# Patient Record
Sex: FEMALE | Race: WHITE | NOT HISPANIC OR LATINO
[De-identification: names, ages, dates, MRNs, and addresses within clinical notes are randomized per-mention and may not be internally consistent; named-entity substitution may affect disease eponyms.]

---

## 2017-02-07 ENCOUNTER — APPOINTMENT (OUTPATIENT)
Dept: INTERNAL MEDICINE | Facility: CLINIC | Age: 62
End: 2017-02-07

## 2017-04-08 ENCOUNTER — RX RENEWAL (OUTPATIENT)
Age: 62
End: 2017-04-08

## 2017-05-03 ENCOUNTER — OUTPATIENT (OUTPATIENT)
Dept: OUTPATIENT SERVICES | Facility: HOSPITAL | Age: 62
LOS: 1 days | Discharge: HOME | End: 2017-05-03
Payer: COMMERCIAL

## 2017-05-03 ENCOUNTER — TRANSCRIPTION ENCOUNTER (OUTPATIENT)
Age: 62
End: 2017-05-03

## 2017-05-03 DIAGNOSIS — M46.1 SACROILIITIS, NOT ELSEWHERE CLASSIFIED: ICD-10-CM

## 2017-05-03 PROCEDURE — 76000 FLUOROSCOPY <1 HR PHYS/QHP: CPT

## 2017-05-03 PROCEDURE — G0260: CPT | Mod: LT

## 2017-05-15 ENCOUNTER — APPOINTMENT (OUTPATIENT)
Dept: RHEUMATOLOGY | Facility: CLINIC | Age: 62
End: 2017-05-15

## 2017-05-15 VITALS — HEART RATE: 76 BPM | SYSTOLIC BLOOD PRESSURE: 120 MMHG | RESPIRATION RATE: 16 BRPM | DIASTOLIC BLOOD PRESSURE: 70 MMHG

## 2017-05-16 ENCOUNTER — APPOINTMENT (OUTPATIENT)
Dept: RHEUMATOLOGY | Facility: CLINIC | Age: 62
End: 2017-05-16

## 2017-05-16 ENCOUNTER — LABORATORY RESULT (OUTPATIENT)
Age: 62
End: 2017-05-16

## 2017-05-16 ENCOUNTER — RESULT CHARGE (OUTPATIENT)
Age: 62
End: 2017-05-16

## 2017-05-17 LAB
BASOPHILS # BLD AUTO: 0.06 K/UL
BASOPHILS NFR BLD AUTO: 0.6 %
EOSINOPHIL # BLD AUTO: 0.48 K/UL
EOSINOPHIL NFR BLD AUTO: 4.8 %
HCT VFR BLD CALC: 43.1 %
HGB BLD-MCNC: 14 G/DL
IMM GRANULOCYTES NFR BLD AUTO: 0.5 %
LYMPHOCYTES # BLD AUTO: 2.46 K/UL
LYMPHOCYTES NFR BLD AUTO: 24.7 %
MAN DIFF?: NORMAL
MCHC RBC-ENTMCNC: 28.7 PG
MCHC RBC-ENTMCNC: 32.5 GM/DL
MCV RBC AUTO: 88.3 FL
MONOCYTES # BLD AUTO: 0.65 K/UL
MONOCYTES NFR BLD AUTO: 6.5 %
NEUTROPHILS # BLD AUTO: 6.25 K/UL
NEUTROPHILS NFR BLD AUTO: 62.9 %
PLATELET # BLD AUTO: 333 K/UL
RBC # BLD: 4.88 M/UL
RBC # FLD: 15.1 %
WBC # FLD AUTO: 9.95 K/UL

## 2017-05-21 LAB
ALBUMIN SERPL ELPH-MCNC: 4.1 G/DL
ALP BLD-CCNC: 87 U/L
ALT SERPL-CCNC: 27 U/L
ANION GAP SERPL CALC-SCNC: 17 MMOL/L
AST SERPL-CCNC: 22 U/L
BILIRUB SERPL-MCNC: 0.3 MG/DL
BUN SERPL-MCNC: 19 MG/DL
CALCIUM SERPL-MCNC: 9.7 MG/DL
CHLORIDE SERPL-SCNC: 101 MMOL/L
CK SERPL-CCNC: 71 U/L
CO2 SERPL-SCNC: 23 MMOL/L
CREAT SERPL-MCNC: 0.76 MG/DL
CRP SERPL-MCNC: 0.4 MG/DL
DEPRECATED KAPPA LC FREE/LAMBDA SER: 0.92 RATIO
ENA SS-A AB SER IA-ACNC: <0.2 AL
ENA SS-B AB SER IA-ACNC: <0.2 AL
GLUCOSE SERPL-MCNC: 106 MG/DL
IGA SER QL IEP: 172 MG/DL
IGG SER QL IEP: 1680 MG/DL
IGM SER QL IEP: 61 MG/DL
KAPPA LC CSF-MCNC: 2.13 MG/DL
KAPPA LC SERPL-MCNC: 1.95 MG/DL
M PROTEIN SPEC IFE-MCNC: NORMAL
PHOSPHATE SERPL-MCNC: 3.3 MG/DL
POTASSIUM SERPL-SCNC: 4 MMOL/L
PROT SERPL-MCNC: 7.7 G/DL
SODIUM SERPL-SCNC: 141 MMOL/L
T3 SERPL-MCNC: 119 NG/DL
T3RU NFR SERPL: 1.03 INDEX
T4 SERPL-MCNC: 6.8 UG/DL
TSH SERPL-ACNC: 0.72 UIU/ML

## 2017-05-25 ENCOUNTER — TRANSCRIPTION ENCOUNTER (OUTPATIENT)
Age: 62
End: 2017-05-25

## 2017-06-16 ENCOUNTER — OUTPATIENT (OUTPATIENT)
Dept: OUTPATIENT SERVICES | Facility: HOSPITAL | Age: 62
LOS: 1 days | End: 2017-06-16
Payer: COMMERCIAL

## 2017-06-16 ENCOUNTER — TRANSCRIPTION ENCOUNTER (OUTPATIENT)
Age: 62
End: 2017-06-16

## 2017-06-16 DIAGNOSIS — M54.16 RADICULOPATHY, LUMBAR REGION: ICD-10-CM

## 2017-06-16 PROCEDURE — G0260: CPT | Mod: LT

## 2017-06-16 PROCEDURE — 77002 NEEDLE LOCALIZATION BY XRAY: CPT

## 2017-09-05 ENCOUNTER — APPOINTMENT (OUTPATIENT)
Dept: RHEUMATOLOGY | Facility: CLINIC | Age: 62
End: 2017-09-05
Payer: COMMERCIAL

## 2017-09-05 VITALS
HEIGHT: 66 IN | RESPIRATION RATE: 18 BRPM | OXYGEN SATURATION: 97 % | BODY MASS INDEX: 28.93 KG/M2 | DIASTOLIC BLOOD PRESSURE: 80 MMHG | SYSTOLIC BLOOD PRESSURE: 130 MMHG | WEIGHT: 180 LBS | HEART RATE: 79 BPM | TEMPERATURE: 97.6 F

## 2017-09-05 DIAGNOSIS — M20.009 UNSPECIFIED DEFORMITY OF UNSPECIFIED FINGER(S): ICD-10-CM

## 2017-09-05 PROCEDURE — 36415 COLL VENOUS BLD VENIPUNCTURE: CPT

## 2017-09-05 PROCEDURE — 99215 OFFICE O/P EST HI 40 MIN: CPT | Mod: 25

## 2017-09-05 PROCEDURE — 81003 URINALYSIS AUTO W/O SCOPE: CPT | Mod: QW

## 2017-09-05 PROCEDURE — 85651 RBC SED RATE NONAUTOMATED: CPT

## 2017-09-06 LAB
BILIRUB UR QL STRIP: NORMAL
CLARITY UR: CLEAR
COLLECTION METHOD: NORMAL
GLUCOSE UR-MCNC: NORMAL
HCG UR QL: 0.2 EU/DL
HGB UR QL STRIP.AUTO: NORMAL
KETONES UR-MCNC: NORMAL
LEUKOCYTE ESTERASE UR QL STRIP: NORMAL
NITRITE UR QL STRIP: POSITIVE
PH UR STRIP: 6
PROT UR STRIP-MCNC: NORMAL
SP GR UR STRIP: 1.01
WESR: 21

## 2017-09-09 LAB
ALBUMIN SERPL ELPH-MCNC: 4.3 G/DL
ALP BLD-CCNC: 100 U/L
ALT SERPL-CCNC: 23 U/L
ANION GAP SERPL CALC-SCNC: 17 MMOL/L
AST SERPL-CCNC: 20 U/L
BASOPHILS # BLD AUTO: 0.04 K/UL
BASOPHILS NFR BLD AUTO: 0.3 %
BILIRUB SERPL-MCNC: 0.2 MG/DL
BUN SERPL-MCNC: 15 MG/DL
CALCIUM SERPL-MCNC: 9.4 MG/DL
CHLORIDE SERPL-SCNC: 101 MMOL/L
CK SERPL-CCNC: 83 U/L
CO2 SERPL-SCNC: 23 MMOL/L
CREAT SERPL-MCNC: 0.72 MG/DL
CRP SERPL-MCNC: 0.5 MG/DL
EOSINOPHIL # BLD AUTO: 0.4 K/UL
EOSINOPHIL NFR BLD AUTO: 3.3 %
GLUCOSE SERPL-MCNC: 96 MG/DL
HCT VFR BLD CALC: 40.9 %
HGB BLD-MCNC: 13.3 G/DL
IMM GRANULOCYTES NFR BLD AUTO: 0.5 %
LYMPHOCYTES # BLD AUTO: 2.95 K/UL
LYMPHOCYTES NFR BLD AUTO: 24.5 %
MAN DIFF?: NORMAL
MCHC RBC-ENTMCNC: 28.1 PG
MCHC RBC-ENTMCNC: 32.5 GM/DL
MCV RBC AUTO: 86.5 FL
MONOCYTES # BLD AUTO: 1.15 K/UL
MONOCYTES NFR BLD AUTO: 9.6 %
NEUTROPHILS # BLD AUTO: 7.43 K/UL
NEUTROPHILS NFR BLD AUTO: 61.8 %
PHOSPHATE SERPL-MCNC: 3.1 MG/DL
PLATELET # BLD AUTO: 371 K/UL
POTASSIUM SERPL-SCNC: 4 MMOL/L
PROT SERPL-MCNC: 7.4 G/DL
RBC # BLD: 4.73 M/UL
RBC # FLD: 14.3 %
SODIUM SERPL-SCNC: 141 MMOL/L
WBC # FLD AUTO: 12.03 K/UL

## 2017-11-06 ENCOUNTER — TRANSCRIPTION ENCOUNTER (OUTPATIENT)
Age: 62
End: 2017-11-06

## 2017-12-01 ENCOUNTER — RX RENEWAL (OUTPATIENT)
Age: 62
End: 2017-12-01

## 2017-12-01 ENCOUNTER — MEDICATION RENEWAL (OUTPATIENT)
Age: 62
End: 2017-12-01

## 2017-12-02 ENCOUNTER — RX RENEWAL (OUTPATIENT)
Age: 62
End: 2017-12-02

## 2017-12-12 ENCOUNTER — TRANSCRIPTION ENCOUNTER (OUTPATIENT)
Age: 62
End: 2017-12-12

## 2018-01-08 ENCOUNTER — APPOINTMENT (OUTPATIENT)
Dept: RHEUMATOLOGY | Facility: CLINIC | Age: 63
End: 2018-01-08
Payer: COMMERCIAL

## 2018-01-08 VITALS
HEART RATE: 85 BPM | RESPIRATION RATE: 18 BRPM | TEMPERATURE: 97.9 F | OXYGEN SATURATION: 97 % | SYSTOLIC BLOOD PRESSURE: 132 MMHG | BODY MASS INDEX: 29.41 KG/M2 | HEIGHT: 66 IN | WEIGHT: 183 LBS | DIASTOLIC BLOOD PRESSURE: 72 MMHG

## 2018-01-08 LAB
BILIRUB UR QL STRIP: NORMAL
CLARITY UR: CLEAR
COLLECTION METHOD: NORMAL
GLUCOSE UR-MCNC: NORMAL
HCG UR QL: 0.2 EU/DL
HGB UR QL STRIP.AUTO: NORMAL
KETONES UR-MCNC: NORMAL
LEUKOCYTE ESTERASE UR QL STRIP: NORMAL
NITRITE UR QL STRIP: POSITIVE
PH UR STRIP: 6
PROT UR STRIP-MCNC: NORMAL
SP GR UR STRIP: 1.02
WESR: 17

## 2018-01-08 PROCEDURE — 99214 OFFICE O/P EST MOD 30 MIN: CPT | Mod: 25

## 2018-01-08 PROCEDURE — 36415 COLL VENOUS BLD VENIPUNCTURE: CPT

## 2018-01-08 PROCEDURE — 85651 RBC SED RATE NONAUTOMATED: CPT

## 2018-01-10 LAB
25(OH)D3 SERPL-MCNC: 48.9 NG/ML
ALBUMIN SERPL ELPH-MCNC: 4.6 G/DL
ALP BLD-CCNC: 111 U/L
ALT SERPL-CCNC: 28 U/L
ANION GAP SERPL CALC-SCNC: 17 MMOL/L
APPEARANCE: CLEAR
AST SERPL-CCNC: 21 U/L
BACTERIA: ABNORMAL
BASOPHILS # BLD AUTO: 0.05 K/UL
BASOPHILS NFR BLD AUTO: 0.4 %
BILIRUB SERPL-MCNC: 0.2 MG/DL
BILIRUBIN URINE: NEGATIVE
BLOOD URINE: ABNORMAL
BUN SERPL-MCNC: 26 MG/DL
CALCIUM SERPL-MCNC: 10.1 MG/DL
CHLORIDE SERPL-SCNC: 101 MMOL/L
CO2 SERPL-SCNC: 23 MMOL/L
COLOR: YELLOW
CREAT SERPL-MCNC: 0.79 MG/DL
CRP SERPL-MCNC: 0.5 MG/DL
ENA SS-A AB SER IA-ACNC: <0.2 AL
ENA SS-B AB SER IA-ACNC: <0.2 AL
EOSINOPHIL # BLD AUTO: 0.41 K/UL
EOSINOPHIL NFR BLD AUTO: 3.6 %
GLUCOSE QUALITATIVE U: NEGATIVE MG/DL
GLUCOSE SERPL-MCNC: 125 MG/DL
HCT VFR BLD CALC: 41.1 %
HGB BLD-MCNC: 13.5 G/DL
HYALINE CASTS: 1 /LPF
IMM GRANULOCYTES NFR BLD AUTO: 0.4 %
KETONES URINE: NEGATIVE
LEUKOCYTE ESTERASE URINE: ABNORMAL
LYMPHOCYTES # BLD AUTO: 2.61 K/UL
LYMPHOCYTES NFR BLD AUTO: 22.7 %
MAN DIFF?: NORMAL
MCHC RBC-ENTMCNC: 28.2 PG
MCHC RBC-ENTMCNC: 32.8 GM/DL
MCV RBC AUTO: 85.8 FL
MICROSCOPIC-UA: NORMAL
MONOCYTES # BLD AUTO: 0.94 K/UL
MONOCYTES NFR BLD AUTO: 8.2 %
NEUTROPHILS # BLD AUTO: 7.45 K/UL
NEUTROPHILS NFR BLD AUTO: 64.7 %
NITRITE URINE: POSITIVE
PH URINE: 6
PHOSPHATE SERPL-MCNC: 3.7 MG/DL
PLATELET # BLD AUTO: 321 K/UL
POTASSIUM SERPL-SCNC: 4 MMOL/L
PROT SERPL-MCNC: 7.4 G/DL
PROTEIN URINE: NEGATIVE MG/DL
RBC # BLD: 4.79 M/UL
RBC # FLD: 14.4 %
RED BLOOD CELLS URINE: 2 /HPF
SODIUM SERPL-SCNC: 141 MMOL/L
SPECIFIC GRAVITY URINE: 1.02
SQUAMOUS EPITHELIAL CELLS: 4 /HPF
UROBILINOGEN URINE: NEGATIVE MG/DL
WBC # FLD AUTO: 11.51 K/UL
WHITE BLOOD CELLS URINE: 3 /HPF

## 2018-01-11 ENCOUNTER — APPOINTMENT (OUTPATIENT)
Dept: INTERNAL MEDICINE | Facility: CLINIC | Age: 63
End: 2018-01-11
Payer: COMMERCIAL

## 2018-01-11 PROCEDURE — 99213 OFFICE O/P EST LOW 20 MIN: CPT | Mod: 25

## 2018-01-11 PROCEDURE — 90686 IIV4 VACC NO PRSV 0.5 ML IM: CPT

## 2018-01-11 PROCEDURE — 36415 COLL VENOUS BLD VENIPUNCTURE: CPT

## 2018-01-11 PROCEDURE — G0008: CPT

## 2018-03-28 ENCOUNTER — TRANSCRIPTION ENCOUNTER (OUTPATIENT)
Age: 63
End: 2018-03-28

## 2018-04-06 ENCOUNTER — TRANSCRIPTION ENCOUNTER (OUTPATIENT)
Age: 63
End: 2018-04-06

## 2018-04-10 ENCOUNTER — MEDICATION RENEWAL (OUTPATIENT)
Age: 63
End: 2018-04-10

## 2018-07-10 ENCOUNTER — APPOINTMENT (OUTPATIENT)
Dept: RHEUMATOLOGY | Facility: CLINIC | Age: 63
End: 2018-07-10
Payer: COMMERCIAL

## 2018-07-10 ENCOUNTER — LABORATORY RESULT (OUTPATIENT)
Age: 63
End: 2018-07-10

## 2018-07-10 ENCOUNTER — APPOINTMENT (OUTPATIENT)
Dept: RHEUMATOLOGY | Facility: CLINIC | Age: 63
End: 2018-07-10

## 2018-07-10 VITALS
TEMPERATURE: 98.7 F | WEIGHT: 183 LBS | SYSTOLIC BLOOD PRESSURE: 130 MMHG | DIASTOLIC BLOOD PRESSURE: 82 MMHG | HEART RATE: 92 BPM | OXYGEN SATURATION: 98 % | BODY MASS INDEX: 29.54 KG/M2 | RESPIRATION RATE: 18 BRPM

## 2018-07-10 PROCEDURE — 81003 URINALYSIS AUTO W/O SCOPE: CPT | Mod: QW

## 2018-07-10 PROCEDURE — 99214 OFFICE O/P EST MOD 30 MIN: CPT | Mod: 25

## 2018-07-10 PROCEDURE — 36415 COLL VENOUS BLD VENIPUNCTURE: CPT

## 2018-07-10 PROCEDURE — 85651 RBC SED RATE NONAUTOMATED: CPT

## 2018-07-10 RX ORDER — ALBUTEROL SULFATE 90 UG/1
108 (90 BASE) AEROSOL, METERED RESPIRATORY (INHALATION)
Qty: 18 | Refills: 0 | Status: DISCONTINUED | COMMUNITY
Start: 2018-03-28

## 2018-07-10 RX ORDER — AZITHROMYCIN 250 MG/1
250 TABLET, FILM COATED ORAL
Qty: 6 | Refills: 0 | Status: DISCONTINUED | COMMUNITY
Start: 2018-03-28

## 2018-07-10 RX ORDER — LEVOFLOXACIN 750 MG/1
750 TABLET, FILM COATED ORAL
Qty: 5 | Refills: 0 | Status: DISCONTINUED | COMMUNITY
Start: 2018-04-09

## 2018-07-10 RX ORDER — PREDNISONE 50 MG/1
50 TABLET ORAL
Qty: 5 | Refills: 0 | Status: DISCONTINUED | COMMUNITY
Start: 2018-03-28

## 2018-07-10 RX ORDER — BENZONATATE 100 MG/1
100 CAPSULE ORAL
Qty: 21 | Refills: 0 | Status: DISCONTINUED | COMMUNITY
Start: 2018-03-28

## 2018-07-11 LAB
ALBUMIN SERPL ELPH-MCNC: 4.7 G/DL
ALP BLD-CCNC: 121 U/L
ALT SERPL-CCNC: 27 U/L
ANION GAP SERPL CALC-SCNC: 14 MMOL/L
AST SERPL-CCNC: 20 U/L
BASOPHILS # BLD AUTO: 0.07 K/UL
BASOPHILS NFR BLD AUTO: 0.6 %
BILIRUB SERPL-MCNC: 0.2 MG/DL
BILIRUB UR QL STRIP: NORMAL
BUN SERPL-MCNC: 13 MG/DL
CALCIUM SERPL-MCNC: 9.8 MG/DL
CHLORIDE SERPL-SCNC: 100 MMOL/L
CK SERPL-CCNC: 71 U/L
CLARITY UR: CLEAR
CO2 SERPL-SCNC: 26 MMOL/L
COLLECTION METHOD: NORMAL
CREAT SERPL-MCNC: 0.62 MG/DL
CRP SERPL-MCNC: 0.66 MG/DL
DEPRECATED KAPPA LC FREE/LAMBDA SER: 0.62 RATIO
ENA SS-A AB SER IA-ACNC: <0.2 AL
ENA SS-B AB SER IA-ACNC: <0.2 AL
EOSINOPHIL # BLD AUTO: 0.37 K/UL
EOSINOPHIL NFR BLD AUTO: 3.4 %
GLUCOSE SERPL-MCNC: 107 MG/DL
GLUCOSE UR-MCNC: NORMAL
HCG UR QL: 0.2 EU/DL
HCT VFR BLD CALC: 42.5 %
HGB BLD-MCNC: 14.2 G/DL
HGB UR QL STRIP.AUTO: NORMAL
IGA SER QL IEP: 166 MG/DL
IGG SER QL IEP: 1415 MG/DL
IGM SER QL IEP: 88 MG/DL
IMM GRANULOCYTES NFR BLD AUTO: 0.6 %
KAPPA LC CSF-MCNC: 1.75 MG/DL
KAPPA LC SERPL-MCNC: 1.08 MG/DL
KETONES UR-MCNC: NORMAL
LDH SERPL-CCNC: 189 U/L
LEUKOCYTE ESTERASE UR QL STRIP: NORMAL
LYMPHOCYTES # BLD AUTO: 2.78 K/UL
LYMPHOCYTES NFR BLD AUTO: 25.7 %
M PROTEIN SPEC IFE-MCNC: NORMAL
MAN DIFF?: NORMAL
MCHC RBC-ENTMCNC: 28.4 PG
MCHC RBC-ENTMCNC: 33.4 GM/DL
MCV RBC AUTO: 85 FL
MONOCYTES # BLD AUTO: 0.79 K/UL
MONOCYTES NFR BLD AUTO: 7.3 %
NEUTROPHILS # BLD AUTO: 6.72 K/UL
NEUTROPHILS NFR BLD AUTO: 62.4 %
NITRITE UR QL STRIP: NORMAL
PH UR STRIP: 6
PHOSPHATE SERPL-MCNC: 3.3 MG/DL
PLATELET # BLD AUTO: 342 K/UL
POTASSIUM SERPL-SCNC: 3.9 MMOL/L
PROT SERPL-MCNC: 7.6 G/DL
PROT UR STRIP-MCNC: NORMAL
RBC # BLD: 5 M/UL
RBC # FLD: 15.2 %
RHEUMATOID FACT SER QL: <10 IU/ML
SODIUM SERPL-SCNC: 140 MMOL/L
SP GR UR STRIP: 1.01
WBC # FLD AUTO: 10.8 K/UL
WESR: 21

## 2018-07-15 LAB
CCP AB SER IA-ACNC: <8 UNITS
RF+CCP IGG SER-IMP: NEGATIVE
T3 SERPL-MCNC: 146 NG/DL
T3RU NFR SERPL: 1.08 INDEX
T4 SERPL-MCNC: 7.7 UG/DL
TSH SERPL-ACNC: 1.14 UIU/ML

## 2018-07-28 LAB
CA VI IGA AB: 5.7 EU/ML
CA VI IGG AB: 38.3 EU/ML
CA VI IGM AB: 6.8 EU/ML
PSP IGA AB: 5.6 EU/ML
PSP IGG AB: 6.5 EU/ML
PSP IGM AB: 11.8 EU/ML
SEROLOGY COMMENTS: NORMAL
SP-1 IGA AB: 3.9 EU/ML
SP-1 IGG AB: 4.5 EU/ML
SP-1 IGM AB: 21.9 EU/ML

## 2018-10-23 ENCOUNTER — RX RENEWAL (OUTPATIENT)
Age: 63
End: 2018-10-23

## 2018-11-02 ENCOUNTER — APPOINTMENT (OUTPATIENT)
Dept: RHEUMATOLOGY | Facility: CLINIC | Age: 63
End: 2018-11-02
Payer: COMMERCIAL

## 2018-11-02 VITALS
HEIGHT: 66 IN | WEIGHT: 185 LBS | OXYGEN SATURATION: 97 % | DIASTOLIC BLOOD PRESSURE: 76 MMHG | HEART RATE: 89 BPM | TEMPERATURE: 98.6 F | SYSTOLIC BLOOD PRESSURE: 144 MMHG | RESPIRATION RATE: 19 BRPM | BODY MASS INDEX: 29.73 KG/M2

## 2018-11-02 DIAGNOSIS — R68.2 DRY MOUTH, UNSPECIFIED: ICD-10-CM

## 2018-11-02 PROCEDURE — 99215 OFFICE O/P EST HI 40 MIN: CPT | Mod: 25

## 2018-11-02 PROCEDURE — 81003 URINALYSIS AUTO W/O SCOPE: CPT | Mod: QW

## 2018-11-02 PROCEDURE — 36415 COLL VENOUS BLD VENIPUNCTURE: CPT

## 2018-11-02 RX ORDER — LEVOFLOXACIN 500 MG/1
500 TABLET, FILM COATED ORAL
Qty: 7 | Refills: 0 | Status: DISCONTINUED | COMMUNITY
Start: 2018-10-08

## 2018-11-02 RX ORDER — PREDNISONE 10 MG/1
10 TABLET ORAL
Qty: 15 | Refills: 0 | Status: DISCONTINUED | COMMUNITY
Start: 2018-10-22

## 2018-11-02 RX ORDER — PREDNISONE 20 MG/1
20 TABLET ORAL
Qty: 5 | Refills: 0 | Status: DISCONTINUED | COMMUNITY
Start: 2018-10-08

## 2018-11-02 RX ORDER — BROMPHENIRAMINE MALEATE, PSEUDOEPHEDRINE HYDROCHLORIDE, 2; 30; 10 MG/5ML; MG/5ML; MG/5ML
30-2-10 SYRUP ORAL
Qty: 180 | Refills: 0 | Status: DISCONTINUED | COMMUNITY
Start: 2018-10-08

## 2018-11-03 LAB
25(OH)D3 SERPL-MCNC: 55.2 NG/ML
ALBUMIN SERPL ELPH-MCNC: 4.2 G/DL
ALP BLD-CCNC: 111 U/L
ALT SERPL-CCNC: 27 U/L
ANION GAP SERPL CALC-SCNC: 14 MMOL/L
AST SERPL-CCNC: 20 U/L
BASOPHILS # BLD AUTO: 0.05 K/UL
BASOPHILS NFR BLD AUTO: 0.4 %
BILIRUB SERPL-MCNC: 0.2 MG/DL
BILIRUB UR QL STRIP: NORMAL
BUN SERPL-MCNC: 10 MG/DL
CALCIUM SERPL-MCNC: 9.5 MG/DL
CHLORIDE SERPL-SCNC: 102 MMOL/L
CLARITY UR: CLEAR
CO2 SERPL-SCNC: 23 MMOL/L
COLLECTION METHOD: NORMAL
CREAT SERPL-MCNC: 0.71 MG/DL
CRP SERPL-MCNC: 0.95 MG/DL
ENA SS-A AB SER IA-ACNC: <0.2 AL
ENA SS-B AB SER IA-ACNC: <0.2 AL
EOSINOPHIL # BLD AUTO: 0.39 K/UL
EOSINOPHIL NFR BLD AUTO: 3.2 %
ERYTHROCYTE [SEDIMENTATION RATE] IN BLOOD BY WESTERGREN METHOD: 24 MM/HR
GLUCOSE SERPL-MCNC: 113 MG/DL
GLUCOSE UR-MCNC: NORMAL
HCG UR QL: 0.2 EU/DL
HCT VFR BLD CALC: 42.4 %
HGB BLD-MCNC: 13.7 G/DL
HGB UR QL STRIP.AUTO: NORMAL
IMM GRANULOCYTES NFR BLD AUTO: 0.7 %
KETONES UR-MCNC: NORMAL
LEUKOCYTE ESTERASE UR QL STRIP: NORMAL
LYMPHOCYTES # BLD AUTO: 2.98 K/UL
LYMPHOCYTES NFR BLD AUTO: 24.7 %
MAN DIFF?: NORMAL
MCHC RBC-ENTMCNC: 28 PG
MCHC RBC-ENTMCNC: 32.3 GM/DL
MCV RBC AUTO: 86.7 FL
MONOCYTES # BLD AUTO: 1.02 K/UL
MONOCYTES NFR BLD AUTO: 8.5 %
NEUTROPHILS # BLD AUTO: 7.52 K/UL
NEUTROPHILS NFR BLD AUTO: 62.5 %
NITRITE UR QL STRIP: NORMAL
PH UR STRIP: 6.5
PHOSPHATE SERPL-MCNC: 2.9 MG/DL
PLATELET # BLD AUTO: 375 K/UL
POTASSIUM SERPL-SCNC: 4 MMOL/L
PROT SERPL-MCNC: 7 G/DL
PROT UR STRIP-MCNC: NORMAL
RBC # BLD: 4.89 M/UL
RBC # FLD: 14.9 %
SODIUM SERPL-SCNC: 139 MMOL/L
SP GR UR STRIP: 1.01
WBC # FLD AUTO: 12.05 K/UL

## 2018-11-15 ENCOUNTER — APPOINTMENT (OUTPATIENT)
Dept: DERMATOLOGY | Facility: CLINIC | Age: 63
End: 2018-11-15
Payer: COMMERCIAL

## 2018-11-15 PROCEDURE — 99203 OFFICE O/P NEW LOW 30 MIN: CPT

## 2018-11-28 ENCOUNTER — EMERGENCY (EMERGENCY)
Facility: HOSPITAL | Age: 63
LOS: 1 days | Discharge: HOME | End: 2018-11-28
Attending: EMERGENCY MEDICINE
Payer: COMMERCIAL

## 2018-11-28 VITALS — WEIGHT: 182.98 LBS | HEIGHT: 67 IN

## 2018-11-28 VITALS
TEMPERATURE: 98 F | HEART RATE: 110 BPM | OXYGEN SATURATION: 98 % | RESPIRATION RATE: 19 BRPM | DIASTOLIC BLOOD PRESSURE: 84 MMHG | SYSTOLIC BLOOD PRESSURE: 150 MMHG

## 2018-11-28 LAB
ALBUMIN SERPL ELPH-MCNC: 4 G/DL — SIGNIFICANT CHANGE UP (ref 3.3–5.2)
ALP SERPL-CCNC: 90 U/L — SIGNIFICANT CHANGE UP (ref 40–120)
ALT FLD-CCNC: 34 U/L — HIGH
ANION GAP SERPL CALC-SCNC: 12 MMOL/L — SIGNIFICANT CHANGE UP (ref 5–17)
AST SERPL-CCNC: 35 U/L — HIGH
BASOPHILS # BLD AUTO: 0 K/UL — SIGNIFICANT CHANGE UP (ref 0–0.2)
BASOPHILS NFR BLD AUTO: 0.5 % — SIGNIFICANT CHANGE UP (ref 0–2)
BILIRUB SERPL-MCNC: 0.4 MG/DL — SIGNIFICANT CHANGE UP (ref 0.4–2)
BUN SERPL-MCNC: 14 MG/DL — SIGNIFICANT CHANGE UP (ref 8–20)
CALCIUM SERPL-MCNC: 9.5 MG/DL — SIGNIFICANT CHANGE UP (ref 8.6–10.2)
CHLORIDE SERPL-SCNC: 100 MMOL/L — SIGNIFICANT CHANGE UP (ref 98–107)
CO2 SERPL-SCNC: 26 MMOL/L — SIGNIFICANT CHANGE UP (ref 22–29)
CREAT SERPL-MCNC: 0.65 MG/DL — SIGNIFICANT CHANGE UP (ref 0.5–1.3)
EOSINOPHIL # BLD AUTO: 0.6 K/UL — HIGH (ref 0–0.5)
EOSINOPHIL NFR BLD AUTO: 5.5 % — SIGNIFICANT CHANGE UP (ref 0–6)
GLUCOSE SERPL-MCNC: 136 MG/DL — HIGH (ref 70–115)
HCT VFR BLD CALC: 44.1 % — SIGNIFICANT CHANGE UP (ref 37–47)
HGB BLD-MCNC: 14.6 G/DL — SIGNIFICANT CHANGE UP (ref 12–16)
LYMPHOCYTES # BLD AUTO: 1 K/UL — SIGNIFICANT CHANGE UP (ref 1–4.8)
LYMPHOCYTES # BLD AUTO: 9.8 % — LOW (ref 20–55)
MCHC RBC-ENTMCNC: 28.2 PG — SIGNIFICANT CHANGE UP (ref 27–31)
MCHC RBC-ENTMCNC: 33.1 G/DL — SIGNIFICANT CHANGE UP (ref 32–36)
MCV RBC AUTO: 85.3 FL — SIGNIFICANT CHANGE UP (ref 81–99)
MONOCYTES # BLD AUTO: 0.9 K/UL — HIGH (ref 0–0.8)
MONOCYTES NFR BLD AUTO: 8.3 % — SIGNIFICANT CHANGE UP (ref 3–10)
NEUTROPHILS # BLD AUTO: 7.9 K/UL — SIGNIFICANT CHANGE UP (ref 1.8–8)
NEUTROPHILS NFR BLD AUTO: 74.9 % — HIGH (ref 37–73)
PLATELET # BLD AUTO: 284 K/UL — SIGNIFICANT CHANGE UP (ref 150–400)
POTASSIUM SERPL-MCNC: 3.7 MMOL/L — SIGNIFICANT CHANGE UP (ref 3.5–5.3)
POTASSIUM SERPL-SCNC: 3.7 MMOL/L — SIGNIFICANT CHANGE UP (ref 3.5–5.3)
PROT SERPL-MCNC: 7.4 G/DL — SIGNIFICANT CHANGE UP (ref 6.6–8.7)
RBC # BLD: 5.17 M/UL — SIGNIFICANT CHANGE UP (ref 4.4–5.2)
RBC # FLD: 14.8 % — SIGNIFICANT CHANGE UP (ref 11–15.6)
SODIUM SERPL-SCNC: 138 MMOL/L — SIGNIFICANT CHANGE UP (ref 135–145)
WBC # BLD: 10.5 K/UL — SIGNIFICANT CHANGE UP (ref 4.8–10.8)
WBC # FLD AUTO: 10.5 K/UL — SIGNIFICANT CHANGE UP (ref 4.8–10.8)

## 2018-11-28 PROCEDURE — 71046 X-RAY EXAM CHEST 2 VIEWS: CPT | Mod: 26

## 2018-11-28 PROCEDURE — 36415 COLL VENOUS BLD VENIPUNCTURE: CPT

## 2018-11-28 PROCEDURE — 80053 COMPREHEN METABOLIC PANEL: CPT

## 2018-11-28 PROCEDURE — 85027 COMPLETE CBC AUTOMATED: CPT

## 2018-11-28 PROCEDURE — 99283 EMERGENCY DEPT VISIT LOW MDM: CPT | Mod: 25

## 2018-11-28 PROCEDURE — 71046 X-RAY EXAM CHEST 2 VIEWS: CPT

## 2018-11-28 PROCEDURE — 94640 AIRWAY INHALATION TREATMENT: CPT

## 2018-11-28 PROCEDURE — 99284 EMERGENCY DEPT VISIT MOD MDM: CPT

## 2018-11-28 RX ORDER — IPRATROPIUM/ALBUTEROL SULFATE 18-103MCG
3 AEROSOL WITH ADAPTER (GRAM) INHALATION ONCE
Qty: 0 | Refills: 0 | Status: COMPLETED | OUTPATIENT
Start: 2018-11-28 | End: 2018-11-28

## 2018-11-28 RX ORDER — SODIUM CHLORIDE 9 MG/ML
2000 INJECTION INTRAMUSCULAR; INTRAVENOUS; SUBCUTANEOUS ONCE
Qty: 0 | Refills: 0 | Status: COMPLETED | OUTPATIENT
Start: 2018-11-28 | End: 2018-11-28

## 2018-11-28 RX ORDER — IBUPROFEN 200 MG
400 TABLET ORAL ONCE
Qty: 0 | Refills: 0 | Status: COMPLETED | OUTPATIENT
Start: 2018-11-28 | End: 2018-11-28

## 2018-11-28 RX ADMIN — Medication 400 MILLIGRAM(S): at 13:01

## 2018-11-28 RX ADMIN — SODIUM CHLORIDE 1000 MILLILITER(S): 9 INJECTION INTRAMUSCULAR; INTRAVENOUS; SUBCUTANEOUS at 13:02

## 2018-11-28 RX ADMIN — Medication 3 MILLILITER(S): at 13:01

## 2018-11-28 NOTE — ED STATDOCS - OBJECTIVE STATEMENT
Telemedicine assessment was conducted (using real time 2 way audio-video technology) by Dr. Maritza Snowden located at [address] [place] [address]  ++++++++++++++++++++++++  Pertinent patient history and initial plan:   64 y/o F pt with hx of HTN, GERD, reactive airway presents to ED c/o body ache, cough and HA x3 days. Pt was given an inhaler for dx of reactive airway secondary to persistent cough/ bronchitis, her cough resolved 2 weeks ago but came back about 2-3 days ago. She states she felt warm but denies measuring her temp at home. Pt took cough medication over last few days with mild improvement. Denies N/V/D, SOB, CP. Telemedicine assessment was conducted (using real time 2 way audio-video technology) by Dr. Maritza Snowden located at [address] [place] [id number]  ++++++++++++++++++++++++  Pertinent patient history and initial plan:   62 y/o F pt with hx of HTN, GERD, reactive airway presents to ED c/o body aches, dry cough and HA x3 days. Pt was given an inhaler for dx of reactive airway secondary to persistent cough/ bronchitis, her cough resolved 2 weeks ago but came back about 2-3 days ago. She states she felt warm but denies measuring her temp at home. Pt took cough medication over last few days with mild improvement (bromipheniramine). Denies N/V/D, SOB, CP.    On virtual exam pt is well appearing, nad    Plan - likely viral illness, tachycardia noted, will get cxr, give ivf, duonebs, reassess

## 2018-11-28 NOTE — ED ADULT NURSE NOTE - OBJECTIVE STATEMENT
pt stated she has 8/10 neck and upper back pain, body aches and coughing possibly from her bronchitis that she had over the summer

## 2018-11-28 NOTE — ED PROVIDER NOTE - OBJECTIVE STATEMENT
62 y/o F c/o body aches and cough x 3 days.  Patient states that she had subjective fever on the first day that she experienced symptoms but that the fever has now resolved.  Denies vomiting, diarrhea, dysuria or any other complaints.  Patient states that she is on her last day of macrobid prescribed by her PMD for a UTI.  Patient denies dysuria, urinary frequency, urgency, hematuria, flank pain or any other complaints.  Patient states that her symptoms have been gradually improving.

## 2018-11-28 NOTE — ED PROVIDER NOTE - ATTENDING CONTRIBUTION TO CARE
I personally saw the patient with the PA, and completed the key components of the history and physical exam. I then discussed the management plan with the PA.    64 y/o F  w. HTN , GERD, RAD, c/o body aches and cough x 3 days associated with mild headache.  PT reports no fever x 2 days; pt cough started 2 weeks ago, improved and then got worse x 2-3 days;  Pt also  on her last day of macrobid prescribed by her PMD for a UTI and notes resolution of urinary symptoms.  Patient states that her symptoms have been gradually improving. Pt has been able to eat/drink, denies significant SOB.  Pt notes that she has been taking a medication containing bromipheniramine and PSEUDOEPHEDRINE for the last few days, which she did not like much.  Pt received duoneb prior to my evaluation. She denies palpitations, chest pain, and attributes her tachycardia to the OTC medication she has been taking.       GEN - NAD; well appearing; A+O x3   HEAD - NC/AT     ENT - PEERL, EOMI, mucous membranes  moist , no discharge      NECK: Neck supple, non-tender without lymphadenopathy, no masses, no JVD  PULM - CTA b/l,  symmetric breath sounds  COR -  normal heart sounds/ no M/R/G.   ABD - , ND, NT, soft, no guarding, no rebound, no masses    BACK - no CVA tenderness, nontender spine     EXTREMS - no edema, no deformity, warm and well perfused    SKIN - no rash or bruising      NEUROLOGIC - alert, no gross deficits appreciated.    IMP: well appearing female c/o viral-like symptoms;  recurrent cough; lungs CTA on exam and pt in no acute distress. Discussed tachycardia w/ pt which she feels is 2/2 nebs and pseudoephedrine; advised to continue good hydration, use albuterol PRN and return for any worsening symptoms.

## 2018-11-28 NOTE — ED ADULT NURSE NOTE - NSIMPLEMENTINTERV_GEN_ALL_ED
Implemented All Universal Safety Interventions:  [place] to call system. Call bell, personal items and telephone within reach. Instruct patient to call for assistance. Room bathroom lighting operational. Non-slip footwear when patient is off stretcher. Physically safe environment: no spills, clutter or unnecessary equipment. Stretcher in lowest position, wheels locked, appropriate side rails in place.

## 2018-11-28 NOTE — ED ADULT TRIAGE NOTE - CHIEF COMPLAINT QUOTE
Patient A/OX3, recently being treated for bladder infection-on Macrobid, states she feels like I have the Flu-body aches, headache, and cough.

## 2018-12-13 PROBLEM — K21.9 GASTRO-ESOPHAGEAL REFLUX DISEASE WITHOUT ESOPHAGITIS: Chronic | Status: ACTIVE | Noted: 2018-11-28

## 2018-12-13 PROBLEM — I10 ESSENTIAL (PRIMARY) HYPERTENSION: Chronic | Status: ACTIVE | Noted: 2018-11-28

## 2019-02-18 ENCOUNTER — MOBILE ON CALL (OUTPATIENT)
Age: 64
End: 2019-02-18

## 2019-03-04 ENCOUNTER — APPOINTMENT (OUTPATIENT)
Dept: RHEUMATOLOGY | Facility: CLINIC | Age: 64
End: 2019-03-04
Payer: COMMERCIAL

## 2019-03-04 ENCOUNTER — TRANSCRIPTION ENCOUNTER (OUTPATIENT)
Age: 64
End: 2019-03-04

## 2019-03-04 VITALS
OXYGEN SATURATION: 97 % | WEIGHT: 180 LBS | HEART RATE: 80 BPM | SYSTOLIC BLOOD PRESSURE: 122 MMHG | DIASTOLIC BLOOD PRESSURE: 70 MMHG | BODY MASS INDEX: 29.05 KG/M2 | RESPIRATION RATE: 18 BRPM | TEMPERATURE: 98.1 F

## 2019-03-04 PROCEDURE — 99215 OFFICE O/P EST HI 40 MIN: CPT

## 2019-03-04 RX ORDER — NEBIVOLOL HYDROCHLORIDE 5 MG/1
5 TABLET ORAL
Refills: 0 | Status: ACTIVE | COMMUNITY

## 2019-03-04 RX ORDER — POTASSIUM CHLORIDE 750 MG/1
10 TABLET, FILM COATED, EXTENDED RELEASE ORAL
Qty: 30 | Refills: 0 | Status: DISCONTINUED | COMMUNITY
Start: 2019-03-01

## 2019-03-04 RX ORDER — CHLORHEXIDINE GLUCONATE 4 %
250 LIQUID (ML) TOPICAL
Qty: 90 | Refills: 0 | Status: DISCONTINUED | COMMUNITY
Start: 2019-03-01

## 2019-03-21 ENCOUNTER — CLINICAL ADVICE (OUTPATIENT)
Age: 64
End: 2019-03-21

## 2019-04-22 ENCOUNTER — RX RENEWAL (OUTPATIENT)
Age: 64
End: 2019-04-22

## 2019-04-26 ENCOUNTER — APPOINTMENT (OUTPATIENT)
Dept: INTERNAL MEDICINE | Facility: CLINIC | Age: 64
End: 2019-04-26
Payer: COMMERCIAL

## 2019-04-26 ENCOUNTER — NON-APPOINTMENT (OUTPATIENT)
Age: 64
End: 2019-04-26

## 2019-04-26 ENCOUNTER — LABORATORY RESULT (OUTPATIENT)
Age: 64
End: 2019-04-26

## 2019-04-26 VITALS
TEMPERATURE: 98.3 F | WEIGHT: 173 LBS | HEIGHT: 66 IN | BODY MASS INDEX: 27.8 KG/M2 | OXYGEN SATURATION: 98 % | SYSTOLIC BLOOD PRESSURE: 120 MMHG | HEART RATE: 69 BPM | DIASTOLIC BLOOD PRESSURE: 70 MMHG

## 2019-04-26 PROCEDURE — 94010 BREATHING CAPACITY TEST: CPT

## 2019-04-26 PROCEDURE — 99204 OFFICE O/P NEW MOD 45 MIN: CPT | Mod: 25

## 2019-04-26 PROCEDURE — 92552 PURE TONE AUDIOMETRY AIR: CPT

## 2019-04-26 PROCEDURE — 82043 UR ALBUMIN QUANTITATIVE: CPT | Mod: QW

## 2019-04-26 PROCEDURE — 36415 COLL VENOUS BLD VENIPUNCTURE: CPT

## 2019-04-26 PROCEDURE — 93000 ELECTROCARDIOGRAM COMPLETE: CPT

## 2019-04-26 RX ORDER — BUDESONIDE AND FORMOTEROL FUMARATE DIHYDRATE 80; 4.5 UG/1; UG/1
80-4.5 AEROSOL RESPIRATORY (INHALATION)
Qty: 10 | Refills: 0 | Status: DISCONTINUED | COMMUNITY
Start: 2018-10-31 | End: 2019-04-26

## 2019-04-26 RX ORDER — METOPROLOL SUCCINATE 50 MG/1
50 TABLET, EXTENDED RELEASE ORAL
Qty: 90 | Refills: 1 | Status: DISCONTINUED | COMMUNITY
Start: 2018-10-23 | End: 2019-04-26

## 2019-04-27 LAB
25(OH)D3 SERPL-MCNC: 70.6 NG/ML
ALBUMIN SERPL ELPH-MCNC: 4.5 G/DL
ALBUMIN: 80
ALP BLD-CCNC: 92 U/L
ALT SERPL-CCNC: 23 U/L
ANION GAP SERPL CALC-SCNC: 13 MMOL/L
APPEARANCE: ABNORMAL
AST SERPL-CCNC: 22 U/L
BASOPHILS # BLD AUTO: 0.08 K/UL
BASOPHILS NFR BLD AUTO: 0.9 %
BILIRUB SERPL-MCNC: 0.5 MG/DL
BILIRUBIN URINE: ABNORMAL
BLOOD URINE: NEGATIVE
BUN SERPL-MCNC: 17 MG/DL
CALCIUM SERPL-MCNC: 9.8 MG/DL
CHLORIDE SERPL-SCNC: 104 MMOL/L
CHOLEST SERPL-MCNC: 198 MG/DL
CHOLEST/HDLC SERPL: 3.9 RATIO
CK SERPL-CCNC: 69 U/L
CO2 SERPL-SCNC: 25 MMOL/L
COLOR: YELLOW
CREAT SERPL-MCNC: 0.73 MG/DL
CREATININE: 300
EOSINOPHIL # BLD AUTO: 0.29 K/UL
EOSINOPHIL NFR BLD AUTO: 3.2 %
ESTIMATED AVERAGE GLUCOSE: 140 MG/DL
GLUCOSE QUALITATIVE U: NEGATIVE
GLUCOSE SERPL-MCNC: 116 MG/DL
HBA1C MFR BLD HPLC: 6.5 %
HCT VFR BLD CALC: 45.7 %
HDLC SERPL-MCNC: 51 MG/DL
HGB BLD-MCNC: 14.2 G/DL
IMM GRANULOCYTES NFR BLD AUTO: 0.6 %
KETONES URINE: NEGATIVE
LDLC SERPL CALC-MCNC: 133 MG/DL
LEUKOCYTE ESTERASE URINE: ABNORMAL
LYMPHOCYTES # BLD AUTO: 2.07 K/UL
LYMPHOCYTES NFR BLD AUTO: 22.8 %
MAN DIFF?: NORMAL
MCHC RBC-ENTMCNC: 27.3 PG
MCHC RBC-ENTMCNC: 31.1 GM/DL
MCV RBC AUTO: 87.9 FL
MICROALBUMIN/CREAT UR TEST STR-RTO: NORMAL
MONOCYTES # BLD AUTO: 0.7 K/UL
MONOCYTES NFR BLD AUTO: 7.7 %
NEUTROPHILS # BLD AUTO: 5.88 K/UL
NEUTROPHILS NFR BLD AUTO: 64.8 %
NITRITE URINE: NEGATIVE
PH URINE: 6
PLATELET # BLD AUTO: 377 K/UL
POTASSIUM SERPL-SCNC: 4.5 MMOL/L
PROT SERPL-MCNC: 7.6 G/DL
PROTEIN URINE: ABNORMAL
RBC # BLD: 5.2 M/UL
RBC # FLD: 14.6 %
SODIUM SERPL-SCNC: 142 MMOL/L
SPECIFIC GRAVITY URINE: 1.03
TRIGL SERPL-MCNC: 72 MG/DL
TSH SERPL-ACNC: 1.24 UIU/ML
URATE SERPL-MCNC: 6.4 MG/DL
UROBILINOGEN URINE: ABNORMAL
WBC # FLD AUTO: 9.07 K/UL

## 2019-05-04 LAB — HEMOCCULT STL QL IA: NEGATIVE

## 2019-06-03 ENCOUNTER — APPOINTMENT (OUTPATIENT)
Dept: RHEUMATOLOGY | Facility: CLINIC | Age: 64
End: 2019-06-03
Payer: COMMERCIAL

## 2019-06-03 VITALS
HEART RATE: 75 BPM | TEMPERATURE: 98.1 F | RESPIRATION RATE: 17 BRPM | SYSTOLIC BLOOD PRESSURE: 134 MMHG | DIASTOLIC BLOOD PRESSURE: 70 MMHG | OXYGEN SATURATION: 97 % | BODY MASS INDEX: 27.32 KG/M2 | WEIGHT: 170 LBS | HEIGHT: 66 IN

## 2019-06-03 PROCEDURE — 99214 OFFICE O/P EST MOD 30 MIN: CPT

## 2019-06-03 RX ORDER — PNV NO.95/FERROUS FUM/FOLIC AC 28MG-0.8MG
TABLET ORAL
Refills: 0 | Status: ACTIVE | COMMUNITY

## 2019-06-03 RX ORDER — BIOTIN 10 MG
TABLET ORAL
Refills: 0 | Status: ACTIVE | COMMUNITY

## 2019-06-28 ENCOUNTER — APPOINTMENT (OUTPATIENT)
Dept: INTERNAL MEDICINE | Facility: CLINIC | Age: 64
End: 2019-06-28

## 2019-07-22 ENCOUNTER — MEDICATION RENEWAL (OUTPATIENT)
Age: 64
End: 2019-07-22

## 2019-08-07 RX ORDER — ESOMEPRAZOLE MAGNESIUM 40 MG/1
40 CAPSULE, DELAYED RELEASE ORAL DAILY
Qty: 30 | Refills: 0 | Status: ACTIVE | COMMUNITY
Start: 2017-02-15 | End: 1900-01-01

## 2019-09-07 ENCOUNTER — MEDICATION RENEWAL (OUTPATIENT)
Age: 64
End: 2019-09-07

## 2019-09-12 ENCOUNTER — APPOINTMENT (OUTPATIENT)
Dept: RHEUMATOLOGY | Facility: CLINIC | Age: 64
End: 2019-09-12

## 2019-09-26 ENCOUNTER — APPOINTMENT (OUTPATIENT)
Dept: RHEUMATOLOGY | Facility: CLINIC | Age: 64
End: 2019-09-26
Payer: COMMERCIAL

## 2019-09-26 VITALS
OXYGEN SATURATION: 96 % | DIASTOLIC BLOOD PRESSURE: 74 MMHG | TEMPERATURE: 97.5 F | SYSTOLIC BLOOD PRESSURE: 122 MMHG | RESPIRATION RATE: 18 BRPM | HEART RATE: 54 BPM

## 2019-09-26 PROCEDURE — 99215 OFFICE O/P EST HI 40 MIN: CPT

## 2019-09-26 RX ORDER — TRIAMTERENE AND HYDROCHLOROTHIAZIDE 37.5; 25 MG/1; MG/1
37.5-25 CAPSULE ORAL DAILY
Qty: 30 | Refills: 0 | Status: DISCONTINUED | COMMUNITY
Start: 2018-10-23 | End: 2019-09-26

## 2019-12-30 ENCOUNTER — RX RENEWAL (OUTPATIENT)
Age: 64
End: 2019-12-30

## 2020-01-21 ENCOUNTER — APPOINTMENT (OUTPATIENT)
Dept: RHEUMATOLOGY | Facility: CLINIC | Age: 65
End: 2020-01-21
Payer: COMMERCIAL

## 2020-01-21 VITALS
RESPIRATION RATE: 18 BRPM | BODY MASS INDEX: 25.55 KG/M2 | SYSTOLIC BLOOD PRESSURE: 130 MMHG | OXYGEN SATURATION: 98 % | HEART RATE: 69 BPM | HEIGHT: 66 IN | DIASTOLIC BLOOD PRESSURE: 68 MMHG | TEMPERATURE: 97.2 F | WEIGHT: 159 LBS

## 2020-01-21 PROCEDURE — 81003 URINALYSIS AUTO W/O SCOPE: CPT | Mod: QW

## 2020-01-21 PROCEDURE — 99214 OFFICE O/P EST MOD 30 MIN: CPT | Mod: 25

## 2020-01-21 PROCEDURE — 36415 COLL VENOUS BLD VENIPUNCTURE: CPT

## 2020-01-26 LAB
25(OH)D3 SERPL-MCNC: 53.9 NG/ML
ALBUMIN SERPL ELPH-MCNC: 4.9 G/DL
ALP BLD-CCNC: 94 U/L
ALT SERPL-CCNC: 17 U/L
ANION GAP SERPL CALC-SCNC: 16 MMOL/L
AST SERPL-CCNC: 19 U/L
BASOPHILS # BLD AUTO: 0.08 K/UL
BASOPHILS NFR BLD AUTO: 0.9 %
BILIRUB SERPL-MCNC: 0.2 MG/DL
BILIRUB UR QL STRIP: NORMAL
BUN SERPL-MCNC: 13 MG/DL
CALCIUM SERPL-MCNC: 10.4 MG/DL
CHLORIDE SERPL-SCNC: 104 MMOL/L
CLARITY UR: CLEAR
CO2 SERPL-SCNC: 21 MMOL/L
COLLECTION METHOD: NORMAL
CREAT SERPL-MCNC: 0.66 MG/DL
CRP SERPL-MCNC: 0.37 MG/DL
DEPRECATED KAPPA LC FREE/LAMBDA SER: 1.08 RATIO
ENA SS-A AB SER IA-ACNC: 0.4 AL
ENA SS-B AB SER IA-ACNC: <0.2 AL
EOSINOPHIL # BLD AUTO: 0.3 K/UL
EOSINOPHIL NFR BLD AUTO: 3.4 %
ERYTHROCYTE [SEDIMENTATION RATE] IN BLOOD BY WESTERGREN METHOD: 42 MM/HR
GLUCOSE SERPL-MCNC: 101 MG/DL
GLUCOSE UR-MCNC: NORMAL
HCG UR QL: 0.2 EU/DL
HCT VFR BLD CALC: 43.6 %
HGB BLD-MCNC: 13.8 G/DL
HGB UR QL STRIP.AUTO: NORMAL
IGA SER QL IEP: 164 MG/DL
IGG SER QL IEP: 1362 MG/DL
IGM SER QL IEP: 76 MG/DL
IMM GRANULOCYTES NFR BLD AUTO: 0.3 %
KAPPA LC CSF-MCNC: 1.49 MG/DL
KAPPA LC SERPL-MCNC: 1.61 MG/DL
KETONES UR-MCNC: NORMAL
LDH SERPL-CCNC: 177 U/L
LEUKOCYTE ESTERASE UR QL STRIP: NORMAL
LYMPHOCYTES # BLD AUTO: 2.49 K/UL
LYMPHOCYTES NFR BLD AUTO: 28 %
M PROTEIN SPEC IFE-MCNC: NORMAL
MAN DIFF?: NORMAL
MCHC RBC-ENTMCNC: 28.2 PG
MCHC RBC-ENTMCNC: 31.7 GM/DL
MCV RBC AUTO: 89.2 FL
MONOCYTES # BLD AUTO: 0.7 K/UL
MONOCYTES NFR BLD AUTO: 7.9 %
NEUTROPHILS # BLD AUTO: 5.3 K/UL
NEUTROPHILS NFR BLD AUTO: 59.5 %
NITRITE UR QL STRIP: NORMAL
PH UR STRIP: 6.5
PHOSPHATE SERPL-MCNC: 3.2 MG/DL
PLATELET # BLD AUTO: 346 K/UL
POTASSIUM SERPL-SCNC: 4.3 MMOL/L
PROT SERPL-MCNC: 7.7 G/DL
PROT UR STRIP-MCNC: NORMAL
RBC # BLD: 4.89 M/UL
RBC # FLD: 14.5 %
SODIUM SERPL-SCNC: 142 MMOL/L
SP GR UR STRIP: 1.01
WBC # FLD AUTO: 8.9 K/UL

## 2020-07-16 ENCOUNTER — APPOINTMENT (OUTPATIENT)
Dept: RHEUMATOLOGY | Facility: CLINIC | Age: 65
End: 2020-07-16
Payer: COMMERCIAL

## 2020-07-16 DIAGNOSIS — M79.645 PAIN IN LEFT FINGER(S): ICD-10-CM

## 2020-07-16 PROCEDURE — 99215 OFFICE O/P EST HI 40 MIN: CPT | Mod: 95

## 2020-07-20 PROBLEM — M79.645 THUMB PAIN, LEFT: Status: ACTIVE | Noted: 2020-07-20

## 2020-11-02 ENCOUNTER — APPOINTMENT (OUTPATIENT)
Dept: RHEUMATOLOGY | Facility: CLINIC | Age: 65
End: 2020-11-02
Payer: COMMERCIAL

## 2020-11-02 DIAGNOSIS — M19.90 UNSPECIFIED OSTEOARTHRITIS, UNSPECIFIED SITE: ICD-10-CM

## 2020-11-02 DIAGNOSIS — M85.80 OTHER SPECIFIED DISORDERS OF BONE DENSITY AND STRUCTURE, UNSPECIFIED SITE: ICD-10-CM

## 2020-11-02 PROCEDURE — 99214 OFFICE O/P EST MOD 30 MIN: CPT | Mod: 95

## 2021-01-28 ENCOUNTER — APPOINTMENT (OUTPATIENT)
Dept: RHEUMATOLOGY | Facility: CLINIC | Age: 66
End: 2021-01-28
Payer: COMMERCIAL

## 2021-01-28 PROCEDURE — 99215 OFFICE O/P EST HI 40 MIN: CPT | Mod: 95

## 2021-02-01 RX ORDER — BENZONATATE 200 MG/1
200 CAPSULE ORAL
Qty: 20 | Refills: 0 | Status: DISCONTINUED | COMMUNITY
Start: 2020-10-01

## 2021-02-01 RX ORDER — AMOXICILLIN 875 MG/1
875 TABLET, FILM COATED ORAL
Qty: 20 | Refills: 0 | Status: DISCONTINUED | COMMUNITY
Start: 2020-10-05

## 2021-02-22 ENCOUNTER — LABORATORY RESULT (OUTPATIENT)
Age: 66
End: 2021-02-22

## 2021-02-22 ENCOUNTER — APPOINTMENT (OUTPATIENT)
Dept: ENDOCRINOLOGY | Facility: CLINIC | Age: 66
End: 2021-02-22
Payer: COMMERCIAL

## 2021-02-22 VITALS
WEIGHT: 161 LBS | HEIGHT: 66 IN | SYSTOLIC BLOOD PRESSURE: 110 MMHG | BODY MASS INDEX: 25.88 KG/M2 | TEMPERATURE: 95.3 F | DIASTOLIC BLOOD PRESSURE: 70 MMHG

## 2021-02-22 DIAGNOSIS — Z87.39 PERSONAL HISTORY OF OTHER DISEASES OF THE MUSCULOSKELETAL SYSTEM AND CONNECTIVE TISSUE: ICD-10-CM

## 2021-02-22 DIAGNOSIS — Z80.9 FAMILY HISTORY OF MALIGNANT NEOPLASM, UNSPECIFIED: ICD-10-CM

## 2021-02-22 DIAGNOSIS — Z83.49 FAMILY HISTORY OF OTHER ENDOCRINE, NUTRITIONAL AND METABOLIC DISEASES: ICD-10-CM

## 2021-02-22 DIAGNOSIS — Z87.19 PERSONAL HISTORY OF OTHER DISEASES OF THE DIGESTIVE SYSTEM: ICD-10-CM

## 2021-02-22 DIAGNOSIS — Z83.3 FAMILY HISTORY OF DIABETES MELLITUS: ICD-10-CM

## 2021-02-22 DIAGNOSIS — Z86.79 PERSONAL HISTORY OF OTHER DISEASES OF THE CIRCULATORY SYSTEM: ICD-10-CM

## 2021-02-22 DIAGNOSIS — Z82.49 FAMILY HISTORY OF ISCHEMIC HEART DISEASE AND OTHER DISEASES OF THE CIRCULATORY SYSTEM: ICD-10-CM

## 2021-02-22 PROCEDURE — 99072 ADDL SUPL MATRL&STAF TM PHE: CPT

## 2021-02-22 PROCEDURE — 99214 OFFICE O/P EST MOD 30 MIN: CPT

## 2021-02-22 NOTE — PHYSICAL EXAM
[Alert] : alert [Well Nourished] : well nourished [No Acute Distress] : no acute distress [Well Developed] : well developed [Normal Sclera/Conjunctiva] : normal sclera/conjunctiva [EOMI] : extra ocular movement intact [No Proptosis] : no proptosis [Normal Oropharynx] : the oropharynx was normal [Thyroid Not Enlarged] : the thyroid was not enlarged [No Respiratory Distress] : no respiratory distress [No Accessory Muscle Use] : no accessory muscle use [Clear to Auscultation] : lungs were clear to auscultation bilaterally [Normal S1, S2] : normal S1 and S2 [Normal Rate] : heart rate was normal [Regular Rhythm] : with a regular rhythm [No Edema] : no peripheral edema [Pedal Pulses Normal] : the pedal pulses are present [Normal Bowel Sounds] : normal bowel sounds [Not Tender] : non-tender [Not Distended] : not distended [Soft] : abdomen soft [Normal Anterior Cervical Nodes] : no anterior cervical lymphadenopathy [Normal Posterior Cervical Nodes] : no posterior cervical lymphadenopathy [No Stigmata of Cushings Syndrome] : no stigmata of Cushings Syndrome [Normal Gait] : normal gait [No Rash] : no rash [No Tremors] : no tremors [Oriented x3] : oriented to person, place, and time [Acanthosis Nigricans] : no acanthosis nigricans [de-identified] : R thyroid enlarged

## 2021-02-22 NOTE — REASON FOR VISIT
[Initial Evaluation] : an initial evaluation [Thyroid nodule/ MNG] : thyroid nodule/ MNG [FreeTextEntry2] : Shiv ETIENNE

## 2021-02-22 NOTE — HISTORY OF PRESENT ILLNESS
[FreeTextEntry1] : quality: NTMNG  \par onset 2016\par severity: moderate \par modifying factors: none  \par associated factors: HTN \par

## 2021-02-22 NOTE — ASSESSMENT
[FreeTextEntry1] : NTMNG \par check TFts \par extensive h/o thyroid disease \par no dysphagia, no dysphonia, no neck pain, no voice change\par no family h/o thyroid cancer, no neck XRT\par check thyroid US \par she has FNA done repeatedly last one 2017\par reviewed old  records with previous US \par will call pt with results. \par \par Overweight:\par discussed diet and exercise\par encouraged more exercise walking 30 min 3 x week\par Needs to try to have more protein for meals\par \par HTN ;  bp at target on meds. Continue current management.\par I advised low fat/low cholesterol diet, low salt diet, and weight loss\par \par osteopenia : \par continue calcium \par continue vitamin d \par weight bearing exercises advsied \par declines treatment anyway \par \par \par \par \par

## 2021-02-24 ENCOUNTER — APPOINTMENT (OUTPATIENT)
Dept: RADIOLOGY | Facility: CLINIC | Age: 66
End: 2021-02-24
Payer: COMMERCIAL

## 2021-02-24 ENCOUNTER — OUTPATIENT (OUTPATIENT)
Dept: OUTPATIENT SERVICES | Facility: HOSPITAL | Age: 66
LOS: 1 days | End: 2021-02-24
Payer: MEDICARE

## 2021-02-24 ENCOUNTER — RESULT REVIEW (OUTPATIENT)
Age: 66
End: 2021-02-24

## 2021-02-24 ENCOUNTER — APPOINTMENT (OUTPATIENT)
Dept: ULTRASOUND IMAGING | Facility: CLINIC | Age: 66
End: 2021-02-24
Payer: COMMERCIAL

## 2021-02-24 DIAGNOSIS — Z00.8 ENCOUNTER FOR OTHER GENERAL EXAMINATION: ICD-10-CM

## 2021-02-24 PROCEDURE — 71046 X-RAY EXAM CHEST 2 VIEWS: CPT | Mod: 26

## 2021-02-24 PROCEDURE — 73562 X-RAY EXAM OF KNEE 3: CPT | Mod: 26,LT

## 2021-02-24 PROCEDURE — 72070 X-RAY EXAM THORAC SPINE 2VWS: CPT | Mod: 26

## 2021-02-24 PROCEDURE — 73562 X-RAY EXAM OF KNEE 3: CPT

## 2021-02-24 PROCEDURE — 76536 US EXAM OF HEAD AND NECK: CPT | Mod: 26

## 2021-02-24 PROCEDURE — 76536 US EXAM OF HEAD AND NECK: CPT

## 2021-02-24 PROCEDURE — 71046 X-RAY EXAM CHEST 2 VIEWS: CPT

## 2021-02-24 PROCEDURE — 72070 X-RAY EXAM THORAC SPINE 2VWS: CPT

## 2021-03-02 LAB
ALBUMIN SERPL ELPH-MCNC: 4.5 G/DL
ALP BLD-CCNC: 91 U/L
ALT SERPL-CCNC: 21 U/L
ANION GAP SERPL CALC-SCNC: 11 MMOL/L
APPEARANCE: CLEAR
AST SERPL-CCNC: 21 U/L
BACTERIA: NEGATIVE
BASOPHILS # BLD AUTO: 0.07 K/UL
BASOPHILS NFR BLD AUTO: 0.7 %
BILIRUB SERPL-MCNC: 0.5 MG/DL
BILIRUBIN URINE: NEGATIVE
BLOOD URINE: NEGATIVE
BUN SERPL-MCNC: 17 MG/DL
CALCIUM SERPL-MCNC: 9.8 MG/DL
CHLORIDE SERPL-SCNC: 101 MMOL/L
CK SERPL-CCNC: 73 U/L
CO2 SERPL-SCNC: 26 MMOL/L
COLOR: NORMAL
CREAT SERPL-MCNC: 0.65 MG/DL
CRP SERPL-MCNC: 0.21 MG/DL
DEPRECATED KAPPA LC FREE/LAMBDA SER: 1.06 RATIO
ENA SS-A AB SER IA-ACNC: 0.2 AL
ENA SS-B AB SER IA-ACNC: <0.2 AL
EOSINOPHIL # BLD AUTO: 0.34 K/UL
EOSINOPHIL NFR BLD AUTO: 3.5 %
ERYTHROCYTE [SEDIMENTATION RATE] IN BLOOD BY WESTERGREN METHOD: 15 MM/HR
GLUCOSE QUALITATIVE U: NEGATIVE
GLUCOSE SERPL-MCNC: 108 MG/DL
HCT VFR BLD CALC: 44.4 %
HGB BLD-MCNC: 14.3 G/DL
HYALINE CASTS: 3 /LPF
IGA SER QL IEP: 162 MG/DL
IGG SER QL IEP: 1253 MG/DL
IGM SER QL IEP: 76 MG/DL
IMM GRANULOCYTES NFR BLD AUTO: 0.5 %
KAPPA LC CSF-MCNC: 1.44 MG/DL
KAPPA LC SERPL-MCNC: 1.52 MG/DL
KETONES URINE: NEGATIVE
LDH SERPL-CCNC: 167 U/L
LEUKOCYTE ESTERASE URINE: NEGATIVE
LYMPHOCYTES # BLD AUTO: 2.26 K/UL
LYMPHOCYTES NFR BLD AUTO: 23.6 %
M PROTEIN SPEC IFE-MCNC: NORMAL
MAN DIFF?: NORMAL
MCHC RBC-ENTMCNC: 29.5 PG
MCHC RBC-ENTMCNC: 32.2 GM/DL
MCV RBC AUTO: 91.7 FL
MICROSCOPIC-UA: NORMAL
MONOCYTES # BLD AUTO: 0.8 K/UL
MONOCYTES NFR BLD AUTO: 8.4 %
NEUTROPHILS # BLD AUTO: 6.06 K/UL
NEUTROPHILS NFR BLD AUTO: 63.3 %
NITRITE URINE: NEGATIVE
PH URINE: 6.5
PHOSPHATE SERPL-MCNC: 3.4 MG/DL
PLATELET # BLD AUTO: 350 K/UL
POTASSIUM SERPL-SCNC: 4.2 MMOL/L
PROT SERPL-MCNC: 7.2 G/DL
PROTEIN URINE: NEGATIVE
RBC # BLD: 4.84 M/UL
RBC # FLD: 13.5 %
RED BLOOD CELLS URINE: 1 /HPF
SODIUM SERPL-SCNC: 138 MMOL/L
SPECIFIC GRAVITY URINE: 1.01
SQUAMOUS EPITHELIAL CELLS: 0 /HPF
TSH SERPL-ACNC: 0.92 UIU/ML
UROBILINOGEN URINE: NORMAL
WBC # FLD AUTO: 9.58 K/UL
WHITE BLOOD CELLS URINE: 0 /HPF

## 2021-03-03 ENCOUNTER — NON-APPOINTMENT (OUTPATIENT)
Age: 66
End: 2021-03-03

## 2021-03-04 ENCOUNTER — NON-APPOINTMENT (OUTPATIENT)
Age: 66
End: 2021-03-04

## 2021-03-05 ENCOUNTER — LABORATORY RESULT (OUTPATIENT)
Age: 66
End: 2021-03-05

## 2021-03-05 ENCOUNTER — APPOINTMENT (OUTPATIENT)
Dept: ENDOCRINOLOGY | Facility: CLINIC | Age: 66
End: 2021-03-05
Payer: COMMERCIAL

## 2021-03-05 PROCEDURE — 10005 FNA BX W/US GDN 1ST LES: CPT

## 2021-03-05 PROCEDURE — 99072 ADDL SUPL MATRL&STAF TM PHE: CPT

## 2021-03-05 PROCEDURE — 10006 FNA BX W/US GDN EA ADDL: CPT | Mod: 59

## 2021-03-05 NOTE — PROCEDURE
[Fine Needle Aspiration] : Fine needle aspiration ~T ~C was performed. [Area of Mass: ______] : mass identified in the [unfilled] [Risks] : risks [Benefits] : benefits [Alternatives] : alternatives [Consent Obtained] : Written consent was obtained prior to the procedure and is detailed in the patient's record [Patient] : the patient [Ethyl Chloride] : ethyl chloride [Supine] : The patient was placed in the supine position with the neck extended as tolerated. [Alcohol] : with alcohol [27 gauge 1.5 inch] : A 27 gauge 1.5 inch needle was used [2 Passes] : 2 passes were made through the mass [Ultrasonic Guidance] : ultrasound guidance was employed [Sent to Histology] : The specimens were prepared in the usual manner and sent to histology. [Thyroseq] : Thyroseq [Tolerated Well] : the patient tolerated the procedure well [Vital Signs Stable] : the vital signs were stable [Hemostasis] : hemostasis was assured and the patient was discharged in satisfactory condition [No Complications] : There were no complications

## 2021-05-13 ENCOUNTER — APPOINTMENT (OUTPATIENT)
Dept: RHEUMATOLOGY | Facility: CLINIC | Age: 66
End: 2021-05-13
Payer: COMMERCIAL

## 2021-05-13 VITALS
OXYGEN SATURATION: 98 % | RESPIRATION RATE: 17 BRPM | SYSTOLIC BLOOD PRESSURE: 116 MMHG | HEIGHT: 66 IN | TEMPERATURE: 98.3 F | HEART RATE: 80 BPM | DIASTOLIC BLOOD PRESSURE: 70 MMHG

## 2021-05-13 PROCEDURE — 81003 URINALYSIS AUTO W/O SCOPE: CPT | Mod: QW

## 2021-05-13 PROCEDURE — 36415 COLL VENOUS BLD VENIPUNCTURE: CPT

## 2021-05-13 PROCEDURE — 99215 OFFICE O/P EST HI 40 MIN: CPT | Mod: 25

## 2021-05-13 PROCEDURE — G2212 PROLONG OUTPT/OFFICE VIS: CPT

## 2021-05-13 PROCEDURE — 99072 ADDL SUPL MATRL&STAF TM PHE: CPT

## 2021-05-16 LAB
ALBUMIN SERPL ELPH-MCNC: 4.6 G/DL
ALP BLD-CCNC: 86 U/L
ALT SERPL-CCNC: 17 U/L
ANION GAP SERPL CALC-SCNC: 11 MMOL/L
AST SERPL-CCNC: 20 U/L
BASOPHILS # BLD AUTO: 0.07 K/UL
BASOPHILS NFR BLD AUTO: 0.8 %
BILIRUB SERPL-MCNC: 0.2 MG/DL
BILIRUB UR QL STRIP: NEGATIVE
BUN SERPL-MCNC: 16 MG/DL
CALCIUM SERPL-MCNC: 9.9 MG/DL
CALCIUM SERPL-MCNC: 9.9 MG/DL
CHLORIDE SERPL-SCNC: 105 MMOL/L
CK SERPL-CCNC: 71 U/L
CO2 SERPL-SCNC: 24 MMOL/L
CREAT SERPL-MCNC: 0.67 MG/DL
CRP SERPL-MCNC: <3 MG/L
ENA SS-A AB SER IA-ACNC: <0.2 AL
ENA SS-B AB SER IA-ACNC: <0.2 AL
EOSINOPHIL # BLD AUTO: 0.35 K/UL
EOSINOPHIL NFR BLD AUTO: 3.9 %
ERYTHROCYTE [SEDIMENTATION RATE] IN BLOOD BY WESTERGREN METHOD: 33 MM/HR
GLIADIN IGA SER QL: 5.3 UNITS
GLIADIN IGG SER QL: <5 UNITS
GLIADIN PEPTIDE IGA SER-ACNC: NEGATIVE
GLIADIN PEPTIDE IGG SER-ACNC: NEGATIVE
GLUCOSE SERPL-MCNC: 101 MG/DL
GLUCOSE UR-MCNC: NEGATIVE
HCG UR QL: 0.2 EU/DL
HCT VFR BLD CALC: 42.6 %
HGB BLD-MCNC: 13.8 G/DL
HGB UR QL STRIP.AUTO: NEGATIVE
IMM GRANULOCYTES NFR BLD AUTO: 0.6 %
KETONES UR-MCNC: NEGATIVE
LDH SERPL-CCNC: 179 U/L
LEUKOCYTE ESTERASE UR QL STRIP: NORMAL
LYMPHOCYTES # BLD AUTO: 2.1 K/UL
LYMPHOCYTES NFR BLD AUTO: 23.3 %
MAN DIFF?: NORMAL
MCHC RBC-ENTMCNC: 29.2 PG
MCHC RBC-ENTMCNC: 32.4 GM/DL
MCV RBC AUTO: 90.3 FL
MONOCYTES # BLD AUTO: 0.65 K/UL
MONOCYTES NFR BLD AUTO: 7.2 %
NEUTROPHILS # BLD AUTO: 5.8 K/UL
NEUTROPHILS NFR BLD AUTO: 64.2 %
NITRITE UR QL STRIP: NEGATIVE
PARATHYROID HORMONE INTACT: 31 PG/ML
PH UR STRIP: 6.5
PHOSPHATE SERPL-MCNC: 3.6 MG/DL
PLATELET # BLD AUTO: 347 K/UL
POTASSIUM SERPL-SCNC: 4.3 MMOL/L
PROT SERPL-MCNC: 7.5 G/DL
PROT UR STRIP-MCNC: NEGATIVE
RBC # BLD: 4.72 M/UL
RBC # FLD: 13.4 %
SODIUM SERPL-SCNC: 140 MMOL/L
SP GR UR STRIP: 1.01
TTG IGA SER IA-ACNC: <1.2 U/ML
TTG IGA SER-ACNC: NEGATIVE
TTG IGG SER IA-ACNC: 5 U/ML
TTG IGG SER IA-ACNC: NEGATIVE
WBC # FLD AUTO: 9.02 K/UL

## 2021-05-24 LAB
ENDOMYSIUM IGA SER QL: NEGATIVE
ENDOMYSIUM IGA TITR SER: NORMAL

## 2021-06-18 ENCOUNTER — APPOINTMENT (OUTPATIENT)
Dept: DERMATOLOGY | Facility: CLINIC | Age: 66
End: 2021-06-18
Payer: COMMERCIAL

## 2021-06-18 PROCEDURE — 99214 OFFICE O/P EST MOD 30 MIN: CPT

## 2021-06-18 PROCEDURE — 99072 ADDL SUPL MATRL&STAF TM PHE: CPT

## 2021-08-02 ENCOUNTER — RESULT REVIEW (OUTPATIENT)
Age: 66
End: 2021-08-02

## 2021-08-02 ENCOUNTER — APPOINTMENT (OUTPATIENT)
Dept: DERMATOLOGY | Facility: CLINIC | Age: 66
End: 2021-08-02
Payer: COMMERCIAL

## 2021-08-02 DIAGNOSIS — D17.1 BENIGN LIPOMATOUS NEOPLASM OF SKIN AND SUBCUTANEOUS TISSUE OF TRUNK: ICD-10-CM

## 2021-08-02 PROCEDURE — 13101 CMPLX RPR TRUNK 2.6-7.5 CM: CPT

## 2021-08-02 PROCEDURE — 11406 EXC TR-EXT B9+MARG >4.0 CM: CPT

## 2021-08-03 PROBLEM — D17.1 LIPOMA OF BACK: Status: ACTIVE | Noted: 2021-08-02

## 2021-08-06 ENCOUNTER — NON-APPOINTMENT (OUTPATIENT)
Age: 66
End: 2021-08-06

## 2021-08-20 ENCOUNTER — LABORATORY RESULT (OUTPATIENT)
Age: 66
End: 2021-08-20

## 2021-08-20 ENCOUNTER — APPOINTMENT (OUTPATIENT)
Dept: ENDOCRINOLOGY | Facility: CLINIC | Age: 66
End: 2021-08-20
Payer: COMMERCIAL

## 2021-08-20 VITALS
DIASTOLIC BLOOD PRESSURE: 72 MMHG | WEIGHT: 159 LBS | SYSTOLIC BLOOD PRESSURE: 128 MMHG | HEIGHT: 66 IN | HEART RATE: 71 BPM | OXYGEN SATURATION: 98 % | BODY MASS INDEX: 25.55 KG/M2

## 2021-08-20 PROCEDURE — 99214 OFFICE O/P EST MOD 30 MIN: CPT

## 2021-08-20 NOTE — PHYSICAL EXAM
[Alert] : alert [Well Nourished] : well nourished [No Acute Distress] : no acute distress [Well Developed] : well developed [Normal Sclera/Conjunctiva] : normal sclera/conjunctiva [EOMI] : extra ocular movement intact [No Proptosis] : no proptosis [Normal Oropharynx] : the oropharynx was normal [Thyroid Not Enlarged] : the thyroid was not enlarged [No Respiratory Distress] : no respiratory distress [No Accessory Muscle Use] : no accessory muscle use [Clear to Auscultation] : lungs were clear to auscultation bilaterally [Normal S1, S2] : normal S1 and S2 [Normal Rate] : heart rate was normal [Regular Rhythm] : with a regular rhythm [No Edema] : no peripheral edema [Pedal Pulses Normal] : the pedal pulses are present [Normal Bowel Sounds] : normal bowel sounds [Not Tender] : non-tender [Not Distended] : not distended [Soft] : abdomen soft [Normal Anterior Cervical Nodes] : no anterior cervical lymphadenopathy [Normal Gait] : normal gait [No Rash] : no rash [No Tremors] : no tremors [Oriented x3] : oriented to person, place, and time [Acanthosis Nigricans] : no acanthosis nigricans [de-identified] : R thyroid enlarged

## 2021-08-20 NOTE — ASSESSMENT
[FreeTextEntry1] : NTMNG \par check TFts \par no dysphagia, no dysphonia, no neck pain, no voice change\par check thyroid US \par she has FNA done repeatedly last one 2017\par  \par Overweight:\par discussed diet and exercise\par encouraged more exercise walking 30 min 3 x week\par \par HTN ;  bp at target on meds. Continue current management.\par I advised low fat/low cholesterol diet, low salt diet, and weight loss\par \par osteopenia : DXA JAn 2021 FRAX score 18.7 / 1.3 %. \par continue calcium and vitamin d \par weight bearing exercises advsied to improve  BMD \par declines treatment anyway \par \par \par \par \par

## 2021-09-07 ENCOUNTER — APPOINTMENT (OUTPATIENT)
Dept: ULTRASOUND IMAGING | Facility: CLINIC | Age: 66
End: 2021-09-07
Payer: COMMERCIAL

## 2021-09-07 ENCOUNTER — OUTPATIENT (OUTPATIENT)
Dept: OUTPATIENT SERVICES | Facility: HOSPITAL | Age: 66
LOS: 1 days | End: 2021-09-07

## 2021-09-07 DIAGNOSIS — E04.2 NONTOXIC MULTINODULAR GOITER: ICD-10-CM

## 2021-09-07 PROCEDURE — 76536 US EXAM OF HEAD AND NECK: CPT | Mod: 26

## 2021-09-20 ENCOUNTER — NON-APPOINTMENT (OUTPATIENT)
Age: 66
End: 2021-09-20

## 2021-09-21 ENCOUNTER — RESULT REVIEW (OUTPATIENT)
Age: 66
End: 2021-09-21

## 2021-09-21 DIAGNOSIS — R59.0 LOCALIZED ENLARGED LYMPH NODES: ICD-10-CM

## 2021-09-22 ENCOUNTER — APPOINTMENT (OUTPATIENT)
Dept: RHEUMATOLOGY | Facility: CLINIC | Age: 66
End: 2021-09-22
Payer: COMMERCIAL

## 2021-09-22 VITALS
WEIGHT: 160 LBS | HEART RATE: 64 BPM | HEIGHT: 66 IN | OXYGEN SATURATION: 97 % | TEMPERATURE: 98.3 F | BODY MASS INDEX: 25.71 KG/M2 | RESPIRATION RATE: 17 BRPM | DIASTOLIC BLOOD PRESSURE: 72 MMHG | SYSTOLIC BLOOD PRESSURE: 156 MMHG

## 2021-09-22 PROCEDURE — 99214 OFFICE O/P EST MOD 30 MIN: CPT | Mod: 25

## 2021-09-22 PROCEDURE — 36415 COLL VENOUS BLD VENIPUNCTURE: CPT

## 2021-09-22 PROCEDURE — 81003 URINALYSIS AUTO W/O SCOPE: CPT | Mod: QW

## 2021-09-22 RX ORDER — CEPHALEXIN 500 MG/1
500 CAPSULE ORAL 3 TIMES DAILY
Qty: 21 | Refills: 0 | Status: DISCONTINUED | COMMUNITY
Start: 2021-08-02 | End: 2021-09-22

## 2021-09-26 LAB
ALBUMIN SERPL ELPH-MCNC: 4.7 G/DL
ALP BLD-CCNC: 89 U/L
ALT SERPL-CCNC: 22 U/L
ANION GAP SERPL CALC-SCNC: 13 MMOL/L
AST SERPL-CCNC: 22 U/L
BASOPHILS # BLD AUTO: 0.08 K/UL
BASOPHILS NFR BLD AUTO: 0.8 %
BILIRUB SERPL-MCNC: 0.2 MG/DL
BILIRUB UR QL STRIP: NEGATIVE
BUN SERPL-MCNC: 19 MG/DL
CALCIUM SERPL-MCNC: 10.2 MG/DL
CHLORIDE SERPL-SCNC: 103 MMOL/L
CK SERPL-CCNC: 82 U/L
CLARITY UR: CLEAR
CO2 SERPL-SCNC: 24 MMOL/L
COLLECTION METHOD: NORMAL
CREAT SERPL-MCNC: 0.62 MG/DL
CRP SERPL-MCNC: 3 MG/L
ENA SS-A AB SER IA-ACNC: <0.2 AL
ENA SS-B AB SER IA-ACNC: <0.2 AL
EOSINOPHIL # BLD AUTO: 0.38 K/UL
EOSINOPHIL NFR BLD AUTO: 4 %
ERYTHROCYTE [SEDIMENTATION RATE] IN BLOOD BY WESTERGREN METHOD: 18 MM/HR
GLUCOSE SERPL-MCNC: 94 MG/DL
GLUCOSE UR-MCNC: NEGATIVE
HCG UR QL: 0.2 EU/DL
HCT VFR BLD CALC: 42.3 %
HGB BLD-MCNC: 13.6 G/DL
HGB UR QL STRIP.AUTO: NEGATIVE
IMM GRANULOCYTES NFR BLD AUTO: 0.5 %
KETONES UR-MCNC: NEGATIVE
LEUKOCYTE ESTERASE UR QL STRIP: NORMAL
LYMPHOCYTES # BLD AUTO: 2.33 K/UL
LYMPHOCYTES NFR BLD AUTO: 24.7 %
MAN DIFF?: NORMAL
MCHC RBC-ENTMCNC: 29.6 PG
MCHC RBC-ENTMCNC: 32.2 GM/DL
MCV RBC AUTO: 92.2 FL
MONOCYTES # BLD AUTO: 0.74 K/UL
MONOCYTES NFR BLD AUTO: 7.8 %
NEUTROPHILS # BLD AUTO: 5.87 K/UL
NEUTROPHILS NFR BLD AUTO: 62.2 %
NITRITE UR QL STRIP: NEGATIVE
PH UR STRIP: 7
PHOSPHATE SERPL-MCNC: 4.3 MG/DL
PLATELET # BLD AUTO: 309 K/UL
POTASSIUM SERPL-SCNC: 4.2 MMOL/L
PROT SERPL-MCNC: 7.2 G/DL
PROT UR STRIP-MCNC: NEGATIVE
RBC # BLD: 4.59 M/UL
RBC # FLD: 13.9 %
SODIUM SERPL-SCNC: 141 MMOL/L
SP GR UR STRIP: 1.02
WBC # FLD AUTO: 9.45 K/UL

## 2021-10-05 ENCOUNTER — LABORATORY RESULT (OUTPATIENT)
Age: 66
End: 2021-10-05

## 2021-10-05 ENCOUNTER — OUTPATIENT (OUTPATIENT)
Dept: OUTPATIENT SERVICES | Facility: HOSPITAL | Age: 66
LOS: 1 days | End: 2021-10-05

## 2021-10-05 ENCOUNTER — APPOINTMENT (OUTPATIENT)
Dept: INTERVENTIONAL RADIOLOGY/VASCULAR | Facility: CLINIC | Age: 66
End: 2021-10-05
Payer: COMMERCIAL

## 2021-10-05 VITALS
DIASTOLIC BLOOD PRESSURE: 52 MMHG | HEART RATE: 78 BPM | TEMPERATURE: 98 F | RESPIRATION RATE: 16 BRPM | OXYGEN SATURATION: 99 % | SYSTOLIC BLOOD PRESSURE: 152 MMHG

## 2021-10-05 DIAGNOSIS — R59.0 LOCALIZED ENLARGED LYMPH NODES: ICD-10-CM

## 2021-10-05 LAB
BASOPHILS # BLD AUTO: 0.1 K/UL
BASOPHILS NFR BLD AUTO: 0.9 %
EOSINOPHIL # BLD AUTO: 0.5 K/UL
EOSINOPHIL NFR BLD AUTO: 4.5 %
HCT VFR BLD CALC: 45.5 %
HGB BLD-MCNC: 14.7 G/DL
IMM GRANULOCYTES NFR BLD AUTO: 0.8 %
INR PPP: 0.97 RATIO
LYMPHOCYTES # BLD AUTO: 2.41 K/UL
LYMPHOCYTES NFR BLD AUTO: 21.9 %
MAN DIFF?: NORMAL
MCHC RBC-ENTMCNC: 29.1 PG
MCHC RBC-ENTMCNC: 32.3 GM/DL
MCV RBC AUTO: 89.9 FL
MONOCYTES # BLD AUTO: 0.92 K/UL
MONOCYTES NFR BLD AUTO: 8.3 %
NEUTROPHILS # BLD AUTO: 7 K/UL
NEUTROPHILS NFR BLD AUTO: 63.6 %
PLATELET # BLD AUTO: 388 K/UL
PT BLD: 11.6 SEC
RBC # BLD: 5.06 M/UL
RBC # FLD: 13.5 %
SARS-COV-2 N GENE NPH QL NAA+PROBE: NOT DETECTED
WBC # FLD AUTO: 11.02 K/UL

## 2021-10-05 PROCEDURE — 10005 FNA BX W/US GDN 1ST LES: CPT

## 2021-10-05 NOTE — HISTORY OF PRESENT ILLNESS
[FreeTextEntry1] : PRE CALL PRIOR TO APPOINTMENT: \par \par Phone Number: [phone]\par \par COVID-19 SWAB: ADVISED TO GO \par \par RX on file: YES\par \par Referring MD: FAISAL  \par \par Clotting or Bleeding disorders:  DENIES\par \par PPM / Defibrillator: DENIES \par \par NPO status advised: N/A \par \par History of fall: DENIES\par \par Assistant device for walking: DENIES\par \par  home: NO \par \par Blood Thinners: DENIES                           \par \par Prescribing MD agree to have blood thinner medication held: N/A \par \par Capacity to make decisions: YES \par \par HCP: YES\par \par DNR: DENIES\par \par Person contact for Pre-Call: SELF \par \par --------------------------------------------------------------------------------------------------------\par \par \par \par Huma- Operative Assessment: (Day of Procedure)\par \par NPO status: n/a\par \par Falls risk: no\par \par Labs: IN CHART REVIEWED      \par \par IVL: n/a\par \par IR MD: Dr. Eliu Chandler \par \par Urine Pregnancy:n/a\par \par PRE-OP instructions:yes\par \par POST-OP teaching initiated: yes\par \par Allergy bracelet on:yes\par \par Antibiotic given: n/a\par \par

## 2021-10-05 NOTE — ASSESSMENT
[FreeTextEntry1] : US GUIDED FNA LEFT LEVEL 3 LN X 4 \par \par CYTOLOGY AND RPMI SENT\par \par FORMAL REPORT TO FOLLOW \par \par

## 2021-10-05 NOTE — REASON FOR VISIT
[Procedure: _________] : a [unfilled] procedure visit [FreeTextEntry1] : DIAGNOSIS: CERVICAL LYMPHADENOPATHY -

## 2022-02-02 ENCOUNTER — RESULT REVIEW (OUTPATIENT)
Age: 67
End: 2022-02-02

## 2022-02-02 ENCOUNTER — APPOINTMENT (OUTPATIENT)
Dept: RHEUMATOLOGY | Facility: CLINIC | Age: 67
End: 2022-02-02
Payer: COMMERCIAL

## 2022-02-02 VITALS
HEART RATE: 80 BPM | SYSTOLIC BLOOD PRESSURE: 144 MMHG | RESPIRATION RATE: 17 BRPM | DIASTOLIC BLOOD PRESSURE: 76 MMHG | TEMPERATURE: 98.2 F | OXYGEN SATURATION: 98 % | HEIGHT: 66 IN

## 2022-02-02 DIAGNOSIS — M79.644 PAIN IN RIGHT FINGER(S): ICD-10-CM

## 2022-02-02 DIAGNOSIS — Z23 ENCOUNTER FOR IMMUNIZATION: ICD-10-CM

## 2022-02-02 DIAGNOSIS — M79.645 PAIN IN RIGHT FINGER(S): ICD-10-CM

## 2022-02-02 PROCEDURE — 36415 COLL VENOUS BLD VENIPUNCTURE: CPT

## 2022-02-02 PROCEDURE — G2212 PROLONG OUTPT/OFFICE VIS: CPT

## 2022-02-02 PROCEDURE — 99215 OFFICE O/P EST HI 40 MIN: CPT | Mod: 25

## 2022-02-05 PROBLEM — Z23 COVID-19 VACCINE ADMINISTERED: Status: RESOLVED | Noted: 2022-02-05 | Resolved: 2022-02-05

## 2022-02-05 LAB
ALBUMIN SERPL ELPH-MCNC: 4.6 G/DL
ALP BLD-CCNC: 87 U/L
ALT SERPL-CCNC: 19 U/L
ANION GAP SERPL CALC-SCNC: 12 MMOL/L
AST SERPL-CCNC: 20 U/L
BASOPHILS # BLD AUTO: 0.1 K/UL
BASOPHILS NFR BLD AUTO: 0.9 %
BILIRUB SERPL-MCNC: 0.2 MG/DL
BUN SERPL-MCNC: 19 MG/DL
CALCIUM SERPL-MCNC: 10.1 MG/DL
CHLORIDE SERPL-SCNC: 102 MMOL/L
CK SERPL-CCNC: 71 U/L
CO2 SERPL-SCNC: 25 MMOL/L
CREAT SERPL-MCNC: 0.59 MG/DL
CRP SERPL-MCNC: <3 MG/L
ENA SS-A AB SER IA-ACNC: <0.2 AL
ENA SS-B AB SER IA-ACNC: <0.2 AL
EOSINOPHIL # BLD AUTO: 0.55 K/UL
EOSINOPHIL NFR BLD AUTO: 4.8 %
ERYTHROCYTE [SEDIMENTATION RATE] IN BLOOD BY WESTERGREN METHOD: 31 MM/HR
GLUCOSE SERPL-MCNC: 94 MG/DL
HCT VFR BLD CALC: 41.2 %
HGB BLD-MCNC: 13.1 G/DL
IMM GRANULOCYTES NFR BLD AUTO: 0.6 %
LDH SERPL-CCNC: 180 U/L
LYMPHOCYTES # BLD AUTO: 2.54 K/UL
LYMPHOCYTES NFR BLD AUTO: 22.2 %
MAN DIFF?: NORMAL
MCHC RBC-ENTMCNC: 28.4 PG
MCHC RBC-ENTMCNC: 31.8 GM/DL
MCV RBC AUTO: 89.4 FL
MONOCYTES # BLD AUTO: 0.76 K/UL
MONOCYTES NFR BLD AUTO: 6.6 %
NEUTROPHILS # BLD AUTO: 7.41 K/UL
NEUTROPHILS NFR BLD AUTO: 64.9 %
PHOSPHATE SERPL-MCNC: 3 MG/DL
PLATELET # BLD AUTO: 339 K/UL
POTASSIUM SERPL-SCNC: 4.1 MMOL/L
PROT SERPL-MCNC: 7.2 G/DL
RBC # BLD: 4.61 M/UL
RBC # FLD: 13.5 %
SODIUM SERPL-SCNC: 139 MMOL/L
WBC # FLD AUTO: 11.43 K/UL

## 2022-02-05 RX ORDER — ACETAMINOPHEN 500 MG/1
500 TABLET, COATED ORAL
Qty: 100 | Refills: 0 | Status: ACTIVE | COMMUNITY
Start: 2022-02-05

## 2022-02-05 NOTE — ADDENDUM
[FreeTextEntry1] : I, Rebecca Romero, acted solely as a scribe for Dr. Myron I. Kleiner, MD. on 02/02/2022 .

## 2022-02-05 NOTE — PHYSICAL EXAM
[General Appearance - Alert] : alert [General Appearance - In No Acute Distress] : in no acute distress [General Appearance - Well Nourished] : well nourished [General Appearance - Well Developed] : well developed [General Appearance - Well-Appearing] : healthy appearing [Sclera] : the sclera and conjunctiva were normal [PERRL With Normal Accommodation] : pupils were equal in size, round, and reactive to light [Extraocular Movements] : extraocular movements were intact [Outer Ear] : the ears and nose were normal in appearance [Neck Appearance] : the appearance of the neck was normal [Neck Cervical Mass (___cm)] : no neck mass was observed [Jugular Venous Distention Increased] : there was no jugular-venous distention [Thyroid Diffuse Enlargement] : the thyroid was not enlarged [Thyroid Nodule] : there were no palpable thyroid nodules [Lungs Percussion] : the lungs were normal to percussion [Heart Rate And Rhythm] : heart rate was normal and rhythm regular [Edema] : there was no peripheral edema [Abdomen Soft] : soft [Abdomen Tenderness] : non-tender [Abdomen Mass (___ Cm)] : no abdominal mass palpated [Cervical Lymph Nodes Enlarged Posterior Bilaterally] : posterior cervical [Cervical Lymph Nodes Enlarged Anterior Bilaterally] : anterior cervical [Supraclavicular Lymph Nodes Enlarged Bilaterally] : supraclavicular [Axillary Lymph Nodes Enlarged Bilaterally] : axillary [No CVA Tenderness] : no ~M costovertebral angle tenderness [No Spinal Tenderness] : no spinal tenderness [Skin Color & Pigmentation] : normal skin color and pigmentation [Skin Turgor] : normal skin turgor [] : no rash [Deep Tendon Reflexes (DTR)] : deep tendon reflexes were 2+ and symmetric [Sensation] : the sensory exam was normal to light touch and pinprick [Motor Exam] : the motor exam was normal [No Focal Deficits] : no focal deficits [Oriented To Time, Place, And Person] : oriented to person, place, and time [Impaired Insight] : insight and judgment were intact [Affect] : the affect was normal [Cranial Nerves] : cranial nerves 2-12 were intact [FreeTextEntry1] : Strength-5/5  [Mood] : the mood was normal

## 2022-02-05 NOTE — HISTORY OF PRESENT ILLNESS
[FreeTextEntry1] : ДМИТРИЙ EISENBERG is a 66 year old woman who presents for follow up office visit for further evaluation of joint symptoms and rheumatic diseases including osteoarthritis, fibromyalgia, Sjogren's syndrome, osteoporosis.\par \par Note: Patient last seen September 2021\par \par Patient feels fairly well. She has some pain right wrist and base of right thumb. Denies recent sleep disturbance and fatigue. Denies recent dry eyes and dry mouth, using artificial tears and biotene mouthwash with relief. Denies back pain. Intermittent left knee pain, she continues knee exercises with 8 repetitions morning and evening. Not exercising regularly. The patient continues calcium and vitamin D supplements.  She had run out of the vitamin D prescription once a week so she has been taking OTC vitamin D3 once daily (?  Dose).  Patient denies rash or side effects with current medications. Patient is content with current medication regimen. \par \par PMH:\par January 3, 2022 - coronavirus infection with cough, myalgias without hospitalization\par S/P coronavirus vaccine x1 (J&J) without complication\par S/P flu vaccine without complication \par \par SH:\par  - coronavirus infection with hospitalization x8 days 
details…

## 2022-02-05 NOTE — REVIEW OF SYSTEMS
[As Noted in HPI] : as noted in HPI [Joint Pain] : joint pain [Negative] : Heme/Lymph [Feeling Tired] : not feeling tired [Dry Eyes] : no dryness of the eyes

## 2022-02-05 NOTE — ASSESSMENT
[FreeTextEntry1] : Impression: ДМИТРИЙ EISENBERG is a 66 year old woman who presents for follow up office visit for further evaluation of joint symptoms and rheumatic diseases including osteoarthritis, fibromyalgia, Sjogren's syndrome, osteopenia.\par \par Note: Patient last seen September 2021\par \par Patient feels fairly well. She has some pain right wrist, base of right thumb and intermittent left knee pain secondary to osteoarthritis. She continues knee exercises. Her fibromyalgia is under good control with no recent sleep disturbance and fatigue. Denies recent dry eyes and dry mouth with her Sjogren's Syndrome also under good control, using artificial tears and biotene mouthwash with relief. Recent lab tests revealed no significant results or changes. The patient continues calcium and vitamin D supplements for her osteopenia. Patient denies rash or side effects with current medications. Patient is content with current medication regimen. \par \par Plan: I reviewed recent lab results with patient with extensive discussion\par Laboratory tests ordered today-see list below\par X-rays ordered - see list below\par Diagnosis and prognosis discussed\par Continue other current medications (other than those changed below)\par Tylenol 1000 mg t.i.d. p.r.n. joint pain or Tylenol 1300 mg b.i.d. p.r.n. joint pain (possible side effects explained)\par Discontinue OTC vitamin D\par Restart vitamin D 50,000 units once a week (Possible side effects explained)\par Artificial tears one drop each eye q.i.d. and p.r.n.(Possible side effects explained)\par Biotene mouthwash/spray q.i.d. and p.r.n.(Possible side effects explained) \par Oral Hydration\par Gradually increasing the exercises by 2 repetitions each day going up to at least 30 per session each day--emphasized\par Daily exercise starting at 10 minutes per day, gradually increasing to at least 30 minutes per day--emphasized \par I urged her to obtain the booster dose for the Covid vaccine--extensive and lengthy discussion\par Return visit 3 months\par Total time for this office visit, including face-to-face time and nonface-to-face time, 85 minutes--- including review the chart and previous records, review of her recent lab results with extensive discussion with the patient, review of previous imaging reports, ordering of new x-rays with extensive discussion with the patient, extensive discussion regarding her knee exercises.  Further instructions given, further discussion regarding her daily cardio exercise, discussion regarding the need for her to get the booster vaccine for the Covid, reviewed the impact of her rheumatic disease on her other medical problems, reviewed the impact of her other medical problems on her rheumatic disease\par

## 2022-02-09 ENCOUNTER — OUTPATIENT (OUTPATIENT)
Dept: OUTPATIENT SERVICES | Facility: HOSPITAL | Age: 67
LOS: 1 days | End: 2022-02-09
Payer: COMMERCIAL

## 2022-02-09 ENCOUNTER — APPOINTMENT (OUTPATIENT)
Dept: RADIOLOGY | Facility: CLINIC | Age: 67
End: 2022-02-09
Payer: COMMERCIAL

## 2022-02-09 DIAGNOSIS — M15.9 POLYOSTEOARTHRITIS, UNSPECIFIED: ICD-10-CM

## 2022-02-09 DIAGNOSIS — M35.00 SJOGREN SYNDROME, UNSPECIFIED: ICD-10-CM

## 2022-02-09 DIAGNOSIS — M79.7 FIBROMYALGIA: ICD-10-CM

## 2022-02-09 DIAGNOSIS — M54.42 LUMBAGO WITH SCIATICA, LEFT SIDE: ICD-10-CM

## 2022-02-09 PROCEDURE — 73110 X-RAY EXAM OF WRIST: CPT | Mod: 26,50

## 2022-02-09 PROCEDURE — 72050 X-RAY EXAM NECK SPINE 4/5VWS: CPT | Mod: 26

## 2022-02-09 PROCEDURE — 72050 X-RAY EXAM NECK SPINE 4/5VWS: CPT

## 2022-02-09 PROCEDURE — 73110 X-RAY EXAM OF WRIST: CPT

## 2022-02-09 PROCEDURE — 73562 X-RAY EXAM OF KNEE 3: CPT | Mod: 26,50

## 2022-02-09 PROCEDURE — 71046 X-RAY EXAM CHEST 2 VIEWS: CPT

## 2022-02-09 PROCEDURE — 73130 X-RAY EXAM OF HAND: CPT | Mod: 26,50

## 2022-02-09 PROCEDURE — 71046 X-RAY EXAM CHEST 2 VIEWS: CPT | Mod: 26

## 2022-02-09 PROCEDURE — 73130 X-RAY EXAM OF HAND: CPT

## 2022-02-09 PROCEDURE — 73562 X-RAY EXAM OF KNEE 3: CPT

## 2022-06-01 ENCOUNTER — APPOINTMENT (OUTPATIENT)
Dept: RHEUMATOLOGY | Facility: CLINIC | Age: 67
End: 2022-06-01

## 2022-08-17 ENCOUNTER — APPOINTMENT (OUTPATIENT)
Dept: ULTRASOUND IMAGING | Facility: CLINIC | Age: 67
End: 2022-08-17

## 2022-08-22 ENCOUNTER — APPOINTMENT (OUTPATIENT)
Dept: ENDOCRINOLOGY | Facility: CLINIC | Age: 67
End: 2022-08-22

## 2022-08-22 PROCEDURE — 99214 OFFICE O/P EST MOD 30 MIN: CPT | Mod: 95

## 2022-08-22 NOTE — ASSESSMENT
[FreeTextEntry1] : NTMNG \par declines compressive sx \par FNA MArch 2021 bening . Repeat US now to evaluate nodules for growth\par check tfts \par  \par Overweight: gained 5lbs. she will restart WW \par discussed diet and exercise\par encouraged more exercise walking 30 min 3 x week\par \par HTN : Continue current management.\par I advised low fat/low cholesterol diet, low salt diet, and weight loss\par \par osteopenia : DXA Jan 2021 FRAX score 18.7 / 1.3 %. \par continue calcium and vitamin d \par declines treatment\par \par rtc in 1 yr \par Verbal consent obtained from patient for telemedicine .\par Discussed with patient given unable to provide physical exam, evaluation done on symptoms only. \par All lab results reviewed and discussed with patient. All questions answered\par This was a telehealth service rendered via interactive audio and video telecommunication\par  system.\par \par \par \par \par \par

## 2022-08-22 NOTE — HISTORY OF PRESENT ILLNESS
[Home] : at home, [unfilled] , at the time of the visit. [Medical Office: ([facility])___] : at the medical office located in  [Verbal consent obtained from patient] : the patient, [unfilled] [FreeTextEntry1] : NTMNG  \par onset 2016\par

## 2022-09-02 ENCOUNTER — LABORATORY RESULT (OUTPATIENT)
Age: 67
End: 2022-09-02

## 2022-09-02 ENCOUNTER — APPOINTMENT (OUTPATIENT)
Dept: RHEUMATOLOGY | Facility: CLINIC | Age: 67
End: 2022-09-02

## 2022-09-02 VITALS
SYSTOLIC BLOOD PRESSURE: 126 MMHG | TEMPERATURE: 98.6 F | OXYGEN SATURATION: 98 % | DIASTOLIC BLOOD PRESSURE: 83 MMHG | HEART RATE: 83 BPM

## 2022-09-02 DIAGNOSIS — Z87.39 PERSONAL HISTORY OF OTHER DISEASES OF THE MUSCULOSKELETAL SYSTEM AND CONNECTIVE TISSUE: ICD-10-CM

## 2022-09-02 PROCEDURE — 99215 OFFICE O/P EST HI 40 MIN: CPT

## 2022-09-02 RX ORDER — MULTIVIT-MIN/FOLIC/VIT K/LYCOP 400-300MCG
1000 TABLET ORAL
Refills: 0 | Status: DISCONTINUED | COMMUNITY
End: 2022-09-02

## 2022-09-02 NOTE — REVIEW OF SYSTEMS
[Feeling Tired] : feeling tired [Dry Eyes] : dryness of the eyes [As Noted in HPI] : as noted in HPI [Joint Pain] : joint pain [Negative] : Heme/Lymph

## 2022-09-05 LAB
ALBUMIN SERPL ELPH-MCNC: 4.4 G/DL
ALP BLD-CCNC: 87 U/L
ALT SERPL-CCNC: 24 U/L
ANION GAP SERPL CALC-SCNC: 12 MMOL/L
AST SERPL-CCNC: 22 U/L
BASOPHILS # BLD AUTO: 0.09 K/UL
BASOPHILS NFR BLD AUTO: 0.9 %
BILIRUB SERPL-MCNC: 0.2 MG/DL
BUN SERPL-MCNC: 14 MG/DL
CALCIUM SERPL-MCNC: 9.6 MG/DL
CHLORIDE SERPL-SCNC: 106 MMOL/L
CO2 SERPL-SCNC: 24 MMOL/L
CREAT SERPL-MCNC: 0.58 MG/DL
CRP SERPL-MCNC: 3 MG/L
EGFR: 100 ML/MIN/1.73M2
ENA SS-A AB SER IA-ACNC: <0.2 AL
ENA SS-B AB SER IA-ACNC: <0.2 AL
EOSINOPHIL # BLD AUTO: 0.49 K/UL
EOSINOPHIL NFR BLD AUTO: 5 %
ERYTHROCYTE [SEDIMENTATION RATE] IN BLOOD BY WESTERGREN METHOD: 17 MM/HR
GLUCOSE SERPL-MCNC: 136 MG/DL
HCT VFR BLD CALC: 39.6 %
HGB BLD-MCNC: 12.7 G/DL
IGG SUBSET TOTAL IGG: 1098 MG/DL
IGG1 SER-MCNC: 698 MG/DL
IGG2 SER-MCNC: 284 MG/DL
IGG3 SER-MCNC: 26 MG/DL
IGG4 SER-MCNC: 22 MG/DL
IMM GRANULOCYTES NFR BLD AUTO: 0.5 %
LDH SERPL-CCNC: 155 U/L
LYMPHOCYTES # BLD AUTO: 2.29 K/UL
LYMPHOCYTES NFR BLD AUTO: 23.6 %
MAN DIFF?: NORMAL
MCHC RBC-ENTMCNC: 28.6 PG
MCHC RBC-ENTMCNC: 32.1 GM/DL
MCV RBC AUTO: 89.2 FL
MONOCYTES # BLD AUTO: 0.63 K/UL
MONOCYTES NFR BLD AUTO: 6.5 %
NEUTROPHILS # BLD AUTO: 6.17 K/UL
NEUTROPHILS NFR BLD AUTO: 63.5 %
PHOSPHATE SERPL-MCNC: 2.4 MG/DL
PLATELET # BLD AUTO: 322 K/UL
POTASSIUM SERPL-SCNC: 4.1 MMOL/L
PROT SERPL-MCNC: 6.8 G/DL
RBC # BLD: 4.44 M/UL
RBC # FLD: 14.1 %
SODIUM SERPL-SCNC: 142 MMOL/L
T3 SERPL-MCNC: 124 NG/DL
T3RU NFR SERPL: 1.1 TBI
T4 SERPL-MCNC: 7.8 UG/DL
TSH SERPL-ACNC: 0.43 UIU/ML
WBC # FLD AUTO: 9.72 K/UL

## 2022-09-15 LAB
ALBUMIN MFR SERPL ELPH: 59.2 %
ALBUMIN SERPL-MCNC: 4 G/DL
ALBUMIN/GLOB SERPL: 1.4 RATIO
ALPHA1 GLOB MFR SERPL ELPH: 4.5 %
ALPHA1 GLOB SERPL ELPH-MCNC: 0.3 G/DL
ALPHA2 GLOB MFR SERPL ELPH: 10 %
ALPHA2 GLOB SERPL ELPH-MCNC: 0.7 G/DL
B-GLOBULIN MFR SERPL ELPH: 10.7 %
B-GLOBULIN SERPL ELPH-MCNC: 0.7 G/DL
DEPRECATED KAPPA LC FREE/LAMBDA SER: 0.79 RATIO
GAMMA GLOB FLD ELPH-MCNC: 1.1 G/DL
GAMMA GLOB MFR SERPL ELPH: 15.6 %
IGA SER QL IEP: 136 MG/DL
IGG SER QL IEP: 1134 MG/DL
IGM SER QL IEP: 56 MG/DL
INTERPRETATION SERPL IEP-IMP: NORMAL
KAPPA LC CSF-MCNC: 1.79 MG/DL
KAPPA LC SERPL-MCNC: 1.41 MG/DL
M PROTEIN SPEC IFE-MCNC: NORMAL
PROT SERPL-MCNC: 6.8 G/DL
PROT SERPL-MCNC: 6.8 G/DL

## 2022-09-18 NOTE — HISTORY OF PRESENT ILLNESS
[FreeTextEntry1] : ДМИТРИЙ EISENBERG is a 66 year old woman who presents for follow up office visit for further evaluation of joint symptoms and rheumatic diseases including osteoarthritis, fibromyalgia, Sjogren's syndrome and osteopenia.\par \par Note: Patient last seen February 2022\par \par Patient feels fairly well. Occasional sleep disturbance and fatigue. Some dry eyes and dry mouth, using artificial tears and biotene mouthwash with relief. Occasional low back pain with radiation to bilateral lower extremities to the toes (left greater than right). Tylenol with variable relief. The patient continues calcium and vitamin D supplements. Patient denies rash or side effects with current medications. Patient is content with current medication regimen. \par \par PMH:\par Endocrinology follow up regarding Thyroid nodules - stable\par \par SH: Travel to [place] 3 months ago

## 2022-09-18 NOTE — ADDENDUM
[FreeTextEntry1] : I, Rebecca Romero, acted solely as a scribe for Dr. Myron I. Kleiner, MD. on 09/02/2022.

## 2022-09-18 NOTE — ASSESSMENT
[FreeTextEntry1] : Impression:  ДМИТРИЙ EISEBNERG is a 66 year old woman who presents for follow up office visit for further evaluation of joint symptoms and rheumatic diseases including osteoarthritis, fibromyalgia, Sjogren's syndrome and osteopenia.\par \par Note: Patient last seen February 2022\par \par Patient feels fairly well. Occasional sleep disturbance and fatigue secondary to her fibromyalgia. Some dry eyes and dry mouth consistent with Sjogren's Syndrome, using artificial tears and biotene mouthwash with relief. Occasional low back pain with radiation to bilateral lower extremities to the toes (left greater than right) secondary to a combination of her osteoarthritis, fibromyalgia and LS radiculopathy. Recent x-rays revealed osteoarthritis in various joints, otherwise no significant results or changes. Recent lab tests revealed no significant results or changes. Tylenol with variable relief. The patient continues calcium and vitamin D supplements for her osteopenia and previous history of osteoporosis. Patient denies rash or side effects with current medications. Patient is content with current medication regimen. \par \par Plan: I reviewed recent lab results with patient with extensive discussion\par I reviewed recent X-ray results with patient with extensive discussion\par Laboratory tests ordered - see list below - with coordination of care \par Bone Densitometry Ordered - with coordination of care - to be performed after January 22, 2023\par Diagnosis and prognosis discussed\par Continue current medications (other than those changed below)\par Ibuprofen 600 mg t.i.d. p.r.n. end of meals (Possible side effects explained including cardiovascular risk/MI/CVA) - patient declines taking it steadily \par Artificial tears one drop each eye q.i.d. and p.r.n.(Possible side effects explained)\par Biotene mouthwash/spray q.i.d. and p.r.n.(Possible side effects explained) \par Oral Hydration\par Patient declines oral medication for dryness \par Daily exercise starting at 10 minutes per day, gradually increasing to at least 30 minutes per day - emphasized \par Return visit 3 months

## 2022-09-20 ENCOUNTER — OUTPATIENT (OUTPATIENT)
Dept: OUTPATIENT SERVICES | Facility: HOSPITAL | Age: 67
LOS: 1 days | End: 2022-09-20

## 2022-09-20 ENCOUNTER — APPOINTMENT (OUTPATIENT)
Dept: ULTRASOUND IMAGING | Facility: CLINIC | Age: 67
End: 2022-09-20

## 2022-09-20 DIAGNOSIS — Z00.8 ENCOUNTER FOR OTHER GENERAL EXAMINATION: ICD-10-CM

## 2022-09-20 PROCEDURE — 76536 US EXAM OF HEAD AND NECK: CPT | Mod: 26

## 2022-09-21 LAB
ALBUMIN SERPL ELPH-MCNC: 4.5 G/DL
ALP BLD-CCNC: 98 U/L
ALT SERPL-CCNC: 18 U/L
ANION GAP SERPL CALC-SCNC: 12 MMOL/L
AST SERPL-CCNC: 19 U/L
BASOPHILS # BLD AUTO: 0.08 K/UL
BASOPHILS NFR BLD AUTO: 0.8 %
BILIRUB SERPL-MCNC: 0.2 MG/DL
BUN SERPL-MCNC: 14 MG/DL
CALCIUM SERPL-MCNC: 9.6 MG/DL
CHLORIDE SERPL-SCNC: 107 MMOL/L
CO2 SERPL-SCNC: 24 MMOL/L
CREAT SERPL-MCNC: 0.62 MG/DL
EGFR: 98 ML/MIN/1.73M2
EOSINOPHIL # BLD AUTO: 0.43 K/UL
EOSINOPHIL NFR BLD AUTO: 4.3 %
GLUCOSE SERPL-MCNC: 120 MG/DL
HCT VFR BLD CALC: 42.3 %
HGB BLD-MCNC: 13.7 G/DL
IMM GRANULOCYTES NFR BLD AUTO: 0.4 %
LYMPHOCYTES # BLD AUTO: 2.43 K/UL
LYMPHOCYTES NFR BLD AUTO: 24.1 %
MAN DIFF?: NORMAL
MCHC RBC-ENTMCNC: 29.4 PG
MCHC RBC-ENTMCNC: 32.4 GM/DL
MCV RBC AUTO: 90.8 FL
MONOCYTES # BLD AUTO: 0.72 K/UL
MONOCYTES NFR BLD AUTO: 7.1 %
NEUTROPHILS # BLD AUTO: 6.4 K/UL
NEUTROPHILS NFR BLD AUTO: 63.3 %
PLATELET # BLD AUTO: 357 K/UL
POTASSIUM SERPL-SCNC: 4.1 MMOL/L
PROT SERPL-MCNC: 7 G/DL
RBC # BLD: 4.66 M/UL
RBC # FLD: 14 %
SODIUM SERPL-SCNC: 143 MMOL/L
T3 SERPL-MCNC: 128 NG/DL
T4 FREE SERPL-MCNC: 1.3 NG/DL
TSH SERPL-ACNC: 0.87 UIU/ML
WBC # FLD AUTO: 10.1 K/UL

## 2023-01-26 ENCOUNTER — OUTPATIENT (OUTPATIENT)
Dept: OUTPATIENT SERVICES | Facility: HOSPITAL | Age: 68
LOS: 1 days | End: 2023-01-26
Payer: COMMERCIAL

## 2023-01-26 ENCOUNTER — APPOINTMENT (OUTPATIENT)
Dept: RADIOLOGY | Facility: CLINIC | Age: 68
End: 2023-01-26
Payer: MEDICARE

## 2023-01-26 DIAGNOSIS — Z87.39 PERSONAL HISTORY OF OTHER DISEASES OF THE MUSCULOSKELETAL SYSTEM AND CONNECTIVE TISSUE: ICD-10-CM

## 2023-01-26 DIAGNOSIS — M85.80 OTHER SPECIFIED DISORDERS OF BONE DENSITY AND STRUCTURE, UNSPECIFIED SITE: ICD-10-CM

## 2023-01-26 PROCEDURE — 77085 DXA BONE DENSITY AXL VRT FX: CPT

## 2023-01-26 PROCEDURE — 77085 DXA BONE DENSITY AXL VRT FX: CPT | Mod: 26

## 2023-01-30 ENCOUNTER — APPOINTMENT (OUTPATIENT)
Dept: RHEUMATOLOGY | Facility: CLINIC | Age: 68
End: 2023-01-30
Payer: MEDICARE

## 2023-01-30 ENCOUNTER — NON-APPOINTMENT (OUTPATIENT)
Age: 68
End: 2023-01-30

## 2023-01-30 VITALS
BODY MASS INDEX: 26.68 KG/M2 | OXYGEN SATURATION: 97 % | WEIGHT: 166 LBS | TEMPERATURE: 97.8 F | RESPIRATION RATE: 17 BRPM | DIASTOLIC BLOOD PRESSURE: 74 MMHG | HEIGHT: 66 IN | SYSTOLIC BLOOD PRESSURE: 150 MMHG | HEART RATE: 68 BPM

## 2023-01-30 DIAGNOSIS — M25.562 PAIN IN LEFT KNEE: ICD-10-CM

## 2023-01-30 DIAGNOSIS — G89.29 PAIN IN LEFT KNEE: ICD-10-CM

## 2023-01-30 DIAGNOSIS — M54.17 RADICULOPATHY, LUMBOSACRAL REGION: ICD-10-CM

## 2023-01-30 DIAGNOSIS — M21.952 UNSPECIFIED ACQUIRED DEFORMITY OF RIGHT THIGH: ICD-10-CM

## 2023-01-30 DIAGNOSIS — M21.951 UNSPECIFIED ACQUIRED DEFORMITY OF RIGHT THIGH: ICD-10-CM

## 2023-01-30 PROCEDURE — 99214 OFFICE O/P EST MOD 30 MIN: CPT

## 2023-01-31 NOTE — HISTORY OF PRESENT ILLNESS
[FreeTextEntry1] : ДМИТРИЙ EISENBERG is a 67 year old woman who presents for follow up office visit for further evaluation of joint symptoms and rheumatic diseases including osteoarthritis, fibromyalgia, Sjogren's syndrome and osteopenia.\par \par Patient feels fairly well.  Past 2 months with some increased left knee pain and upper back pain especially with prolonged standing and walking - New PCP ordered x-rays (completed January 27, 2023 via [employer]).  PCP prescribed meloxicam 15 mg daily as needed.  She also takes ibuprofen 400 mg daily as needed with good relief.  Denies recent dry eyes and dry mouth. Some sleep disturbance and fatigue. The patient continues calcium and vitamin D supplements. Patient denies rash or side effects with current medications. Patient is content with current medication regimen.

## 2023-01-31 NOTE — PHYSICAL EXAM
[General Appearance - Alert] : alert [General Appearance - In No Acute Distress] : in no acute distress [General Appearance - Well Nourished] : well nourished [General Appearance - Well Developed] : well developed [General Appearance - Well-Appearing] : healthy appearing [Sclera] : the sclera and conjunctiva were normal [PERRL With Normal Accommodation] : pupils were equal in size, round, and reactive to light [Extraocular Movements] : extraocular movements were intact [Outer Ear] : the ears and nose were normal in appearance [Neck Appearance] : the appearance of the neck was normal [Neck Cervical Mass (___cm)] : no neck mass was observed [Jugular Venous Distention Increased] : there was no jugular-venous distention [Thyroid Diffuse Enlargement] : the thyroid was not enlarged [Thyroid Nodule] : there were no palpable thyroid nodules [Heart Rate And Rhythm] : heart rate was normal and rhythm regular [Lungs Percussion] : the lungs were normal to percussion [Edema] : there was no peripheral edema [Abdomen Soft] : soft [Abdomen Tenderness] : non-tender [Abdomen Mass (___ Cm)] : no abdominal mass palpated [Cervical Lymph Nodes Enlarged Posterior Bilaterally] : posterior cervical [Cervical Lymph Nodes Enlarged Anterior Bilaterally] : anterior cervical [Supraclavicular Lymph Nodes Enlarged Bilaterally] : supraclavicular [Axillary Lymph Nodes Enlarged Bilaterally] : axillary [No CVA Tenderness] : no ~M costovertebral angle tenderness [No Spinal Tenderness] : no spinal tenderness [Skin Turgor] : normal skin turgor [Skin Color & Pigmentation] : normal skin color and pigmentation [] : no rash [Cranial Nerves] : cranial nerves 2-12 were intact [Deep Tendon Reflexes (DTR)] : deep tendon reflexes were 2+ and symmetric [Sensation] : the sensory exam was normal to light touch and pinprick [Motor Exam] : the motor exam was normal [No Focal Deficits] : no focal deficits [Oriented To Time, Place, And Person] : oriented to person, place, and time [Impaired Insight] : insight and judgment were intact [Affect] : the affect was normal [Mood] : the mood was normal [FreeTextEntry1] : Shoulders/Elbows- normal\par Wrists- crepitus bilateral 1st CMC joints\par Hands- Heberden's nodes\par Hips- bilateral decreased external rotation, right decreased internal rotation \par Knees- bilateral crepitus\par Ankles/Feet- normal; Strength-5/5

## 2023-01-31 NOTE — CONSULT LETTER
[Dear  ___] : Dear  [unfilled], [Consult Letter:] : I had the pleasure of evaluating your patient, [unfilled]. [Please see my note below.] : Please see my note below. [Consult Closing:] : Thank you very much for allowing me to participate in the care of this patient.  If you have any questions, please do not hesitate to contact me. [Sincerely,] : Sincerely, [FreeTextEntry3] : Zack\par Myron I. Kleiner, M.D., FACR\par Chief, Division of Rheumatology\par Department of Medicine\par [facility]

## 2023-01-31 NOTE — ASSESSMENT
[FreeTextEntry1] : Impression: ДМИТРИЙ EISENBERG is a 67 year old woman who presents for follow up office visit for further evaluation of joint symptoms and rheumatic diseases including osteoarthritis, fibromyalgia, Sjogren's syndrome and osteoporosis with wedge compression T12.\par \par Patient feels fairly well, although she has 2-month history of some increased left knee pain especially with prolonged standing and walking secondary to her osteoarthritis - New PCP ordered x-rays (completed January 27, 2023 via [facility]). She also has some upper back pain also secondary to her osteoarthritis and fibromyalgia with some sleep disturbance and fatigue. Denies recent dry eyes and dry mouth, secondary to her Sjogren's syndrome.. The patient continues calcium and vitamin D supplements for her osteopenia. Recent bone densitometry revealed osteopenia with wedge compression at T12 which places it in the realm of osteoporosis, requiring treatment.. Patient denies rash or side effects with current medications. Patient is content with current medication regimen. \par \par Plan: I reviewed recent lab results with patient with extensive discussion\par I reviewed recent Bone densitometry results with patient including analysis of raw data with extensive discussion \par Laboratory tests ordered - see list below - with coordination of care \par Diagnosis and prognosis discussed\par Continue current medications (other than those changed below)\par Discontinue Meloxicam \par Continue increase ibuprofen 600 mg t.i.d. end of meals (Possible side effects explained including cardiovascular risk/MI/CVA) \par Actonel 150 mg q.1 month on empty stomach with large glass tap water 30 minutes a.c. breakfast and do not lie down for 2 hours (possible side effects explained including AVN of the jaw) \par If Actonel is not covered by health insurance, I will prescribe Fosamax 70 mg p.o. once a week on empty stomach with large glass of tap water 30 minutes a.c. breakfast--do not lie down for 2 hours (possible side effects explained including AVN of jaw) \par Artificial tears one drop each eye q.i.d. and p.r.n.(Possible side effects explained)\par Biotene mouthwash/spray q.i.d. and p.r.n.(Possible side effects explained) \par Oral Hydration\par Patient declines oral medication for dryness \par Daily exercise starting at 10 minutes per day, gradually increasing to at least 30 minutes per day - emphasized \par Return visit 3 months

## 2023-02-05 LAB
25(OH)D3 SERPL-MCNC: 65.7 NG/ML
ALBUMIN SERPL ELPH-MCNC: 4.5 G/DL
ALP BLD-CCNC: 92 U/L
ALT SERPL-CCNC: 27 U/L
ANION GAP SERPL CALC-SCNC: 14 MMOL/L
APPEARANCE: CLEAR
AST SERPL-CCNC: 25 U/L
BACTERIA: NEGATIVE
BASOPHILS # BLD AUTO: 0.1 K/UL
BASOPHILS NFR BLD AUTO: 1 %
BILIRUB SERPL-MCNC: 0.4 MG/DL
BILIRUBIN URINE: NEGATIVE
BLOOD URINE: NEGATIVE
BUN SERPL-MCNC: 16 MG/DL
CALCIUM SERPL-MCNC: 9.6 MG/DL
CALCIUM SERPL-MCNC: 9.6 MG/DL
CCP AB SER IA-ACNC: 12 UNITS
CHLORIDE SERPL-SCNC: 101 MMOL/L
CK SERPL-CCNC: 55 U/L
CO2 SERPL-SCNC: 24 MMOL/L
COLOR: YELLOW
CREAT SERPL-MCNC: 0.65 MG/DL
CRP SERPL-MCNC: <3 MG/L
EGFR: 97 ML/MIN/1.73M2
EOSINOPHIL # BLD AUTO: 0.28 K/UL
EOSINOPHIL NFR BLD AUTO: 2.8 %
ERYTHROCYTE [SEDIMENTATION RATE] IN BLOOD BY WESTERGREN METHOD: 27 MM/HR
GLIADIN IGA SER QL: <5 UNITS
GLIADIN IGG SER QL: <5 UNITS
GLIADIN PEPTIDE IGA SER-ACNC: NEGATIVE
GLIADIN PEPTIDE IGG SER-ACNC: NEGATIVE
GLUCOSE QUALITATIVE U: NEGATIVE
GLUCOSE SERPL-MCNC: 100 MG/DL
HCT VFR BLD CALC: 43.2 %
HGB BLD-MCNC: 13.5 G/DL
HYALINE CASTS: 4 /LPF
IMM GRANULOCYTES NFR BLD AUTO: 0.7 %
KETONES URINE: NEGATIVE
LDH SERPL-CCNC: 159 U/L
LEUKOCYTE ESTERASE URINE: NEGATIVE
LYMPHOCYTES # BLD AUTO: 2.33 K/UL
LYMPHOCYTES NFR BLD AUTO: 23.4 %
MAGNESIUM SERPL-MCNC: 2.1 MG/DL
MAN DIFF?: NORMAL
MCHC RBC-ENTMCNC: 28.3 PG
MCHC RBC-ENTMCNC: 31.3 GM/DL
MCV RBC AUTO: 90.6 FL
MICROSCOPIC-UA: NORMAL
MONOCYTES # BLD AUTO: 0.83 K/UL
MONOCYTES NFR BLD AUTO: 8.3 %
NEUTROPHILS # BLD AUTO: 6.36 K/UL
NEUTROPHILS NFR BLD AUTO: 63.8 %
NITRITE URINE: NEGATIVE
PARATHYROID HORMONE INTACT: 27 PG/ML
PH URINE: 6.5
PHOSPHATE SERPL-MCNC: 4.4 MG/DL
PLATELET # BLD AUTO: 335 K/UL
POTASSIUM SERPL-SCNC: 4.2 MMOL/L
PROT SERPL-MCNC: 7.4 G/DL
PROTEIN URINE: NORMAL
RBC # BLD: 4.77 M/UL
RBC # FLD: 14.2 %
RED BLOOD CELLS URINE: 4 /HPF
RF+CCP IGG SER-IMP: NEGATIVE
RHEUMATOID FACT SER QL: <10 IU/ML
SODIUM SERPL-SCNC: 138 MMOL/L
SPECIFIC GRAVITY URINE: 1.03
SQUAMOUS EPITHELIAL CELLS: 8 /HPF
TTG IGA SER IA-ACNC: <1.2 U/ML
TTG IGA SER-ACNC: NEGATIVE
UROBILINOGEN URINE: NORMAL
WBC # FLD AUTO: 9.97 K/UL
WHITE BLOOD CELLS URINE: 7 /HPF

## 2023-02-07 LAB
ENA SS-A AB SER IA-ACNC: <0.2 AL
ENA SS-B AB SER IA-ACNC: <0.2 AL

## 2023-02-27 ENCOUNTER — APPOINTMENT (OUTPATIENT)
Dept: ORTHOPEDIC SURGERY | Facility: CLINIC | Age: 68
End: 2023-02-27

## 2023-03-01 LAB
ENDOMYSIUM IGA SER QL: NEGATIVE
ENDOMYSIUM IGA TITR SER: NORMAL
TTG IGG SER IA-ACNC: 3.4 U/ML
TTG IGG SER IA-ACNC: NEGATIVE

## 2023-04-25 ENCOUNTER — APPOINTMENT (OUTPATIENT)
Dept: RHEUMATOLOGY | Facility: CLINIC | Age: 68
End: 2023-04-25
Payer: MEDICARE

## 2023-04-25 VITALS
HEART RATE: 66 BPM | SYSTOLIC BLOOD PRESSURE: 128 MMHG | WEIGHT: 170 LBS | TEMPERATURE: 98 F | RESPIRATION RATE: 17 BRPM | HEIGHT: 66 IN | OXYGEN SATURATION: 97 % | BODY MASS INDEX: 27.32 KG/M2 | DIASTOLIC BLOOD PRESSURE: 76 MMHG

## 2023-04-25 DIAGNOSIS — M85.80 OTHER SPECIFIED DISORDERS OF BONE DENSITY AND STRUCTURE, UNSPECIFIED SITE: ICD-10-CM

## 2023-04-25 DIAGNOSIS — M25.562 PAIN IN RIGHT KNEE: ICD-10-CM

## 2023-04-25 DIAGNOSIS — M79.645 PAIN IN RIGHT FINGER(S): ICD-10-CM

## 2023-04-25 DIAGNOSIS — M25.561 PAIN IN RIGHT KNEE: ICD-10-CM

## 2023-04-25 DIAGNOSIS — G89.29 PAIN IN RIGHT KNEE: ICD-10-CM

## 2023-04-25 DIAGNOSIS — M79.644 PAIN IN RIGHT FINGER(S): ICD-10-CM

## 2023-04-25 DIAGNOSIS — G89.29 PAIN IN RIGHT FINGER(S): ICD-10-CM

## 2023-04-25 DIAGNOSIS — U07.1 COVID-19: ICD-10-CM

## 2023-04-25 PROCEDURE — 99215 OFFICE O/P EST HI 40 MIN: CPT | Mod: 25

## 2023-04-25 PROCEDURE — 81003 URINALYSIS AUTO W/O SCOPE: CPT | Mod: QW

## 2023-04-25 PROCEDURE — 36415 COLL VENOUS BLD VENIPUNCTURE: CPT

## 2023-04-25 PROCEDURE — G2212 PROLONG OUTPT/OFFICE VIS: CPT

## 2023-04-25 RX ORDER — IBUPROFEN 200 MG/1
200 TABLET ORAL
Qty: 270 | Refills: 0 | Status: DISCONTINUED | COMMUNITY
End: 2023-04-25

## 2023-04-25 RX ORDER — RISEDRONATE SODIUM 150 MG/1
150 TABLET, FILM COATED ORAL
Qty: 3 | Refills: 0 | Status: DISCONTINUED | COMMUNITY
Start: 2023-01-31 | End: 2023-04-25

## 2023-05-22 PROBLEM — U07.1 COVID-19 VIRUS INFECTION: Status: RESOLVED | Noted: 2022-02-05 | Resolved: 2023-05-22

## 2023-05-22 LAB
ALBUMIN MFR SERPL ELPH: 57.3 %
ALBUMIN SERPL ELPH-MCNC: 4.6 G/DL
ALBUMIN SERPL-MCNC: 4.2 G/DL
ALBUMIN/GLOB SERPL: 1.4 RATIO
ALP BLD-CCNC: 96 U/L
ALPHA1 GLOB MFR SERPL ELPH: 5 %
ALPHA1 GLOB SERPL ELPH-MCNC: 0.4 G/DL
ALPHA2 GLOB MFR SERPL ELPH: 10.8 %
ALPHA2 GLOB SERPL ELPH-MCNC: 0.8 G/DL
ALT SERPL-CCNC: 17 U/L
ANION GAP SERPL CALC-SCNC: 13 MMOL/L
AST SERPL-CCNC: 17 U/L
B-GLOBULIN MFR SERPL ELPH: 10.9 %
B-GLOBULIN SERPL ELPH-MCNC: 0.8 G/DL
BASOPHILS # BLD AUTO: 0.1 K/UL
BASOPHILS NFR BLD AUTO: 0.8 %
BILIRUB SERPL-MCNC: 0.3 MG/DL
BILIRUB UR QL STRIP: NORMAL
BUN SERPL-MCNC: 18 MG/DL
CALCIUM SERPL-MCNC: 10 MG/DL
CHLORIDE SERPL-SCNC: 105 MMOL/L
CK SERPL-CCNC: 58 U/L
CLARITY UR: CLEAR
CO2 SERPL-SCNC: 22 MMOL/L
COLLECTION METHOD: NORMAL
CREAT SERPL-MCNC: 0.67 MG/DL
CRP SERPL-MCNC: 6 MG/L
DEPRECATED KAPPA LC FREE/LAMBDA SER: 1.08 RATIO
EGFR: 96 ML/MIN/1.73M2
ENA SS-A AB SER IA-ACNC: <0.2 AL
ENA SS-B AB SER IA-ACNC: <0.2 AL
EOSINOPHIL # BLD AUTO: 0.37 K/UL
EOSINOPHIL NFR BLD AUTO: 3 %
ERYTHROCYTE [SEDIMENTATION RATE] IN BLOOD BY WESTERGREN METHOD: 41 MM/HR
GAMMA GLOB FLD ELPH-MCNC: 1.2 G/DL
GAMMA GLOB MFR SERPL ELPH: 16 %
GLUCOSE SERPL-MCNC: 111 MG/DL
GLUCOSE UR-MCNC: NORMAL
HCG UR QL: 1 EU/DL
HCT VFR BLD CALC: 43.6 %
HGB BLD-MCNC: 14.1 G/DL
HGB UR QL STRIP.AUTO: NORMAL
IGA SER QL IEP: 162 MG/DL
IGG SER QL IEP: 1228 MG/DL
IGM SER QL IEP: 70 MG/DL
IMM GRANULOCYTES NFR BLD AUTO: 0.7 %
INTERPRETATION SERPL IEP-IMP: NORMAL
KAPPA LC CSF-MCNC: 1.71 MG/DL
KAPPA LC SERPL-MCNC: 1.84 MG/DL
KETONES UR-MCNC: NORMAL
LDH SERPL-CCNC: 157 U/L
LEUKOCYTE ESTERASE UR QL STRIP: NORMAL
LYMPHOCYTES # BLD AUTO: 2.16 K/UL
LYMPHOCYTES NFR BLD AUTO: 17.5 %
M PROTEIN SPEC IFE-MCNC: NORMAL
MAN DIFF?: NORMAL
MCHC RBC-ENTMCNC: 29.4 PG
MCHC RBC-ENTMCNC: 32.3 GM/DL
MCV RBC AUTO: 91 FL
MONOCYTES # BLD AUTO: 0.81 K/UL
MONOCYTES NFR BLD AUTO: 6.6 %
NEUTROPHILS # BLD AUTO: 8.78 K/UL
NEUTROPHILS NFR BLD AUTO: 71.4 %
NITRITE UR QL STRIP: NORMAL
PH UR STRIP: 5.5
PHOSPHATE SERPL-MCNC: 4.4 MG/DL
PLATELET # BLD AUTO: 400 K/UL
POTASSIUM SERPL-SCNC: 4.5 MMOL/L
PROT SERPL-MCNC: 7.3 G/DL
PROT UR STRIP-MCNC: NORMAL
RBC # BLD: 4.79 M/UL
RBC # FLD: 14.2 %
SODIUM SERPL-SCNC: 139 MMOL/L
SP GR UR STRIP: 1.02
WBC # FLD AUTO: 12.31 K/UL

## 2023-05-22 NOTE — ADDENDUM
[FreeTextEntry1] : I, Barron Sharma, acted solely as a scribe for Dr. Myron I. Kleiner, MD. on 04/25/2023.

## 2023-05-22 NOTE — PHYSICAL EXAM
[General Appearance - In No Acute Distress] : in no acute distress [General Appearance - Alert] : alert [General Appearance - Well Nourished] : well nourished [General Appearance - Well Developed] : well developed [General Appearance - Well-Appearing] : healthy appearing [Sclera] : the sclera and conjunctiva were normal [PERRL With Normal Accommodation] : pupils were equal in size, round, and reactive to light [Extraocular Movements] : extraocular movements were intact [Outer Ear] : the ears and nose were normal in appearance [Neck Appearance] : the appearance of the neck was normal [Neck Cervical Mass (___cm)] : no neck mass was observed [Jugular Venous Distention Increased] : there was no jugular-venous distention [Thyroid Diffuse Enlargement] : the thyroid was not enlarged [Thyroid Nodule] : there were no palpable thyroid nodules [Cervical Lymph Nodes Enlarged Posterior Bilaterally] : posterior cervical [Cervical Lymph Nodes Enlarged Anterior Bilaterally] : anterior cervical [Supraclavicular Lymph Nodes Enlarged Bilaterally] : supraclavicular [Axillary Lymph Nodes Enlarged Bilaterally] : axillary [No CVA Tenderness] : no ~M costovertebral angle tenderness [No Spinal Tenderness] : no spinal tenderness [Lungs Percussion] : the lungs were normal to percussion [Heart Rate And Rhythm] : heart rate was normal and rhythm regular [Edema] : there was no peripheral edema [Abdomen Soft] : soft [Abdomen Tenderness] : non-tender [Abdomen Mass (___ Cm)] : no abdominal mass palpated [Skin Color & Pigmentation] : normal skin color and pigmentation [Skin Turgor] : normal skin turgor [] : no rash [Cranial Nerves] : cranial nerves 2-12 were intact [Deep Tendon Reflexes (DTR)] : deep tendon reflexes were 2+ and symmetric [Sensation] : the sensory exam was normal to light touch and pinprick [No Focal Deficits] : no focal deficits [Motor Exam] : the motor exam was normal [Oriented To Time, Place, And Person] : oriented to person, place, and time [Impaired Insight] : insight and judgment were intact [Affect] : the affect was normal [Mood] : the mood was normal [FreeTextEntry1] : Strength-5/5

## 2023-05-22 NOTE — HISTORY OF PRESENT ILLNESS
[FreeTextEntry1] : ДМИТРИЙ EISENBERG is a 68 year old woman who presents for follow up office visit for further evaluation of joint symptoms and rheumatic diseases including osteoarthritis, fibromyalgia, Sjogren's syndrome and osteopenia.\par \par Patient feels fairly well. Intermittent pain base of both thumbs, both knees, and upper back. March 2023, PCP injected gel shots x3 left knee with no relief. She is considering seeing Orthopedics, Dr. Peterson. No recent low back pain. Some sleep disturbance and fatigue without snoring. No sleep study. No recent dry eyes and dry mouth. Tylenol p.r.n. Not taking ibuprofen. The patient continues calcium and vitamin D supplements. She did not start Risedronate secondary to fear of side effects. Patient denies rash or side effects with current medications. Patient is content with current medication regimen.\par \par PMH: COVID positive March 1, 2023-- not hospitalized\par \par SH: \par Terminated from job of 22 years\par June 2023, planned travel to [facility] to visit daughter in

## 2023-05-22 NOTE — ASSESSMENT
[FreeTextEntry1] : Impression: ДМИТРИЙ EISENBERG is a 68 year old woman who presents for follow up office visit for further evaluation of joint symptoms and rheumatic diseases including osteoarthritis, fibromyalgia, Sjogren's syndrome and osteoporosis with wedge compression T12.\par \par Patient feels fairly well. Intermittent pain base of both thumbs, both knees, and upper back secondary to her osteoarthritis. March 2023, PCP injected intra-articular viscosupplementation x3 left knee with no relief. She is considering seeing Orthopedics, Dr. Peterson. No recent low back pain. Some sleep disturbance and fatigue without snoring secondary to her fibromyalgia. No sleep study. Denies recent dry eyes and dry mouth, though on exam she has dry eyes - will continue to monitor for Sjogren's syndrome. Tylenol p.r.n. Not taking ibuprofen. Recent laboratory test results reveal no significant changes or results, with extensive discussion. The patient continues calcium and vitamin D supplements for her osteoporosis. She did not start Risedronate secondary to fear of side effects--she understands the need and the consequences.. Patient denies rash or side effects with current medications. Patient is content with current medication regimen.\par \par Plan: I reviewed the patient's chart and previous records \par I reviewed recent lab results with patient with extensive discussion\par I reviewed previous bone densitometry results including my analysis of the raw data with patient with extensive discussion \par Laboratory tests ordered - see list below - with coordination of care \par X-rays ordered – see list below – with coordination of care \par After x-rays, will consider MRI left knee\par Diagnosis and prognosis discussed\par Continue current medications (other than those changed below)\par Stay off ibuprofen\par Etodolac  mg b.i.d. end of breakfast and supper (Possible side effects explained including cardiovascular risk/MI/CVA)\par Prophylactic aspirin 81 mg q.d. at lunchtime (Possible side effects explained)\par Prolia 60 mg SQ q.6 months (Possible side effects explained including AVN of jaw)-- patient declined, she will think about it-- she understands the need and consequences\par Artificial tears one drop each eye q.i.d. and p.r.n.(Possible side effects explained)\par Biotene mouthwash/spray q.i.d. and p.r.n.(Possible side effects explained) \par Oral Hydration\par Patient declines oral medication for dryness \par Bilateral knee exercises--instruction sheet given and discussed--I demonstrated the exercise with extensive discussion\par Physical therapy with special attention both knees-- referral provided--with coordination of care\par Return visit 3 months\par Total time for this office visit, including face-to-face time and non-face-to-face time, 85 minutes--- including review of the chart and previous records, review of previous lab results with extensive discussion with the patient, ordering lab tests with coordination of care, review of recent imaging reports/x-ray results with extensive discussion with the patient, ordering of new x-rays with coordination of care, review of her bone densitometry results including my analysis of the raw data with extensive discussion, the exercises demonstrated for the patient with extensive discussion, detailed medication history, physical therapy referral with coordination of care, review of medications going forward with their possible side effects, reviewed the impact of the patient's rheumatic disease on their other medical problems, reviewed the impact of the patient's other medical problems on their rheumatic disease

## 2023-05-22 NOTE — REVIEW OF SYSTEMS
[Feeling Tired] : feeling tired [As Noted in HPI] : as noted in HPI [Joint Pain] : joint pain [Negative] : Heme/Lymph [Dry Eyes] : no dryness of the eyes

## 2023-05-22 NOTE — CONSULT LETTER
[Dear  ___] : Dear  [unfilled], [Consult Letter:] : I had the pleasure of evaluating your patient, [unfilled]. [Please see my note below.] : Please see my note below. [Consult Closing:] : Thank you very much for allowing me to participate in the care of this patient.  If you have any questions, please do not hesitate to contact me. [Sincerely,] : Sincerely, [FreeTextEntry3] : Zack\par Myron I. Kleiner, M.D., FACR\par Chief, Division of Rheumatology\par Department of Medicine\par [facility]

## 2023-07-12 ENCOUNTER — APPOINTMENT (OUTPATIENT)
Dept: ORTHOPEDIC SURGERY | Facility: CLINIC | Age: 68
End: 2023-07-12
Payer: MEDICARE

## 2023-07-12 VITALS
HEIGHT: 66 IN | SYSTOLIC BLOOD PRESSURE: 171 MMHG | HEART RATE: 65 BPM | DIASTOLIC BLOOD PRESSURE: 69 MMHG | BODY MASS INDEX: 27.32 KG/M2 | WEIGHT: 170 LBS

## 2023-07-12 PROCEDURE — 73564 X-RAY EXAM KNEE 4 OR MORE: CPT | Mod: LT

## 2023-07-12 PROCEDURE — 99204 OFFICE O/P NEW MOD 45 MIN: CPT

## 2023-07-12 NOTE — HISTORY OF PRESENT ILLNESS
[Pain Location] : pain [] : left knee [Worsening] : worsening [Standing] : standing [Constant] : ~He/She~ states the symptoms seem to be constant [Walking] : worsened by walking [NSAIDs] : relieved by nonsteroidal anti-inflammatory drugs [de-identified] : 68 year old female patient presents today for an initial consultation for left knee pain. Patient states the knee has started to bow more severely over the last year. T states that the pain is more anterior and radiates into her shin. She is unable to wear regular pants. The patient takes Meloxicam for the pain. She sees a rheumatologist. Has a history of viscosupplementation for her PCP that offered only slight relief as well as history of cortisone injection that did not help. She states the knee is stiff and maddy under her.  The patient has tried PT and states that only exacerbated the pain. She also has pain in her back (sciatica). No history of blood clots. No history of blood thinners.  Takes meloxicam without much relief.  Denies any falls or trauma.  Denies any history of surgery in the knee

## 2023-07-12 NOTE — ADDENDUM
[FreeTextEntry1] : This note was written by Pamella Hunter, acting as the  for Dr. Jackson. This note accurately reflects the work and decisions made by Dr. Jackson.

## 2023-07-12 NOTE — DISCUSSION/SUMMARY
[Other: ____] : in [unfilled] [Medication Risks Reviewed] : Medication risks reviewed [Surgical risks reviewed] : Surgical risks reviewed [de-identified] : 68 year old female patient presents today for an initial consultation for left knee pain. The patient can follow-up 1 week pre-operatively to answer any patient is a 68-year-old female with severe left knee osteoarthritis presenting today for initial evaluation.  She is undergone extensive conservative treatment in the past including directed physical therapy activity modification NSAID use injections including cortisone viscosupplementation has failed have relief of the pain in her knee.  She has a significant valgus deformity is having buckling episodes difficulty going down stairs rising reseated position going better activities of daily living.  Due to the fact that she has tried exhaust conservative treatment she wishes to proceed with total knee arthroplasty and I do think that is indicated.  We discussed risk benefits alternatives, limited blood loss infection damage neurovascular structures risk need for revision surgery risk of periprosthetic fracture.   Patient is in agreement and wished to proceed.  We discussed the increased risk of peroneal nerve palsy and valgus total knees.  I will see her back preoperatively for repeat evaluation.  We will obtain medical clearance.  We will obtain a CT scan for [place] planning.\par \par The patient is an 68 year old female who has bone on bone lateral arthritis of the left knee. Based upon the patients continued symptoms and failure to respond to conservative treatment I have recommended a left total knee replacement for the patient. A discussion was had with the patient regarding a left total knee replacement. A long discussion was had with the patient as what the total joint replacement would entail. A model was used to demonstrate the operation and to discuss bearing surfaces of the implants. The hospitalization and rehabilitation were discussed. The use of perioperative antibiotics and DVT prophylaxis were discussed. The risks, benefits and alternatives to surgical intervention were discussed at length with the patient. Specific risks discussed included: infection, wound breakdown, numbness and damage to nerves, tendon, muscle, arteries or other blood vessels. The possibility of recurrent pain, no improvement in pain and actual worsening of the pain were also mentioned in conversation with the patient. Medical complications related to the patient's general medical health including deep vein thrombosis, pulmonary embolus, heart attack, stroke, death and other complications from anesthesia were discussed as well. The patient was told that we will take steps to minimize these risks by using sterile technique, antibiotics and DVT prophylaxis when appropriate and following the patient postoperatively in the clinic setting to monitor progress. The benefits of surgery were discussed with the patient including the potential to improve the current clinical condition through operative intervention. Alternatives to surgical intervention include continued conservative management which may yield less than optimal results in this particular patient. All questions were answered to the satisfaction of the patient. We did discuss implant choices and fixation, with shared decision making with the patient. We discussed that the knee replacement will be done with robotic assistance to enhance accuracy and dynamic joint balancing.

## 2023-07-12 NOTE — PHYSICAL EXAM
[de-identified] : GENERAL APPEARANCE: Well nourished and hydrated, pleasant, alert, and oriented x 3. Appears their stated age. \par HEENT: Normocephalic, extraocular eye motion intact. Nasal septum midline. Oral cavity clear. External auditory canal clear. \par RESPIRATORY: Breath sounds clear and audible in all lobes. No wheezing, No accessory muscle use.\par CARDIOVASCULAR: No apparent abnormalities. No lower leg edema. No varicosities. Pedal pulses are palpable.\par NEUROLOGIC: Sensation is normal, no muscle weakness in the upper or lower extremities.\par DERMATOLOGIC: No apparent skin lesions, moist, warm, no rash.\par SPINE: Cervical spine appears normal and moves freely; thoracic spine appears normal and moves freely; lumbosacral spine appears normal and moves freely, normal, nontender.\par MUSCULOSKELETAL: Hands, wrists, and elbows are normal and move freely, shoulders are normal and move freely. \par PSYCHIATRIC: Oriented to person, place, and time, insight and judgement were intact and the affect was normal. [de-identified] : Left knee exam shows mild effusion, ROM is 0-120 degrees, no instability, no pain with Sena, lateral joint line tenderness. There is a 10-12 degree valgus deformity.\par 5/5 motor strength in bilateral lower extremities. Sensory: Intact in bilateral lower extremities. DTRs: Biceps, brachioradialis, triceps, patellar, ankle and plantar 2+ and symmetric bilaterally. Pulses: dorsalis pedis, posterior tibial, femoral, popliteal, and radial 2+ and symmetric bilaterally. [de-identified] : 4 views of the left knee obtained the office today show no acute fracture or dislocation.  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

## 2023-07-27 ENCOUNTER — APPOINTMENT (OUTPATIENT)
Dept: RHEUMATOLOGY | Facility: CLINIC | Age: 68
End: 2023-07-27

## 2023-08-12 LAB
ALBUMIN SERPL ELPH-MCNC: 4.4 G/DL
ALP BLD-CCNC: 96 U/L
ALT SERPL-CCNC: 20 U/L
ANION GAP SERPL CALC-SCNC: 11 MMOL/L
AST SERPL-CCNC: 24 U/L
BILIRUB SERPL-MCNC: 0.3 MG/DL
BUN SERPL-MCNC: 21 MG/DL
CALCIUM SERPL-MCNC: 9.7 MG/DL
CHLORIDE SERPL-SCNC: 104 MMOL/L
CO2 SERPL-SCNC: 24 MMOL/L
CREAT SERPL-MCNC: 0.74 MG/DL
EGFR: 89 ML/MIN/1.73M2
GLUCOSE SERPL-MCNC: 131 MG/DL
POTASSIUM SERPL-SCNC: 4 MMOL/L
PROT SERPL-MCNC: 6.9 G/DL
SODIUM SERPL-SCNC: 139 MMOL/L
T3 SERPL-MCNC: 115 NG/DL
T4 FREE SERPL-MCNC: 1.4 NG/DL
TSH SERPL-ACNC: 0.77 UIU/ML

## 2023-08-14 ENCOUNTER — APPOINTMENT (OUTPATIENT)
Dept: ENDOCRINOLOGY | Facility: CLINIC | Age: 68
End: 2023-08-14
Payer: MEDICARE

## 2023-08-14 DIAGNOSIS — E66.3 OVERWEIGHT: ICD-10-CM

## 2023-08-14 DIAGNOSIS — I10 ESSENTIAL (PRIMARY) HYPERTENSION: ICD-10-CM

## 2023-08-14 DIAGNOSIS — R73.9 HYPERGLYCEMIA, UNSPECIFIED: ICD-10-CM

## 2023-08-14 DIAGNOSIS — E04.2 NONTOXIC MULTINODULAR GOITER: ICD-10-CM

## 2023-08-14 PROCEDURE — 99214 OFFICE O/P EST MOD 30 MIN: CPT | Mod: 95

## 2023-08-14 NOTE — HISTORY OF PRESENT ILLNESS
[Home] : at home, [unfilled] , at the time of the visit. [Medical Office: ([facility])___] : at the medical office located in  [Verbal consent obtained from patient] : the patient, [unfilled] [FreeTextEntry1] : NTMNG  \par  onset 2016\par

## 2023-08-14 NOTE — ASSESSMENT
[FreeTextEntry1] : NTMNG  declines compressive sx  FNA MArch 2021 justynaing .  Thyroid US Sept 2022: all stable. Still present indeterminate LN on L side but stable. It was FNA with benign pt euthyroid clinically and biochemically.  hyperG ; check A1c   Overweight: gaiend 10 lns. Discussed diet and exercise encouraged more exercise walking 30 min 3 x week  HTN : I advised low fat/low cholesterol diet, low salt diet  osteopenia : DXA Jan 2021 FRAX score 18.7 / 1.3 %.  continue calcium and vitamin d   rtc in 1 yr   Verbal consent obtained from patient for telemedicine . Discussed with patient given unable to provide physical exam, evaluation done on symptoms only.  All lab results reviewed and discussed with patient. All questions answered This was a telehealth service rendered via interactive audio and video telecommunication  system.

## 2023-08-23 NOTE — REVIEW OF SYSTEMS
Patient informed and understood   [Feeling Tired] : feeling tired [Dry Eyes] : dryness of the eyes [As Noted in HPI] : as noted in HPI [Joint Pain] : joint pain [Negative] : Heme/Lymph

## 2023-09-01 ENCOUNTER — OUTPATIENT (OUTPATIENT)
Dept: OUTPATIENT SERVICES | Facility: HOSPITAL | Age: 68
LOS: 1 days | End: 2023-09-01

## 2023-09-01 ENCOUNTER — APPOINTMENT (OUTPATIENT)
Dept: ULTRASOUND IMAGING | Facility: CLINIC | Age: 68
End: 2023-09-01
Payer: COMMERCIAL

## 2023-09-01 DIAGNOSIS — Z00.8 ENCOUNTER FOR OTHER GENERAL EXAMINATION: ICD-10-CM

## 2023-09-01 PROCEDURE — 76536 US EXAM OF HEAD AND NECK: CPT | Mod: 26

## 2023-09-05 ENCOUNTER — NON-APPOINTMENT (OUTPATIENT)
Age: 68
End: 2023-09-05

## 2023-09-18 ENCOUNTER — APPOINTMENT (OUTPATIENT)
Dept: CT IMAGING | Facility: CLINIC | Age: 68
End: 2023-09-18
Payer: SELF-PAY

## 2023-09-18 ENCOUNTER — OUTPATIENT (OUTPATIENT)
Dept: OUTPATIENT SERVICES | Facility: HOSPITAL | Age: 68
LOS: 1 days | End: 2023-09-18
Payer: SELF-PAY

## 2023-09-18 ENCOUNTER — OUTPATIENT (OUTPATIENT)
Dept: OUTPATIENT SERVICES | Facility: HOSPITAL | Age: 68
LOS: 1 days | End: 2023-09-18
Payer: MEDICARE

## 2023-09-18 VITALS
TEMPERATURE: 98 F | WEIGHT: 170.64 LBS | SYSTOLIC BLOOD PRESSURE: 128 MMHG | OXYGEN SATURATION: 98 % | DIASTOLIC BLOOD PRESSURE: 76 MMHG | HEIGHT: 66 IN | HEART RATE: 61 BPM | RESPIRATION RATE: 18 BRPM

## 2023-09-18 DIAGNOSIS — Z98.890 OTHER SPECIFIED POSTPROCEDURAL STATES: Chronic | ICD-10-CM

## 2023-09-18 DIAGNOSIS — M17.12 UNILATERAL PRIMARY OSTEOARTHRITIS, LEFT KNEE: ICD-10-CM

## 2023-09-18 DIAGNOSIS — Z90.49 ACQUIRED ABSENCE OF OTHER SPECIFIED PARTS OF DIGESTIVE TRACT: Chronic | ICD-10-CM

## 2023-09-18 DIAGNOSIS — Z29.9 ENCOUNTER FOR PROPHYLACTIC MEASURES, UNSPECIFIED: ICD-10-CM

## 2023-09-18 DIAGNOSIS — Z98.51 TUBAL LIGATION STATUS: Chronic | ICD-10-CM

## 2023-09-18 DIAGNOSIS — Z01.818 ENCOUNTER FOR OTHER PREPROCEDURAL EXAMINATION: ICD-10-CM

## 2023-09-18 LAB
A1C WITH ESTIMATED AVERAGE GLUCOSE RESULT: 6.1 % — HIGH (ref 4–5.6)
ALBUMIN SERPL ELPH-MCNC: 4.4 G/DL — SIGNIFICANT CHANGE UP (ref 3.3–5.2)
ALP SERPL-CCNC: 105 U/L — SIGNIFICANT CHANGE UP (ref 40–120)
ALT FLD-CCNC: 19 U/L — SIGNIFICANT CHANGE UP
ANION GAP SERPL CALC-SCNC: 12 MMOL/L — SIGNIFICANT CHANGE UP (ref 5–17)
APTT BLD: 29.4 SEC — SIGNIFICANT CHANGE UP (ref 24.5–35.6)
AST SERPL-CCNC: 22 U/L — SIGNIFICANT CHANGE UP
BASOPHILS # BLD AUTO: 0.09 K/UL — SIGNIFICANT CHANGE UP (ref 0–0.2)
BASOPHILS NFR BLD AUTO: 0.8 % — SIGNIFICANT CHANGE UP (ref 0–2)
BILIRUB SERPL-MCNC: 0.3 MG/DL — LOW (ref 0.4–2)
BLD GP AB SCN SERPL QL: SIGNIFICANT CHANGE UP
BUN SERPL-MCNC: 12.9 MG/DL — SIGNIFICANT CHANGE UP (ref 8–20)
CALCIUM SERPL-MCNC: 9.8 MG/DL — SIGNIFICANT CHANGE UP (ref 8.4–10.5)
CHLORIDE SERPL-SCNC: 102 MMOL/L — SIGNIFICANT CHANGE UP (ref 96–108)
CO2 SERPL-SCNC: 25 MMOL/L — SIGNIFICANT CHANGE UP (ref 22–29)
CREAT SERPL-MCNC: 0.57 MG/DL — SIGNIFICANT CHANGE UP (ref 0.5–1.3)
EGFR: 100 ML/MIN/1.73M2 — SIGNIFICANT CHANGE UP
EOSINOPHIL # BLD AUTO: 0.21 K/UL — SIGNIFICANT CHANGE UP (ref 0–0.5)
EOSINOPHIL NFR BLD AUTO: 1.9 % — SIGNIFICANT CHANGE UP (ref 0–6)
ESTIMATED AVERAGE GLUCOSE: 128 MG/DL — HIGH (ref 68–114)
GLUCOSE SERPL-MCNC: 90 MG/DL — SIGNIFICANT CHANGE UP (ref 70–99)
HCT VFR BLD CALC: 41 % — SIGNIFICANT CHANGE UP (ref 34.5–45)
HGB BLD-MCNC: 13.7 G/DL — SIGNIFICANT CHANGE UP (ref 11.5–15.5)
IMM GRANULOCYTES NFR BLD AUTO: 0.5 % — SIGNIFICANT CHANGE UP (ref 0–0.9)
INR BLD: 0.93 RATIO — SIGNIFICANT CHANGE UP (ref 0.85–1.18)
LYMPHOCYTES # BLD AUTO: 2.35 K/UL — SIGNIFICANT CHANGE UP (ref 1–3.3)
LYMPHOCYTES # BLD AUTO: 21.2 % — SIGNIFICANT CHANGE UP (ref 13–44)
MCHC RBC-ENTMCNC: 29.5 PG — SIGNIFICANT CHANGE UP (ref 27–34)
MCHC RBC-ENTMCNC: 33.4 GM/DL — SIGNIFICANT CHANGE UP (ref 32–36)
MCV RBC AUTO: 88.2 FL — SIGNIFICANT CHANGE UP (ref 80–100)
MONOCYTES # BLD AUTO: 0.9 K/UL — SIGNIFICANT CHANGE UP (ref 0–0.9)
MONOCYTES NFR BLD AUTO: 8.1 % — SIGNIFICANT CHANGE UP (ref 2–14)
NEUTROPHILS # BLD AUTO: 7.48 K/UL — HIGH (ref 1.8–7.4)
NEUTROPHILS NFR BLD AUTO: 67.5 % — SIGNIFICANT CHANGE UP (ref 43–77)
PLATELET # BLD AUTO: 347 K/UL — SIGNIFICANT CHANGE UP (ref 150–400)
POTASSIUM SERPL-MCNC: 4.2 MMOL/L — SIGNIFICANT CHANGE UP (ref 3.5–5.3)
POTASSIUM SERPL-SCNC: 4.2 MMOL/L — SIGNIFICANT CHANGE UP (ref 3.5–5.3)
PROT SERPL-MCNC: 7.3 G/DL — SIGNIFICANT CHANGE UP (ref 6.6–8.7)
PROTHROM AB SERPL-ACNC: 10.3 SEC — SIGNIFICANT CHANGE UP (ref 9.5–13)
RBC # BLD: 4.65 M/UL — SIGNIFICANT CHANGE UP (ref 3.8–5.2)
RBC # FLD: 13.6 % — SIGNIFICANT CHANGE UP (ref 10.3–14.5)
SODIUM SERPL-SCNC: 139 MMOL/L — SIGNIFICANT CHANGE UP (ref 135–145)
WBC # BLD: 11.09 K/UL — HIGH (ref 3.8–10.5)
WBC # FLD AUTO: 11.09 K/UL — HIGH (ref 3.8–10.5)

## 2023-09-18 PROCEDURE — 73700 CT LOWER EXTREMITY W/O DYE: CPT

## 2023-09-18 PROCEDURE — 93010 ELECTROCARDIOGRAM REPORT: CPT

## 2023-09-18 PROCEDURE — 93005 ELECTROCARDIOGRAM TRACING: CPT

## 2023-09-18 PROCEDURE — 73700 CT LOWER EXTREMITY W/O DYE: CPT | Mod: 26,LT

## 2023-09-18 PROCEDURE — G0463: CPT

## 2023-09-18 RX ORDER — TRIMIPRAMINE MALEATE 100 MG
0 CAPSULE ORAL
Qty: 0 | Refills: 0 | DISCHARGE

## 2023-09-18 RX ORDER — SODIUM CHLORIDE 9 MG/ML
3 INJECTION INTRAMUSCULAR; INTRAVENOUS; SUBCUTANEOUS EVERY 8 HOURS
Refills: 0 | Status: DISCONTINUED | OUTPATIENT
Start: 2023-10-06 | End: 2023-10-07

## 2023-09-18 RX ORDER — METOPROLOL TARTRATE 50 MG
1 TABLET ORAL
Qty: 0 | Refills: 0 | DISCHARGE

## 2023-09-18 NOTE — H&P PST ADULT - NSICDXPASTSURGICALHX_GEN_ALL_CORE_FT
PAST SURGICAL HISTORY:  H/O oral surgery     H/O tubal ligation     History of cholecystectomy     S/P excision of lipoma

## 2023-09-18 NOTE — H&P PST ADULT - ENDOCRINE COMMENTS
thyroid nodules, follows with endocrinologist Dr Galloway; pre-diabetes thyroid nodules, follows with endocrinologist Dr Galloway; prediabetes

## 2023-09-18 NOTE — H&P PST ADULT - NS PRO FEM PAP NOT DONE 3 YRS
patient agrees to have a pap smear will follow after her knee surgery/patient agrees to have a pap smear

## 2023-09-18 NOTE — H&P PST ADULT - EKG AND INTERPRETATION
NSR, HR , official reading pending sinus bradycardia, HR 55, possible left atrial enlargement, official reading pending

## 2023-09-18 NOTE — H&P PST ADULT - MUSCULOSKELETAL COMMENTS
walks with antalgic gait on left side walks with antalgic gait on left side, valgus deformity of the left knee noted

## 2023-09-18 NOTE — H&P PST ADULT - ASSESSMENT
69 yo F PMH of HTN, presents with c/o left knee pain. Patient states her right knee has started to bow more severely over the last year. She takes Meloxicam for the pain. Had cortisone injection that did not help. She states the knee is stiff and maddy. She has tried PT and states that it only exacerbated her pain. She also c/o sciatica in her back. States the symptoms are worsening. Reports that her symptoms occur while walking and standing and seem to be constant. Modifying factors: worsened by walking and standing. Relieving factors include NSAIDs. Imagin views of the left knee from 2023 show severe lateral joint space narrowing bone-on-bone osteoarthritis. Preop assessment prior to Left TKR w/Dr Jackson scheduled for 10/6/2023     Patient was given information on joint class, joint book provided, ERP protocol reviewed with patient, MSSA/MRSA swabbed in PST, results pending     Patient was educated on preop preparation with written and verbal instructions. Pt was informed to obtain clearances >3 days before surgery. Pt was educated on NSAIDs, multivitamins and herbals that increase the risk of bleeding and need to be stopped 7 days before procedure. Pt verbalized understanding of the above.     OPIOID RISK TOOL    ANDREW EACH BOX THAT APPLIES AND ADD TOTALS AT THE END    FAMILY HISTORY OF SUBSTANCE ABUSE                 FEMALE         MALE                                                Alcohol                             [  ]1 pt          [  ]3pts                                               Illegal Durgs                     [  ]2 pts        [  ]3pts                                               Rx Drugs                           [  ]4 pts        [  ]4 pts    PERSONAL HISTORY OF SUBSTANCE ABUSE                                                                                          Alcohol                             [  ]3 pts       [  ]3 pts                                               Illegal Drugs                     [  ]4 pts        [  ]4 pts                                               Rx Drugs                           [  ]5 pts        [  ]5 pts    AGE BETWEEN 16-45 YEARS                                      [  ]1 pt         [  ]1 pt    HISTORY OF PREADOLESCENT   SEXUAL ABUSE                                                             [  ]3 pts        [  ]0pts    PSYCHOLOGICAL DISEASE                     ADD, OCD, Bipolar, Schizophrenia        [  ]2 pts         [  ]2 pts                      Depression                                               [  ]1 pt           [  ]1 pt           SCORING TOTAL   (add numbers and type here)              ( 0 )                                     A score of 3 or lower indicated LOW risk for future opioid abuse  A score of 4 to 7 indicated moderate risk for future opioid abuse  A score of 8 or higher indicates a high risk for opioid abuse    CAPRINI VTE 2.0 SCORE [CLOT updated 2019]    AGE RELATED RISK FACTORS                                                       MOBILITY RELATED FACTORS  [ ] Age 41-60 years                                            (1 Point)                    [ ] Bed rest                                                        (1 Point)  [ x] Age: 61-74 years                                           (2 Points)                  [ ] Plaster cast                                                   (2 Points)  [ ] Age= 75 years                                              (3 Points)                    [ ] Bed bound for more than 72 hours                 (2 Points)    DISEASE RELATED RISK FACTORS                                               GENDER SPECIFIC FACTORS  [ ] Edema in the lower extremities                       (1 Point)              [ ] Pregnancy                                                     (1 Point)  [ ] Varicose veins                                               (1 Point)                     [ ] Post-partum < 6 weeks                                   (1 Point)             [x ] BMI > 25 Kg/m2                                            (1 Point)                     [ ] Hormonal therapy  or oral contraception          (1 Point)                 [ ] Sepsis (in the previous month)                        (1 Point)               [ ] History of pregnancy complications                 (1 point)  [ ] Pneumonia or serious lung disease                                               [ ] Unexplained or recurrent                     (1 Point)           (in the previous month)                               (1 Point)  [ ] Abnormal pulmonary function test                     (1 Point)                 SURGERY RELATED RISK FACTORS  [ ] Acute myocardial infarction                              (1 Point)               [ ]  Section                                             (1 Point)  [ ] Congestive heart failure (in the previous month)  (1 Point)      [ ] Minor surgery                                                  (1 Point)   [ ] Inflammatory bowel disease                             (1 Point)               [ ] Arthroscopic surgery                                        (2 Points)  [ ] Central venous access                                      (2 Points)                [ ] General surgery lasting more than 45 minutes (2 points)  [ ] Malignancy- Present or previous                   (2 Points)                [ x] Elective arthroplasty                                         (5 points)    [ ] Stroke (in the previous month)                          (5 Points)                                                                                                                                                           HEMATOLOGY RELATED FACTORS                                                 TRAUMA RELATED RISK FACTORS  [ ] Prior episodes of VTE                                     (3 Points)                [ ] Fracture of the hip, pelvis, or leg                       (5 Points)  [ ] Positive family history for VTE                         (3 Points)             [ ] Acute spinal cord injury (in the previous month)  (5 Points)  [ ] Prothrombin 01676 A                                     (3 Points)               [ ] Paralysis  (less than 1 month)                             (5 Points)  [ ] Factor V Leiden                                             (3 Points)                  [ ] Multiple Trauma within 1 month                        (5 Points)  [ ] Lupus anticoagulants                                     (3 Points)                                                           [ ] Anticardiolipin antibodies                               (3 Points)                                                       [ ] High homocysteine in the blood                      (3 Points)                                             [ ] Other congenital or acquired thrombophilia      (3 Points)                                                [ ] Heparin induced thrombocytopenia                  (3 Points)                                     Total Score [    8      ] 69 yo F PMH of HTN, GERD, presents with c/o left knee pain. Patient states her right knee has started to bow more severely over the last year. She takes Meloxicam for the pain. Had cortisone injection that did not help. She states the knee is stiff and maddy. She has tried PT and states that it only exacerbated her pain. She also c/o sciatica in her back. States the symptoms are worsening. Reports that her symptoms occur while walking and standing and seem to be constant. Modifying factors: worsened by walking and standing. Relieving factors include NSAIDs. Imagin views of the left knee from 2023 show severe lateral joint space narrowing bone-on-bone osteoarthritis. Preop assessment prior to Left TKR w/Dr Jackson scheduled for 10/6/2023     Patient was given information on joint class, joint book provided, ERP protocol reviewed with patient, MSSA/MRSA swabbed in PST, results pending     Patient was educated on preop preparation with written and verbal instructions. Pt was informed to obtain clearances >3 days before surgery. Pt was educated on NSAIDs, multivitamins and herbals that increase the risk of bleeding and need to be stopped 7 days before procedure. Pt verbalized understanding of the above.     OPIOID RISK TOOL    ANDREW EACH BOX THAT APPLIES AND ADD TOTALS AT THE END    FAMILY HISTORY OF SUBSTANCE ABUSE                 FEMALE         MALE                                                Alcohol                             [  ]1 pt          [  ]3pts                                               Illegal Durgs                     [  ]2 pts        [  ]3pts                                               Rx Drugs                           [  ]4 pts        [  ]4 pts    PERSONAL HISTORY OF SUBSTANCE ABUSE                                                                                          Alcohol                             [  ]3 pts       [  ]3 pts                                               Illegal Drugs                     [  ]4 pts        [  ]4 pts                                               Rx Drugs                           [  ]5 pts        [  ]5 pts    AGE BETWEEN 16-45 YEARS                                      [  ]1 pt         [  ]1 pt    HISTORY OF PREADOLESCENT   SEXUAL ABUSE                                                             [  ]3 pts        [  ]0pts    PSYCHOLOGICAL DISEASE                     ADD, OCD, Bipolar, Schizophrenia        [  ]2 pts         [  ]2 pts                      Depression                                               [  ]1 pt           [  ]1 pt           SCORING TOTAL   (add numbers and type here)              ( 0 )                                     A score of 3 or lower indicated LOW risk for future opioid abuse  A score of 4 to 7 indicated moderate risk for future opioid abuse  A score of 8 or higher indicates a high risk for opioid abuse    CAPRINI VTE 2.0 SCORE [CLOT updated 2019]    AGE RELATED RISK FACTORS                                                       MOBILITY RELATED FACTORS  [ ] Age 41-60 years                                            (1 Point)                    [ ] Bed rest                                                        (1 Point)  [ x] Age: 61-74 years                                           (2 Points)                  [ ] Plaster cast                                                   (2 Points)  [ ] Age= 75 years                                              (3 Points)                    [ ] Bed bound for more than 72 hours                 (2 Points)    DISEASE RELATED RISK FACTORS                                               GENDER SPECIFIC FACTORS  [ ] Edema in the lower extremities                       (1 Point)              [ ] Pregnancy                                                     (1 Point)  [ ] Varicose veins                                               (1 Point)                     [ ] Post-partum < 6 weeks                                   (1 Point)             [x ] BMI > 25 Kg/m2                                            (1 Point)                     [ ] Hormonal therapy  or oral contraception          (1 Point)                 [ ] Sepsis (in the previous month)                        (1 Point)               [ ] History of pregnancy complications                 (1 point)  [ ] Pneumonia or serious lung disease                                               [ ] Unexplained or recurrent                     (1 Point)           (in the previous month)                               (1 Point)  [ ] Abnormal pulmonary function test                     (1 Point)                 SURGERY RELATED RISK FACTORS  [ ] Acute myocardial infarction                              (1 Point)               [ ]  Section                                             (1 Point)  [ ] Congestive heart failure (in the previous month)  (1 Point)      [ ] Minor surgery                                                  (1 Point)   [ ] Inflammatory bowel disease                             (1 Point)               [ ] Arthroscopic surgery                                        (2 Points)  [ ] Central venous access                                      (2 Points)                [ ] General surgery lasting more than 45 minutes (2 points)  [ ] Malignancy- Present or previous                   (2 Points)                [ x] Elective arthroplasty                                         (5 points)    [ ] Stroke (in the previous month)                          (5 Points)                                                                                                                                                           HEMATOLOGY RELATED FACTORS                                                 TRAUMA RELATED RISK FACTORS  [ ] Prior episodes of VTE                                     (3 Points)                [ ] Fracture of the hip, pelvis, or leg                       (5 Points)  [ ] Positive family history for VTE                         (3 Points)             [ ] Acute spinal cord injury (in the previous month)  (5 Points)  [ ] Prothrombin 67561 A                                     (3 Points)               [ ] Paralysis  (less than 1 month)                             (5 Points)  [ ] Factor V Leiden                                             (3 Points)                  [ ] Multiple Trauma within 1 month                        (5 Points)  [ ] Lupus anticoagulants                                     (3 Points)                                                           [ ] Anticardiolipin antibodies                               (3 Points)                                                       [ ] High homocysteine in the blood                      (3 Points)                                             [ ] Other congenital or acquired thrombophilia      (3 Points)                                                [ ] Heparin induced thrombocytopenia                  (3 Points)                                     Total Score [    8      ] 67 yo F PMH of HTN, GERD, pre-diabetes, presents with c/o left knee pain. Patient states her left knee is stiff and maddy. She had tried PT and states that it only exacerbated her pain. She also c/o sciatica in her back. She takes prn Meloxicam for the pain with temporary relief. Had cortisone injection that did not help. States her symptoms are worsening. Reports that her symptoms are worse with prolonged walking, standing and stairs. Relieving factors include rest and NSAIDs. Imagin views of the left knee from 2023 show severe lateral joint space narrowing bone-on-bone osteoarthritis. Preop assessment prior to Left TKR w/Dr Jackson scheduled for 10/6/2023      Patient was given information on joint class, joint book provided, ERP protocol reviewed with patient, MSSA/MRSA swabbed in PST, results pending     Patient was educated on preop preparation with written and verbal instructions. Pt was informed to obtain medical clearance >3 days before surgery. Pt was educated on NSAIDs, multivitamins and herbals that increase the risk of bleeding and need to be stopped 7 days before procedure. Pt verbalized understanding of the above.     OPIOID RISK TOOL    ANDREW EACH BOX THAT APPLIES AND ADD TOTALS AT THE END    FAMILY HISTORY OF SUBSTANCE ABUSE                 FEMALE         MALE                                                Alcohol                             [  ]1 pt          [  ]3pts                                               Illegal Drugs                     [  ]2 pts        [  ]3pts                                               Rx Drugs                           [  ]4 pts        [  ]4 pts    PERSONAL HISTORY OF SUBSTANCE ABUSE                                                                                          Alcohol                             [  ]3 pts       [  ]3 pts                                               Illegal Drugs                     [  ]4 pts        [  ]4 pts                                               Rx Drugs                           [  ]5 pts        [  ]5 pts    AGE BETWEEN 16-45 YEARS                                      [  ]1 pt         [  ]1 pt    HISTORY OF PREADOLESCENT   SEXUAL ABUSE                                                             [  ]3 pts        [  ]0pts    PSYCHOLOGICAL DISEASE                     ADD, OCD, Bipolar, Schizophrenia        [  ]2 pts         [  ]2 pts                      Depression                                               [  ]1 pt           [  ]1 pt           SCORING TOTAL   (add numbers and type here)              ( 0 )                                     A score of 3 or lower indicated LOW risk for future opioid abuse  A score of 4 to 7 indicated moderate risk for future opioid abuse  A score of 8 or higher indicates a high risk for opioid abuse    CAPRINI VTE 2.0 SCORE [CLOT updated 2019]    AGE RELATED RISK FACTORS                                                       MOBILITY RELATED FACTORS  [ ] Age 41-60 years                                            (1 Point)                    [ ] Bed rest                                                        (1 Point)  [ x] Age: 61-74 years                                           (2 Points)                  [ ] Plaster cast                                                   (2 Points)  [ ] Age= 75 years                                              (3 Points)                    [ ] Bed bound for more than 72 hours                 (2 Points)    DISEASE RELATED RISK FACTORS                                               GENDER SPECIFIC FACTORS  [ ] Edema in the lower extremities                       (1 Point)              [ ] Pregnancy                                                     (1 Point)  [ ] Varicose veins                                               (1 Point)                     [ ] Post-partum < 6 weeks                                   (1 Point)             [x ] BMI > 25 Kg/m2                                            (1 Point)                     [ ] Hormonal therapy  or oral contraception          (1 Point)                 [ ] Sepsis (in the previous month)                        (1 Point)               [ ] History of pregnancy complications                 (1 point)  [ ] Pneumonia or serious lung disease                                               [ ] Unexplained or recurrent                     (1 Point)           (in the previous month)                               (1 Point)  [ ] Abnormal pulmonary function test                     (1 Point)                 SURGERY RELATED RISK FACTORS  [ ] Acute myocardial infarction                              (1 Point)               [ ]  Section                                             (1 Point)  [ ] Congestive heart failure (in the previous month)  (1 Point)      [ ] Minor surgery                                                  (1 Point)   [ ] Inflammatory bowel disease                             (1 Point)               [ ] Arthroscopic surgery                                        (2 Points)  [ ] Central venous access                                      (2 Points)                [ ] General surgery lasting more than 45 minutes (2 points)  [ ] Malignancy- Present or previous                   (2 Points)                [ x] Elective arthroplasty                                         (5 points)    [ ] Stroke (in the previous month)                          (5 Points)                                                                                                                                                           HEMATOLOGY RELATED FACTORS                                                 TRAUMA RELATED RISK FACTORS  [ ] Prior episodes of VTE                                     (3 Points)                [ ] Fracture of the hip, pelvis, or leg                       (5 Points)  [ ] Positive family history for VTE                         (3 Points)             [ ] Acute spinal cord injury (in the previous month)  (5 Points)  [ ] Prothrombin 69721 A                                     (3 Points)               [ ] Paralysis  (less than 1 month)                             (5 Points)  [ ] Factor V Leiden                                             (3 Points)                  [ ] Multiple Trauma within 1 month                        (5 Points)  [ ] Lupus anticoagulants                                     (3 Points)                                                           [ ] Anticardiolipin antibodies                               (3 Points)                                                       [ ] High homocysteine in the blood                      (3 Points)                                             [ ] Other congenital or acquired thrombophilia      (3 Points)                                                [ ] Heparin induced thrombocytopenia                  (3 Points)                                     Total Score [    8      ]

## 2023-09-18 NOTE — H&P PST ADULT - NSANTHOSAYNRD_GEN_A_CORE
[Alert] : alert [Healthy Appearance] : healthy appearance [No Acute Distress] : no acute distress [Normal Sclera/Conjunctiva] : normal sclera/conjunctiva [Normal Hearing] : hearing was normal [No Neck Mass] : no neck mass was observed [No LAD] : no lymphadenopathy [Supple] : the neck was supple [Thyroid Not Enlarged] : the thyroid was not enlarged [No Respiratory Distress] : no respiratory distress [Clear to Auscultation] : lungs were clear to auscultation bilaterally [Normal S1, S2] : normal S1 and S2 [Normal Rate] : heart rate was normal [Regular Rhythm] : with a regular rhythm [No Stigmata of Cushings Syndrome] : no stigmata of Cushings Syndrome [Normal Gait] : normal gait [Acanthosis Nigricans] : acanthosis nigricans present [Right Foot Was Examined] : right foot ~C was examined [Left Foot Was Examined] : left foot ~C was examined [Normal] : normal [2+] : 2+ in the dorsalis pedis [Normal Insight/Judgement] : insight and judgment were intact [Kyphosis] : no kyphosis present [Diminished Throughout Both Feet] : normal tactile sensation with monofilament testing throughout both feet [de-identified] : no moon facies, no supraclavicular fat pads No. QUINTON screening performed.  STOP BANG Legend: 0-2 = LOW Risk; 3-4 = INTERMEDIATE Risk; 5-8 = HIGH Risk

## 2023-09-18 NOTE — H&P PST ADULT - NSICDXPASTMEDICALHX_GEN_ALL_CORE_FT
PAST MEDICAL HISTORY:  GERD (gastroesophageal reflux disease)     HTN (hypertension)     Unilateral primary osteoarthritis, left knee      PAST MEDICAL HISTORY:  GERD (gastroesophageal reflux disease)     HTN (hypertension)     Prediabetes     Thyroid nodule     Unilateral primary osteoarthritis, left knee

## 2023-09-18 NOTE — H&P PST ADULT - HISTORY OF PRESENT ILLNESS
67 yo F PMH of HTN, presents with c/o left knee pain. Patient states her right knee has started to bow more severely over the last year. She takes Meloxicam for the pain. Had cortisone injection that did not help. She states the knee is stiff and maddy. She has tried PT and states that it only exacerbated her pain. She also c/o sciatica in her back. States the symptoms are worsening. Reports that her symptoms occur while walking and standing and seem to be constant. Modifying factors: worsened by walking and standing. Relieving factors include NSAIDs. Imagin views of the left knee from 2023 show severe lateral joint space narrowing bone-on-bone osteoarthritis. Preop assessment prior to Left TKR w/Dr Jackson scheduled for 10/6/2023  69 yo F PMH of HTN, GERD, presents with c/o left knee pain. Patient states her right knee has started to bow more severely over the last year. She takes Meloxicam for the pain. Had cortisone injection that did not help. She states the knee is stiff and maddy. She has tried PT and states that it only exacerbated her pain. She also c/o sciatica in her back. States the symptoms are worsening. Reports that her symptoms occur while walking and standing and seem to be constant. Modifying factors: worsened by walking and standing. Relieving factors include NSAIDs. Imagin views of the left knee from 2023 show severe lateral joint space narrowing bone-on-bone osteoarthritis. Preop assessment prior to Left TKR w/Dr Jackson scheduled for 10/6/2023  67 yo F PMH of HTN, GERD, pre-diabetes, presents with c/o left knee pain. Patient states her left knee is stiff and maddy. She had tried PT and states that it only exacerbated her pain. She also c/o sciatica in her back. She takes prn Meloxicam for the pain with temporary relief. Had cortisone injection that did not help. States her symptoms are worsening. Reports that her symptoms are worse with prolonged walking, standing and stairs. Relieving factors include rest and NSAIDs. Imagin views of the left knee from 2023 show severe lateral joint space narrowing bone-on-bone osteoarthritis. Preop assessment prior to Left TKR w/Dr Jackson scheduled for 10/6/2023

## 2023-09-18 NOTE — H&P PST ADULT - NSICDXFAMILYHX_GEN_ALL_CORE_FT
FAMILY HISTORY:  FH: HTN (hypertension)    Sibling  Still living? Unknown  FH: Parkinson's disease, Age at diagnosis: Age Unknown     FAMILY HISTORY:  Mother  Still living? Unknown  FH: heart attack, Age at diagnosis: Age Unknown    Sibling  Still living? Unknown  FH: diabetes mellitus, Age at diagnosis: Age Unknown  FH: heart disease, Age at diagnosis: Age Unknown  FH: lung cancer, Age at diagnosis: Age Unknown  FH: Parkinson's disease, Age at diagnosis: Age Unknown

## 2023-09-18 NOTE — H&P PST ADULT - NEGATIVE ENMT SYMPTOMS
no hearing difficulty/no vertigo/no sinus symptoms/no post-nasal discharge/no nose bleeds/no gum bleeding/no dysphagia

## 2023-09-19 LAB
MRSA PCR RESULT.: SIGNIFICANT CHANGE UP
S AUREUS DNA NOSE QL NAA+PROBE: SIGNIFICANT CHANGE UP

## 2023-09-20 ENCOUNTER — NON-APPOINTMENT (OUTPATIENT)
Age: 68
End: 2023-09-20

## 2023-09-20 PROBLEM — M17.12 UNILATERAL PRIMARY OSTEOARTHRITIS, LEFT KNEE: Chronic | Status: ACTIVE | Noted: 2023-09-18

## 2023-09-20 PROBLEM — R73.03 PREDIABETES: Chronic | Status: ACTIVE | Noted: 2023-09-18

## 2023-09-20 PROBLEM — E04.1 NONTOXIC SINGLE THYROID NODULE: Chronic | Status: ACTIVE | Noted: 2023-09-18

## 2023-09-20 NOTE — H&P PST ADULT - BLOOD TRANSFUSION, PREVIOUS, PROFILE
Chief complaint: Patient presents with:  Toenail: Trimming      History of Present Illness: This 68 year old IDDM II male is seen at the request of No ref. provider found for evaluation and suggestions of management of thickened toenails. He has difficulty trimming his toenails.    He gets burning and itching sensation in his feet on occasion. His back has been giving him more pain recently.    He has not been applying lotion on his foot.    No further pedal complaints today.       Lab Results   Component Value Date    A1C 7.0 07/26/2023    A1C 7.4 06/19/2023    A1C 4.5 11/03/2022    A1C 5.4 07/29/2022    A1C 6.0 04/28/2022    A1C 10.1 04/28/2021    A1C 7.0 01/28/2021    A1C 8.3 10/22/2020    A1C 7.1 07/28/2020    A1C 5.7 11/20/2019           BP (!) 140/70 (BP Location: Left arm, Patient Position: Sitting, Cuff Size: Adult Regular)   Pulse 96   Temp 97.7  F (36.5  C) (Tympanic)   SpO2 92%     Patient Active Problem List   Diagnosis    Type 2 diabetes mellitus with diabetic peripheral angiopathy without gangrene, with long-term current use of insulin (H)    Chronic obstructive pulmonary disease, unspecified COPD type (H)    Hyperlipidemia LDL goal <100    Obesity (BMI 35.0-39.9) with comorbidity (H)    Mild recurrent major depression (H)    Restless legs syndrome    Aortic valve insufficiency, etiology of cardiac valve disease unspecified    PVD (peripheral vascular disease) (H)    Hypotestosteronism    Benign essential hypertension    Claudication (H)    Bacteremia due to Gram-positive bacteria    H/O aortic valve replacement    Pneumonia of left lower lobe due to methicillin-resistant Staphylococcus aureus (MRSA) (H)    Pneumoconiosis (H)    Obstructive sleep apnea syndrome    Acquired hypothyroidism       Past Surgical History:   Procedure Laterality Date    ANGIOGRAM  08/23/2022    AORTIC VALVE REPLACEMENT N/A 09/07/2022    CARPAL TUNNEL RELEASE RT/LT Left     masectomy Bilateral 11/2014    [place]        Current Outpatient Medications   Medication    albuterol (PROAIR HFA/PROVENTIL HFA/VENTOLIN HFA) 108 (90 Base) MCG/ACT inhaler    Ascorbic Acid (VITAMIN C PO)    ASPIRIN PO    blood glucose (ACCU-CHEK SMARTVIEW) test strip    blood glucose monitoring (ACCU-CHEK MULTICLIX) lancets    cholecalciferol (VITAMIN D3) 5000 units (125 mcg) capsule    cinnamon 500 MG TABS    desonide (DESOWEN) 0.05 % external ointment    Ferrous Sulfate (IRON) 325 (65 Fe) MG tablet    Fluticasone-Umeclidin-Vilanterol (TRELEGY ELLIPTA) 200-62.5-25 MCG/ACT oral inhaler    furosemide (LASIX) 40 MG tablet    insulin glargine (LANTUS SOLOSTAR) 100 UNIT/ML pen    insulin pen needle (32G X 4 MM) 32G X 4 MM miscellaneous    ipratropium - albuterol 0.5 mg/2.5 mg/3 mL (DUONEB) 0.5-2.5 (3) MG/3ML neb solution    levothyroxine (SYNTHROID/LEVOTHROID) 25 MCG tablet    Magnesium 125 MG CAPS    metFORMIN (GLUCOPHAGE) 500 MG tablet    Multiple Vitamins-Minerals (CENTRUM SILVER) per tablet    mupirocin (BACTROBAN) 2 % external ointment    omeprazole (PRILOSEC) 40 MG DR capsule    ondansetron (ZOFRAN ODT) 4 MG ODT tab    potassium chloride ER (KLOR-CON M) 20 MEQ CR tablet    pravastatin (PRAVACHOL) 40 MG tablet    rOPINIRole (REQUIP) 1 MG tablet    semaglutide (OZEMPIC, 0.25 OR 0.5 MG/DOSE,) 2 MG/1.5ML SOPN pen    sertraline (ZOLOFT) 100 MG tablet    zoledronic Acid (RECLAST) 5 MG/100ML SOLN infusion     No current facility-administered medications for this visit.          Allergies   Allergen Reactions    Fish Oil Shortness Of Breath    Keflex [Cephalexin]     Levaquin [Levofloxacin] Rash    Triple Antibiotic [Neomycin-Polymyxin-Dexameth] Rash       Family History   Problem Relation Age of Onset    Chronic Obstructive Pulmonary Disease Mother     Heart Disease Mother     Esophageal Cancer Mother     Chronic Obstructive Pulmonary Disease Father     Myocardial Infarction Father     Heart Disease Father        Social History     Socioeconomic History     Marital status:      Spouse name: None    Number of children: None    Years of education: None    Highest education level: None   Tobacco Use    Smoking status: Former     Types: Cigarettes     Quit date: 2012     Years since quittin.4    Smokeless tobacco: Never   Vaping Use    Vaping Use: Never used   Substance and Sexual Activity    Alcohol use: No    Drug use: No    Sexual activity: Yes     Partners: Female     Birth control/protection: None       ROS: 10 point ROS neg other than the symptoms noted above in the HPI.  EXAM  Constitutional: healthy, alert and no distress    Psychiatric: mentation appears normal and affect normal/bright    VASCULAR:  -Dorsalis pedis pulse +2/4 b/l  -Posterior tibial pulse +2/4 b/l  -Capillary refill time < 3 seconds to b/l hallux  -Hair growth Absent to b/l anterior legs and ankles  NEURO:  -Epicritic and protective sensation diminished to the bilateral foot.  DERM:  -Skin temperature, texture and turgor WNL b/l  -Diffusely dry skin of the bilateral foot  -Toenails elongated, thickened, dystrophic and discolored x 10  MSK:  -DORSIFLEXION contracture to MTPJ 2-5 b/l with flexion contracture to PIPJ of digits 2-5 b/l   -Muscle strength of ankles +5/5 for dorsiflexion, plantarflexion, ABDUction and ADDuction b/l  ============================================================    ASSESSMENT:  (L60.3) Onychodystrophy  (primary encounter diagnosis)    (E11.9,  Z79.4) Diabetes mellitus type 2, insulin dependent (H)    (E11.42) Diabetic polyneuropathy associated with type 2 diabetes mellitus (H)    (Z13.89) Screening for diabetic peripheral neuropathy      PLAN:  -Patient evaluated and examined. Treatment options discussed with no educational barriers noted.    -High risk toenail debridement x 10 toenails without incident    Diabetes Mellitus: Patient's DM is managed by their PCP. The DM appears to be stabl. Patient's last HbA1C was 7.0% on 2023.    -Diabetic Foot  Education provided. This included checking the feet daily looking for new new blisters or wounds, wearing shoes at all times when walking including around the house, and avoiding lotion application between the toes. If there are any signs of infection, the patient should present to the ED as soon as possible. Infections of the foot can be life threatening or lead to amputations of the foot or leg.    -Patient in agreement with the above treatment plan and all of patient's questions were answered.      Return to clinic 3 months for a diabetic foot exam and high risk nail debridement         Lucrecia Augustine DPM   no

## 2023-09-21 ENCOUNTER — NON-APPOINTMENT (OUTPATIENT)
Age: 68
End: 2023-09-21

## 2023-09-26 ENCOUNTER — APPOINTMENT (OUTPATIENT)
Dept: ORTHOPEDIC SURGERY | Facility: CLINIC | Age: 68
End: 2023-09-26
Payer: MEDICARE

## 2023-09-26 VITALS
HEIGHT: 66 IN | HEART RATE: 59 BPM | DIASTOLIC BLOOD PRESSURE: 76 MMHG | BODY MASS INDEX: 28.12 KG/M2 | WEIGHT: 175 LBS | SYSTOLIC BLOOD PRESSURE: 165 MMHG

## 2023-09-26 DIAGNOSIS — M17.12 UNILATERAL PRIMARY OSTEOARTHRITIS, LEFT KNEE: ICD-10-CM

## 2023-09-26 PROCEDURE — 99215 OFFICE O/P EST HI 40 MIN: CPT | Mod: 57

## 2023-10-05 ENCOUNTER — TRANSCRIPTION ENCOUNTER (OUTPATIENT)
Age: 68
End: 2023-10-05

## 2023-10-05 NOTE — PATIENT PROFILE ADULT - FALL HARM RISK - UNIVERSAL INTERVENTIONS
Bed in lowest position, wheels locked, appropriate side rails in place/Call bell, personal items and telephone in reach/Instruct patient to call for assistance before getting out of bed or chair/Non-slip footwear when patient is out of bed/[place] to call system/Physically safe environment - no spills, clutter or unnecessary equipment/Purposeful Proactive Rounding/Room/bathroom lighting operational, light cord in reach

## 2023-10-06 ENCOUNTER — INPATIENT (INPATIENT)
Facility: HOSPITAL | Age: 68
LOS: 0 days | Discharge: HOME HEALTH | DRG: 470 | End: 2023-10-07
Attending: STUDENT IN AN ORGANIZED HEALTH CARE EDUCATION/TRAINING PROGRAM | Admitting: STUDENT IN AN ORGANIZED HEALTH CARE EDUCATION/TRAINING PROGRAM
Payer: MEDICARE

## 2023-10-06 ENCOUNTER — APPOINTMENT (OUTPATIENT)
Dept: ORTHOPEDIC SURGERY | Facility: HOSPITAL | Age: 68
End: 2023-10-06

## 2023-10-06 ENCOUNTER — TRANSCRIPTION ENCOUNTER (OUTPATIENT)
Age: 68
End: 2023-10-06

## 2023-10-06 VITALS
HEART RATE: 68 BPM | TEMPERATURE: 98 F | RESPIRATION RATE: 18 BRPM | OXYGEN SATURATION: 97 % | WEIGHT: 170.64 LBS | SYSTOLIC BLOOD PRESSURE: 140 MMHG | HEIGHT: 66 IN | DIASTOLIC BLOOD PRESSURE: 52 MMHG

## 2023-10-06 DIAGNOSIS — Z98.890 OTHER SPECIFIED POSTPROCEDURAL STATES: Chronic | ICD-10-CM

## 2023-10-06 DIAGNOSIS — M17.12 UNILATERAL PRIMARY OSTEOARTHRITIS, LEFT KNEE: ICD-10-CM

## 2023-10-06 DIAGNOSIS — Z98.51 TUBAL LIGATION STATUS: Chronic | ICD-10-CM

## 2023-10-06 DIAGNOSIS — K21.9 GASTRO-ESOPHAGEAL REFLUX DISEASE WITHOUT ESOPHAGITIS: ICD-10-CM

## 2023-10-06 DIAGNOSIS — I10 ESSENTIAL (PRIMARY) HYPERTENSION: ICD-10-CM

## 2023-10-06 DIAGNOSIS — Z90.49 ACQUIRED ABSENCE OF OTHER SPECIFIED PARTS OF DIGESTIVE TRACT: Chronic | ICD-10-CM

## 2023-10-06 LAB — GLUCOSE BLDC GLUCOMTR-MCNC: 100 MG/DL — HIGH (ref 70–99)

## 2023-10-06 PROCEDURE — 27447 TOTAL KNEE ARTHROPLASTY: CPT | Mod: AS,LT

## 2023-10-06 PROCEDURE — 99222 1ST HOSP IP/OBS MODERATE 55: CPT

## 2023-10-06 PROCEDURE — 27447 TOTAL KNEE ARTHROPLASTY: CPT | Mod: LT

## 2023-10-06 PROCEDURE — 20985 CPTR-ASST DIR MS PX: CPT

## 2023-10-06 PROCEDURE — 73560 X-RAY EXAM OF KNEE 1 OR 2: CPT | Mod: 26,LT

## 2023-10-06 DEVICE — TRIATHLON TIBIAL COMP SZ 3: Type: IMPLANTABLE DEVICE | Site: LEFT | Status: FUNCTIONAL

## 2023-10-06 DEVICE — COMP FEM CR CMNTLSS BEADED W/ PA SZ 3 LT: Type: IMPLANTABLE DEVICE | Site: LEFT | Status: FUNCTIONAL

## 2023-10-06 DEVICE — PATELLA TRIATHLON ASSYM SZ A3X10MM: Type: IMPLANTABLE DEVICE | Site: LEFT | Status: FUNCTIONAL

## 2023-10-06 DEVICE — MAKO BONE PIN 4MM X 80MM: Type: IMPLANTABLE DEVICE | Site: LEFT | Status: FUNCTIONAL

## 2023-10-06 DEVICE — INSERT TIB BEARING TRIATHLON CS X3 SZ 3 9MM: Type: IMPLANTABLE DEVICE | Site: LEFT | Status: FUNCTIONAL

## 2023-10-06 DEVICE — MAKO BONE PIN 4MM X 140MM: Type: IMPLANTABLE DEVICE | Site: LEFT | Status: FUNCTIONAL

## 2023-10-06 DEVICE — ZIMMER FEMALE HEX SCREW MAGNETIC 2.5MM X 25MM: Type: IMPLANTABLE DEVICE | Site: LEFT | Status: FUNCTIONAL

## 2023-10-06 RX ORDER — ONDANSETRON 8 MG/1
4 TABLET, FILM COATED ORAL ONCE
Refills: 0 | Status: DISCONTINUED | OUTPATIENT
Start: 2023-10-06 | End: 2023-10-06

## 2023-10-06 RX ORDER — SODIUM CHLORIDE 9 MG/ML
1000 INJECTION INTRAMUSCULAR; INTRAVENOUS; SUBCUTANEOUS
Refills: 0 | Status: DISCONTINUED | OUTPATIENT
Start: 2023-10-06 | End: 2023-10-07

## 2023-10-06 RX ORDER — NEBIVOLOL HYDROCHLORIDE 5 MG/1
5 TABLET ORAL DAILY
Refills: 0 | Status: DISCONTINUED | OUTPATIENT
Start: 2023-10-06 | End: 2023-10-07

## 2023-10-06 RX ORDER — KETOROLAC TROMETHAMINE 30 MG/ML
15 SYRINGE (ML) INJECTION EVERY 6 HOURS
Refills: 0 | Status: DISCONTINUED | OUTPATIENT
Start: 2023-10-06 | End: 2023-10-07

## 2023-10-06 RX ORDER — PANTOPRAZOLE SODIUM 20 MG/1
40 TABLET, DELAYED RELEASE ORAL
Refills: 0 | Status: DISCONTINUED | OUTPATIENT
Start: 2023-10-06 | End: 2023-10-07

## 2023-10-06 RX ORDER — CELECOXIB 200 MG/1
400 CAPSULE ORAL ONCE
Refills: 0 | Status: COMPLETED | OUTPATIENT
Start: 2023-10-06 | End: 2023-10-06

## 2023-10-06 RX ORDER — MELOXICAM 15 MG/1
7.5 TABLET ORAL
Refills: 0 | Status: DISCONTINUED | OUTPATIENT
Start: 2023-10-08 | End: 2023-10-07

## 2023-10-06 RX ORDER — APREPITANT 80 MG/1
40 CAPSULE ORAL ONCE
Refills: 0 | Status: COMPLETED | OUTPATIENT
Start: 2023-10-06 | End: 2023-10-06

## 2023-10-06 RX ORDER — TRANEXAMIC ACID 100 MG/ML
1000 INJECTION, SOLUTION INTRAVENOUS ONCE
Refills: 0 | Status: DISCONTINUED | OUTPATIENT
Start: 2023-10-06 | End: 2023-10-06

## 2023-10-06 RX ORDER — ASPIRIN/CALCIUM CARB/MAGNESIUM 324 MG
81 TABLET ORAL
Refills: 0 | Status: DISCONTINUED | OUTPATIENT
Start: 2023-10-06 | End: 2023-10-07

## 2023-10-06 RX ORDER — OXYCODONE HYDROCHLORIDE 5 MG/1
10 TABLET ORAL
Refills: 0 | Status: DISCONTINUED | OUTPATIENT
Start: 2023-10-06 | End: 2023-10-07

## 2023-10-06 RX ORDER — FENTANYL CITRATE 50 UG/ML
25 INJECTION INTRAVENOUS
Refills: 0 | Status: DISCONTINUED | OUTPATIENT
Start: 2023-10-06 | End: 2023-10-06

## 2023-10-06 RX ORDER — CEFAZOLIN SODIUM 1 G
2000 VIAL (EA) INJECTION
Refills: 0 | Status: COMPLETED | OUTPATIENT
Start: 2023-10-06 | End: 2023-10-06

## 2023-10-06 RX ORDER — ACETAMINOPHEN 500 MG
1000 TABLET ORAL ONCE
Refills: 0 | Status: COMPLETED | OUTPATIENT
Start: 2023-10-06 | End: 2023-10-06

## 2023-10-06 RX ORDER — CEFAZOLIN SODIUM 1 G
2000 VIAL (EA) INJECTION ONCE
Refills: 0 | Status: DISCONTINUED | OUTPATIENT
Start: 2023-10-06 | End: 2023-10-06

## 2023-10-06 RX ORDER — ONDANSETRON 8 MG/1
4 TABLET, FILM COATED ORAL EVERY 6 HOURS
Refills: 0 | Status: DISCONTINUED | OUTPATIENT
Start: 2023-10-06 | End: 2023-10-07

## 2023-10-06 RX ORDER — SODIUM CHLORIDE 9 MG/ML
1000 INJECTION, SOLUTION INTRAVENOUS
Refills: 0 | Status: DISCONTINUED | OUTPATIENT
Start: 2023-10-06 | End: 2023-10-06

## 2023-10-06 RX ORDER — ACETAMINOPHEN 500 MG
975 TABLET ORAL ONCE
Refills: 0 | Status: COMPLETED | OUTPATIENT
Start: 2023-10-06 | End: 2023-10-06

## 2023-10-06 RX ORDER — HYDROMORPHONE HYDROCHLORIDE 2 MG/ML
0.5 INJECTION INTRAMUSCULAR; INTRAVENOUS; SUBCUTANEOUS
Refills: 0 | Status: DISCONTINUED | OUTPATIENT
Start: 2023-10-06 | End: 2023-10-06

## 2023-10-06 RX ORDER — SENNA PLUS 8.6 MG/1
2 TABLET ORAL AT BEDTIME
Refills: 0 | Status: DISCONTINUED | OUTPATIENT
Start: 2023-10-06 | End: 2023-10-07

## 2023-10-06 RX ORDER — ACETAMINOPHEN 500 MG
650 TABLET ORAL EVERY 6 HOURS
Refills: 0 | Status: DISCONTINUED | OUTPATIENT
Start: 2023-10-06 | End: 2023-10-07

## 2023-10-06 RX ORDER — OXYCODONE HYDROCHLORIDE 5 MG/1
5 TABLET ORAL
Refills: 0 | Status: DISCONTINUED | OUTPATIENT
Start: 2023-10-06 | End: 2023-10-07

## 2023-10-06 RX ORDER — MAGNESIUM HYDROXIDE 400 MG/1
30 TABLET, CHEWABLE ORAL DAILY
Refills: 0 | Status: DISCONTINUED | OUTPATIENT
Start: 2023-10-06 | End: 2023-10-07

## 2023-10-06 RX ORDER — POLYETHYLENE GLYCOL 3350 17 G/17G
17 POWDER, FOR SOLUTION ORAL AT BEDTIME
Refills: 0 | Status: DISCONTINUED | OUTPATIENT
Start: 2023-10-06 | End: 2023-10-07

## 2023-10-06 RX ORDER — SODIUM CHLORIDE 9 MG/ML
500 INJECTION INTRAMUSCULAR; INTRAVENOUS; SUBCUTANEOUS ONCE
Refills: 0 | Status: DISCONTINUED | OUTPATIENT
Start: 2023-10-06 | End: 2023-10-07

## 2023-10-06 RX ORDER — HYDROMORPHONE HYDROCHLORIDE 2 MG/ML
4 INJECTION INTRAMUSCULAR; INTRAVENOUS; SUBCUTANEOUS
Refills: 0 | Status: DISCONTINUED | OUTPATIENT
Start: 2023-10-06 | End: 2023-10-07

## 2023-10-06 RX ADMIN — Medication 400 MILLIGRAM(S): at 23:12

## 2023-10-06 RX ADMIN — Medication 650 MILLIGRAM(S): at 21:47

## 2023-10-06 RX ADMIN — FENTANYL CITRATE 25 MICROGRAM(S): 50 INJECTION INTRAVENOUS at 14:45

## 2023-10-06 RX ADMIN — Medication 650 MILLIGRAM(S): at 14:36

## 2023-10-06 RX ADMIN — Medication 1000 MILLIGRAM(S): at 23:16

## 2023-10-06 RX ADMIN — Medication 650 MILLIGRAM(S): at 18:06

## 2023-10-06 RX ADMIN — APREPITANT 40 MILLIGRAM(S): 80 CAPSULE ORAL at 07:25

## 2023-10-06 RX ADMIN — Medication 30 MILLILITER(S): at 23:25

## 2023-10-06 RX ADMIN — FENTANYL CITRATE 25 MICROGRAM(S): 50 INJECTION INTRAVENOUS at 14:35

## 2023-10-06 RX ADMIN — POLYETHYLENE GLYCOL 3350 17 GRAM(S): 17 POWDER, FOR SOLUTION ORAL at 21:51

## 2023-10-06 RX ADMIN — SODIUM CHLORIDE 100 MILLILITER(S): 9 INJECTION INTRAMUSCULAR; INTRAVENOUS; SUBCUTANEOUS at 17:17

## 2023-10-06 RX ADMIN — SENNA PLUS 2 TABLET(S): 8.6 TABLET ORAL at 21:51

## 2023-10-06 RX ADMIN — SODIUM CHLORIDE 100 MILLILITER(S): 9 INJECTION INTRAMUSCULAR; INTRAVENOUS; SUBCUTANEOUS at 23:12

## 2023-10-06 RX ADMIN — Medication 81 MILLIGRAM(S): at 17:18

## 2023-10-06 RX ADMIN — Medication 15 MILLIGRAM(S): at 23:16

## 2023-10-06 RX ADMIN — Medication 650 MILLIGRAM(S): at 21:54

## 2023-10-06 RX ADMIN — Medication 15 MILLIGRAM(S): at 17:17

## 2023-10-06 RX ADMIN — Medication 2000 MILLIGRAM(S): at 21:48

## 2023-10-06 RX ADMIN — Medication 15 MILLIGRAM(S): at 18:06

## 2023-10-06 RX ADMIN — Medication 30 MILLILITER(S): at 17:35

## 2023-10-06 RX ADMIN — Medication 2000 MILLIGRAM(S): at 14:49

## 2023-10-06 RX ADMIN — SODIUM CHLORIDE 3 MILLILITER(S): 9 INJECTION INTRAMUSCULAR; INTRAVENOUS; SUBCUTANEOUS at 21:43

## 2023-10-06 RX ADMIN — Medication 975 MILLIGRAM(S): at 07:25

## 2023-10-06 RX ADMIN — Medication 15 MILLIGRAM(S): at 23:13

## 2023-10-06 NOTE — DISCHARGE NOTE PROVIDER - HOSPITAL COURSE
The patient underwent a LeftTOTAL KNEE REPLACEMENT on 10/6/23. The patient received antibiotics consistent with SCIP guidelines. The patient underwent the procedure and had no intra-operative complications. Post-operatively, the patient was seen by medicine and PT. The patient received ASPIRIN for DVTP. The patient received pain medications per orthopedic pain management pathway and the pain was appropriately controlled. The patient did not have any post-operative medical complications. The patient was discharged in stable condition.

## 2023-10-06 NOTE — CONSULT NOTE ADULT - CONSULT REQUESTED DATE/TIME
[de-identified] : bilateral implants in good position, incisions well healed, NAC pink and viable. No infection, Left breast swelling improved,  slightly larger than right but much better symmetry and contour from last visit. IMF incisions B/L well healed. palpable nodule at the 12 o'clock position superior to the periareolar incision on left breast at the lumpectomy site - possible fat necriosis / scar tissue?
06-Oct-2023 12:45

## 2023-10-06 NOTE — CONSULT NOTE ADULT - ASSESSMENT
69 yo F PMH of HTN, GERD, pre-diabetes, presents with c/o left knee pain. Patient states her left knee is stiff and maddy. She had tried PT and states that it only exacerbated her pain. She also c/o sciatica in her back. She takes prn Meloxicam for the pain with temporary relief. Had cortisone injection that did not help. States her symptoms are worsening. Reports that her symptoms are worse with prolonged walking, standing and stairs. Relieving factors include rest and NSAIDs. Imagin views of the left knee from 2023 show severe lateral joint space narrowing bone-on-bone osteoarthritis. Now S/P  Left TKR w/Dr Jackson  10/6/2023.

## 2023-10-06 NOTE — PHYSICAL THERAPY INITIAL EVALUATION ADULT - RANGE OF MOTION EXAMINATION, REHAB EVAL
LLE: knee flexion limited to approximately 75 degrees, knee extension WFL, hip and ankle ROM WFL./Left UE ROM was WNL (within normal limits)/Right UE ROM was WNL (within normal limits)/Right LE ROM was WNL (within normal limits)

## 2023-10-06 NOTE — DISCHARGE NOTE PROVIDER - CARE PROVIDER_API CALL
Benedict Jackson  Orthopaedic Surgery  [address] [place] [id number]  Phone: [phone]  Fax: [phone]  Follow Up Time:

## 2023-10-06 NOTE — CONSULT NOTE ADULT - NS ATTEND AMEND GEN_ALL_CORE FT
Patient seen and examined , s/p L TKA , POD # 0 ,   tolerated procedure well .   Vital Signs Last 24 Hrs  T(C): 36.1 (10-06-23 @ 11:18), Max: 36.4 (10-06-23 @ 06:29)  T(F): 97 (10-06-23 @ 11:18), Max: 97.5 (10-06-23 @ 06:29)  HR: 76 (10-06-23 @ 12:45) (68 - 80)  BP: 146/81 (10-06-23 @ 12:45) (98/54 - 146/81)  BP(mean): 95 (10-06-23 @ 12:45) (59 - 102)  RR: 16 (10-06-23 @ 12:45) (11 - 19)  SpO2: 98% (10-06-23 @ 12:45) (97% - 100%)    Lungs: CTA B/L   CVS: S1S2 no rubs , no murmurs  L knee dry and clean dressing +     Above noted and reviewed with NP .     DVT prophylaxis  - as per ortho protocol  Opioid induced constipation  prophylaxis - bowel regimen     Thank you for the courtesy of this consult .   Will follow along with you . Patient seen and examined , s/p L TKA , POD # 0 ,   tolerated procedure well .   Vital Signs Last 24 Hrs  T(C): 36.1 (10-06-23 @ 11:18), Max: 36.4 (10-06-23 @ 06:29)  T(F): 97 (10-06-23 @ 11:18), Max: 97.5 (10-06-23 @ 06:29)  HR: 76 (10-06-23 @ 12:45) (68 - 80)  BP: 146/81 (10-06-23 @ 12:45) (98/54 - 146/81)  BP(mean): 95 (10-06-23 @ 12:45) (59 - 102)  RR: 16 (10-06-23 @ 12:45) (11 - 19)  SpO2: 98% (10-06-23 @ 12:45) (97% - 100%)    Lungs: CTA B/L   CVS: S1S2 no rubs , no murmurs  L knee dry and clean dressing +     Above noted and reviewed with NP .   Preoperative A1C noted - 6.1- Prediabetes : preop A1C  6.1 ,life style changes and need to follow up with PMD in 3 months to check A1C and follow further recommendations     Preoperative WBC noted 11.09- elevated , no S/S of infection - will recommend to repeat CBC I in 4-6 wks     DVT prophylaxis  - as per ortho protocol  Opioid induced constipation  prophylaxis - bowel regimen     Thank you for the courtesy of this consult .   Will follow along with you .

## 2023-10-06 NOTE — DISCHARGE NOTE PROVIDER - NSDCFUSCHEDAPPT_GEN_ALL_CORE_FT
Kleiner, Myron I  [facility]  [address]  Scheduled Appointment: 10/18/2023

## 2023-10-06 NOTE — DISCHARGE NOTE PROVIDER - NSDCFUADDINST_GEN_ALL_CORE_FT
The patient will be seen in the office between 2-3 weeks for wound check.   **Your first post-operative visit has been scheduled prior to your admission. PLEASE CONTACT OFFICE TO CONFIRM THE APPOINTMENT DATE. Tape will be removed at that time.  **  The silver based dressing is to be removed 7 days from the date of surgery.   ** CONTACT THE OFFICE IF THE FOLLOWING DEVELOP:  - the dressing becomes soiled or saturated  - you develop a fever greater that 101F  - the wound becomes red or you develop blistering around the wound  * Patient may shower after post-op day #3.   * The patient will continue home PT consistent with  total knee replacement protocol. Transition to outpatient PT will occur at the time of the first office visit.   * The patient will practice knee extension exercises regularly to minimize hamstring contraction.   * The patient is FULL weight bearing.  * The patient will continue ASPIRIN 81mg twice daily for 6 weeks after surgery for blood clot prevention.  . *** While on aspirin, the patient will take daily omeprazole or other similar medication to protect the stomach from irritation.   * The patient will take OXYCODONE AND TYLENOL for pain control and adjust according to prescription and patient needs. Contact the office if pain increases while taking prescribed pain medications or related concerns develop.  * Meloxicam 7.5 mg will be taken twice daily for 3 weeks for pain control and prevention of excessive bone growth. Additional prescription may be requested at your office follow-up visit.   * The patient will take Senna S while taking oxycodone to prevent narcotic associated constipation.  Additionally, increase water intake (drink at least 8 glasses of water daily) and try adding fiber to the diet by eating fruits, vegetables and foods that are rich in grains. If constipation is experienced, contact the medical/primary care provider to discuss further treatment options.  * To avoid injury at home:  - continue use of rolling walker until cleared by physical therapist  - have family or friend remove all throw rug or objects in hallways that may present a trip hazard.  - if you experience any dizziness or medical concerns, call your medical doctor or  911.  * The implant may activate metal detection devices.

## 2023-10-06 NOTE — DISCHARGE NOTE PROVIDER - NSDCMRMEDTOKEN_GEN_ALL_CORE_FT
Biotin:   Bystolic 5 mg oral tablet: 1 tab(s) orally once a day  Calcium 500 mg daily:   meloxicam 15 mg oral tablet: 1 tab(s) orally prn  NexIUM 40 mg oral powder for reconstitution, delayed release: 1 each orally once a day  Vitamin D3 1250 mcg (50,000 intl units) oral capsule: 1 cap(s) orally once a week  Zinc:    acetaminophen 325 mg oral tablet: 2 tab(s) orally every 6 hours  aspirin 81 mg oral delayed release tablet: 1 tab(s) orally 2 times a day  Biotin:   Bystolic 5 mg oral tablet: 1 tab(s) orally once a day  Calcium 500 mg daily:   meloxicam 7.5 mg oral tablet: 1 tab(s) orally 2 times a day  NexIUM 40 mg oral powder for reconstitution, delayed release: 1 each orally once a day  oxyCODONE 5 mg oral tablet: 1 tab(s) orally every 6 hours as needed for  moderate pain MDD: 4  Protonix 40 mg oral delayed release tablet: 1 tab(s) orally once a day  Senna S 50 mg-8.6 mg oral tablet: 2 tab(s) orally once a day (at bedtime)  Vitamin D3 1250 mcg (50,000 intl units) oral capsule: 1 cap(s) orally once a week  Zinc:

## 2023-10-06 NOTE — PHYSICAL THERAPY INITIAL EVALUATION ADULT - ADDITIONAL COMMENTS
Pt reports she was I in all ADLs, stairs, and ambulation with no assistive devices prior to surgery.  Pt lives in a house with 5 [place] with 1 left-to-ascend handrail, and one flight of steps down to the Laundry, which pt reports she will not be using upon return home.  Pt lives with her  who is retired and her adult son.   DME: pt owns a RW

## 2023-10-06 NOTE — CONSULT NOTE ADULT - SUBJECTIVE AND OBJECTIVE BOX
HPI:  67 yo F PMH of HTN, GERD, pre-diabetes, presents with c/o left knee pain. Patient states her left knee is stiff and maddy. She had tried PT and states that it only exacerbated her pain. She also c/o sciatica in her back. She takes prn Meloxicam for the pain with temporary relief. Had cortisone injection that did not help. States her symptoms are worsening. Reports that her symptoms are worse with prolonged walking, standing and stairs. Relieving factors include rest and NSAIDs. Imagin views of the left knee from 2023 show severe lateral joint space narrowing bone-on-bone osteoarthritis. Now S/P  Left TKR w/Dr Jackson  10/6/2023. Seen and examined in PACU. Denies chest pain, SOB, dizziness, nausea, vomiting, fever, chills.       PAST MEDICAL & SURGICAL HISTORY:  HTN (hypertension)      GERD (gastroesophageal reflux disease)      Unilateral primary osteoarthritis, left knee      Thyroid nodule      Prediabetes      H/O oral surgery      H/O tubal ligation      History of cholecystectomy      S/P excision of lipoma          MEDICATIONS  (STANDING):  aspirin enteric coated 81 milliGRAM(s) Oral two times a day  ceFAZolin  Injectable. 2000 milliGRAM(s) IV Push <User Schedule>  lactated ringers. 1000 milliLiter(s) (75 mL/Hr) IV Continuous <Continuous>  nebivolol 5 milliGRAM(s) Oral daily    MEDICATIONS  (PRN):  fentaNYL    Injectable 25 MICROGram(s) IV Push every 10 minutes PRN Moderate Pain (4 - 6)  HYDROmorphone  Injectable 0.5 milliGRAM(s) IV Push every 30 minutes PRN Moderate Pain (4 - 6)  ondansetron Injectable 4 milliGRAM(s) IV Push once PRN Nausea and/or Vomiting      Allergies    sulfa drugs (Hives)    Intolerances        SOCIAL HISTORY:  Former smoker  Social ETOH  Denies IVDA    FAMILY HISTORY:  FH: Parkinson's disease (Sibling)    FH: heart attack (Mother)    FH: diabetes mellitus (Sibling)    FH: heart disease (Sibling)    FH: lung cancer (Sibling)        Vital Signs Last 24 Hrs  T(C): 36.1 (06 Oct 2023 11:18), Max: 36.4 (06 Oct 2023 06:29)  T(F): 97 (06 Oct 2023 11:18), Max: 97.5 (06 Oct 2023 06:29)  HR: 73 (06 Oct 2023 12:30) (68 - 80)  BP: 129/85 (06 Oct 2023 12:30) (98/54 - 140/52)  BP(mean): 91 (06 Oct 2023 12:30) (59 - 102)  RR: 16 (06 Oct 2023 12:30) (11 - 19)  SpO2: 98% (06 Oct 2023 12:30) (97% - 100%)    Parameters below as of 06 Oct 2023 12:30  Patient On (Oxygen Delivery Method): room air      PHYSICAL EXAM:    General: Well developed;  in no acute distress  Eyes: PERRLA, EOMI; conjunctiva and sclera clear  Head: Normocephalic; atraumatic  ENMT: No nasal discharge; airway clear  Neck: Supple; non tender; no JVD  Respiratory: No wheezes, rales or rhonchi  Cardiovascular: Regular rate and rhythm. S1 and S2 Normal; No murmurs, gallops or rubs  Gastrointestinal: Soft non-tender non-distended; Normal bowel sounds  Extremities: Left ACE wrap in place C/D/I  Vascular: Peripheral pulses palpable 2+ bilaterally  Neurological: Alert and oriented x4  Skin: Warm and dry. No acute rash  Psychiatric: Cooperative and appropriate        LABS:                  RADIOLOGY & ADDITIONAL STUDIES: HPI:  67 yo F PMH of HTN, GERD, pre-diabetes, presents with c/o left knee pain. Patient states her left knee is stiff and maddy. She had tried PT and states that it only exacerbated her pain. She also c/o sciatica in her back. She takes prn Meloxicam for the pain with temporary relief. Had cortisone injection that did not help. States her symptoms are worsening. Reports that her symptoms are worse with prolonged walking, standing and stairs. Relieving factors include rest and NSAIDs. Imagin views of the left knee from 2023 show severe lateral joint space narrowing bone-on-bone osteoarthritis. Now S/P  Left TKR w/Dr Jackson  10/6/2023. Seen and examined in PACU. Denies chest pain, SOB, dizziness, nausea, vomiting, fever, chills.       PAST MEDICAL & SURGICAL HISTORY:  HTN (hypertension)      GERD (gastroesophageal reflux disease)      Unilateral primary osteoarthritis, left knee      Thyroid nodule      Prediabetes      H/O oral surgery      H/O tubal ligation      History of cholecystectomy      S/P excision of lipoma          MEDICATIONS  (STANDING):  aspirin enteric coated 81 milliGRAM(s) Oral two times a day  ceFAZolin  Injectable. 2000 milliGRAM(s) IV Push <User Schedule>  lactated ringers. 1000 milliLiter(s) (75 mL/Hr) IV Continuous <Continuous>  nebivolol 5 milliGRAM(s) Oral daily    MEDICATIONS  (PRN):  fentaNYL    Injectable 25 MICROGram(s) IV Push every 10 minutes PRN Moderate Pain (4 - 6)  HYDROmorphone  Injectable 0.5 milliGRAM(s) IV Push every 30 minutes PRN Moderate Pain (4 - 6)  ondansetron Injectable 4 milliGRAM(s) IV Push once PRN Nausea and/or Vomiting      Allergies    sulfa drugs (Hives)    Intolerances        SOCIAL HISTORY:  Former smoker  Social ETOH  Denies IVDA    FAMILY HISTORY:  FH: Parkinson's disease (Sibling)    FH: heart attack (Mother)    FH: diabetes mellitus (Sibling)    FH: heart disease (Sibling)    FH: lung cancer (Sibling)        Vital Signs Last 24 Hrs  T(C): 36.1 (06 Oct 2023 11:18), Max: 36.4 (06 Oct 2023 06:29)  T(F): 97 (06 Oct 2023 11:18), Max: 97.5 (06 Oct 2023 06:29)  HR: 73 (06 Oct 2023 12:30) (68 - 80)  BP: 129/85 (06 Oct 2023 12:30) (98/54 - 140/52)  BP(mean): 91 (06 Oct 2023 12:30) (59 - 102)  RR: 16 (06 Oct 2023 12:30) (11 - 19)  SpO2: 98% (06 Oct 2023 12:30) (97% - 100%)    Parameters below as of 06 Oct 2023 12:30  Patient On (Oxygen Delivery Method): room air      PHYSICAL EXAM:    General: Well developed;  in no acute distress  Eyes: PERRLA, EOMI; conjunctiva and sclera clear  Head: Normocephalic; atraumatic  ENMT: No nasal discharge; airway clear  Neck: Supple; non tender; no JVD  Respiratory: No wheezes, rales or rhonchi  Cardiovascular: Regular rate and rhythm. S1 and S2 Normal; No murmurs, gallops or rubs  Gastrointestinal: Soft non-tender non-distended; Normal bowel sounds  Extremities: Left ACE wrap in place C/D/I  Vascular: Peripheral pulses palpable 2+ bilaterally  Neurological: Alert and oriented x4  Skin: Warm and dry. No acute rash  Psychiatric: Cooperative and appropriate        LABS:  Comprehensive Metabolic Panel (23 @ 17:53)    Sodium: 139 mmol/L   Potassium: 4.2 mmol/L   Chloride: 102: Chloride reference range changed from ..10/26/2022 mmol/L   Carbon Dioxide: 25.0 mmol/L   Anion Gap: 12 mmol/L   Blood Urea Nitrogen: 12.9 mg/dL   Creatinine: 0.57 mg/dL   Glucose: 90 mg/dL   Calcium: 9.8 mg/dL   Protein Total: 7.3 g/dL   Albumin: 4.4 g/dL   Bilirubin Total: 0.3 mg/dL   Alkaline Phosphatase: 105 U/L   Aspartate Aminotransferase (AST/SGOT): 22 U/L   Alanine Aminotransferase (ALT/SGPT): 19 U/L   eGFR: 100    A1C with Estimated Average Glucose (23 @ 17:53)    A1C with Estimated Average Glucose Result: 6.1 %   Estimated Average Glucose: 128 mg/dL    Complete Blood Count + Automated Diff (23 @ 17:53)    WBC Count: 11.09 K/uL   RBC Count: 4.65 M/uL   Hemoglobin: 13.7 g/dL   Hematocrit: 41.0 %   Mean Cell Volume: 88.2 fl   Mean Cell Hemoglobin: 29.5 pg   Mean Cell Hemoglobin Conc: 33.4 gm/dL   Red Cell Distrib Width: 13.6 %   Platelet Count - Automated: 347 K/uL      RADIOLOGY & ADDITIONAL STUDIES:

## 2023-10-07 ENCOUNTER — TRANSCRIPTION ENCOUNTER (OUTPATIENT)
Age: 68
End: 2023-10-07

## 2023-10-07 VITALS
RESPIRATION RATE: 18 BRPM | HEART RATE: 78 BPM | OXYGEN SATURATION: 96 % | DIASTOLIC BLOOD PRESSURE: 70 MMHG | SYSTOLIC BLOOD PRESSURE: 164 MMHG | TEMPERATURE: 98 F

## 2023-10-07 PROCEDURE — 97116 GAIT TRAINING THERAPY: CPT

## 2023-10-07 PROCEDURE — 97110 THERAPEUTIC EXERCISES: CPT

## 2023-10-07 PROCEDURE — S2900: CPT

## 2023-10-07 PROCEDURE — C1776: CPT

## 2023-10-07 PROCEDURE — 99232 SBSQ HOSP IP/OBS MODERATE 35: CPT

## 2023-10-07 PROCEDURE — 82962 GLUCOSE BLOOD TEST: CPT

## 2023-10-07 PROCEDURE — 36415 COLL VENOUS BLD VENIPUNCTURE: CPT

## 2023-10-07 PROCEDURE — C1713: CPT

## 2023-10-07 PROCEDURE — 73560 X-RAY EXAM OF KNEE 1 OR 2: CPT

## 2023-10-07 PROCEDURE — 97163 PT EVAL HIGH COMPLEX 45 MIN: CPT

## 2023-10-07 RX ORDER — PANTOPRAZOLE SODIUM 20 MG/1
1 TABLET, DELAYED RELEASE ORAL
Qty: 30 | Refills: 1
Start: 2023-10-07 | End: 2023-12-05

## 2023-10-07 RX ORDER — OXYCODONE HYDROCHLORIDE 5 MG/1
1 TABLET ORAL
Qty: 28 | Refills: 0
Start: 2023-10-07 | End: 2023-10-13

## 2023-10-07 RX ORDER — ACETAMINOPHEN 500 MG
2 TABLET ORAL
Qty: 0 | Refills: 0 | DISCHARGE
Start: 2023-10-07

## 2023-10-07 RX ORDER — SENNOSIDES/DOCUSATE SODIUM 8.6MG-50MG
2 TABLET ORAL
Qty: 10 | Refills: 0
Start: 2023-10-07 | End: 2023-10-11

## 2023-10-07 RX ORDER — MELOXICAM 15 MG/1
1 TABLET ORAL
Qty: 42 | Refills: 0
Start: 2023-10-07 | End: 2023-10-27

## 2023-10-07 RX ORDER — ASPIRIN/CALCIUM CARB/MAGNESIUM 324 MG
1 TABLET ORAL
Qty: 84 | Refills: 0
Start: 2023-10-07 | End: 2023-11-17

## 2023-10-07 RX ORDER — MELOXICAM 15 MG/1
1 TABLET ORAL
Refills: 0 | DISCHARGE

## 2023-10-07 RX ADMIN — SODIUM CHLORIDE 3 MILLILITER(S): 9 INJECTION INTRAMUSCULAR; INTRAVENOUS; SUBCUTANEOUS at 04:25

## 2023-10-07 RX ADMIN — OXYCODONE HYDROCHLORIDE 10 MILLIGRAM(S): 5 TABLET ORAL at 04:33

## 2023-10-07 RX ADMIN — Medication 15 MILLIGRAM(S): at 04:25

## 2023-10-07 RX ADMIN — Medication 650 MILLIGRAM(S): at 11:06

## 2023-10-07 RX ADMIN — OXYCODONE HYDROCHLORIDE 10 MILLIGRAM(S): 5 TABLET ORAL at 03:33

## 2023-10-07 RX ADMIN — Medication 15 MILLIGRAM(S): at 12:06

## 2023-10-07 RX ADMIN — Medication 15 MILLIGRAM(S): at 11:05

## 2023-10-07 RX ADMIN — Medication 15 MILLIGRAM(S): at 04:22

## 2023-10-07 RX ADMIN — Medication 650 MILLIGRAM(S): at 12:06

## 2023-10-07 RX ADMIN — PANTOPRAZOLE SODIUM 40 MILLIGRAM(S): 20 TABLET, DELAYED RELEASE ORAL at 04:22

## 2023-10-07 RX ADMIN — OXYCODONE HYDROCHLORIDE 5 MILLIGRAM(S): 5 TABLET ORAL at 12:05

## 2023-10-07 RX ADMIN — NEBIVOLOL HYDROCHLORIDE 5 MILLIGRAM(S): 5 TABLET ORAL at 11:05

## 2023-10-07 RX ADMIN — Medication 81 MILLIGRAM(S): at 04:22

## 2023-10-07 RX ADMIN — Medication 650 MILLIGRAM(S): at 04:22

## 2023-10-07 RX ADMIN — OXYCODONE HYDROCHLORIDE 5 MILLIGRAM(S): 5 TABLET ORAL at 11:05

## 2023-10-07 RX ADMIN — Medication 650 MILLIGRAM(S): at 04:25

## 2023-10-07 NOTE — PROGRESS NOTE ADULT - SUBJECTIVE AND OBJECTIVE BOX
Ortho Post Op Check    Name: ДМИТРИЙ EISENBERG    MR #: [id number]    Procedure: Left total knee arthroplasty   Surgeon: Dr Jackson    Pt comfortable without complaints, pain controlled. Ambulated in PACU with PT. Did well. No dizziness.  Denies CP, SOB, N/V, numbness/tingling     MEDICATIONS  (STANDING):  acetaminophen     Tablet .. 650 milliGRAM(s) Oral every 6 hours  acetaminophen   IVPB .. 1000 milliGRAM(s) IV Intermittent once  aspirin enteric coated 81 milliGRAM(s) Oral two times a day  ceFAZolin  Injectable. 2000 milliGRAM(s) IV Push <User Schedule>  ketorolac   Injectable 15 milliGRAM(s) IV Push every 6 hours  lactated ringers. 1000 milliLiter(s) (75 mL/Hr) IV Continuous <Continuous>  nebivolol 5 milliGRAM(s) Oral daily  pantoprazole    Tablet 40 milliGRAM(s) Oral before breakfast  polyethylene glycol 3350 17 Gram(s) Oral at bedtime  senna 2 Tablet(s) Oral at bedtime  sodium chloride 0.9% Bolus 500 milliLiter(s) IV Bolus once  sodium chloride 0.9% lock flush 3 milliLiter(s) IV Push every 8 hours  sodium chloride 0.9%. 1000 milliLiter(s) (100 mL/Hr) IV Continuous <Continuous>    MEDICATIONS  (PRN):  aluminum hydroxide/magnesium hydroxide/simethicone Suspension 30 milliLiter(s) Oral four times a day PRN Indigestion  fentaNYL    Injectable 25 MICROGram(s) IV Push every 10 minutes PRN Moderate Pain (4 - 6)  HYDROmorphone   Tablet 4 milliGRAM(s) Oral every 3 hours PRN Severe Pain (7 - 10)  HYDROmorphone  Injectable 0.5 milliGRAM(s) IV Push every 30 minutes PRN Moderate Pain (4 - 6)  magnesium hydroxide Suspension 30 milliLiter(s) Oral daily PRN Constipation  ondansetron Injectable 4 milliGRAM(s) IV Push every 6 hours PRN Nausea and/or Vomiting  ondansetron Injectable 4 milliGRAM(s) IV Push once PRN Nausea and/or Vomiting  oxyCODONE    IR 5 milliGRAM(s) Oral every 3 hours PRN Mild Pain (1 - 3)  oxyCODONE    IR 10 milliGRAM(s) Oral every 3 hours PRN Moderate Pain (4 - 6)      General Exam:  Vital Signs Last 24 Hrs  T(C): 36.7 (10-06-23 @ 14:45), Max: 36.7 (10-06-23 @ 14:45)  T(F): 98 (10-06-23 @ 14:45), Max: 98 (10-06-23 @ 14:45)  HR: 72 (10-06-23 @ 14:45) (70 - 80)  BP: 149/68 (10-06-23 @ 14:15) (98/54 - 149/68)  BP(mean): 86 (10-06-23 @ 14:15) (59 - 102)  RR: 16 (10-06-23 @ 14:45) (11 - 20)  SpO2: 99% (10-06-23 @ 14:45) (95% - 100%)    General: Pt Alert and oriented, NAD, controlled pain.  Dressings C/D/I. No bleeding.  Pulses: 2+ dorsalis pedis pulse. Cap refill < 2 sec.  Sensation: Grossly intact to light touch without deficit.  Motor: + EHL/FHL/TA/GS    Post-op X-Ray: prosthesis in place. No fx/dislocation    A/P: 68yFemale POD#0 s/p Left total knee arthroplasty   - Stable  - Pain Control  - DVT ppx: SCDs/ASA  - Post op abx: ancef  - PT/OT  - Weight bearing status: WBAT  - Medicine following
ДМИТРИЙ EISENBERG  [id number]    History: 68y Female is status post left total knee arthroplasty on 10/6, POD#1.  Patient is doing well and is comfortable. Sitting in bedside chair having breakfast. The patient's pain is controlled using the prescribed pain medications. The patient is participating in physical therapy. Denies nausea, vomiting, chest pain, shortness of breath, abdominal pain or fever. No new complaints.      MEDICATIONS  (STANDING):  acetaminophen     Tablet .. 650 milliGRAM(s) Oral every 6 hours  aspirin enteric coated 81 milliGRAM(s) Oral two times a day  ketorolac   Injectable 15 milliGRAM(s) IV Push every 6 hours  nebivolol 5 milliGRAM(s) Oral daily  pantoprazole    Tablet 40 milliGRAM(s) Oral before breakfast  polyethylene glycol 3350 17 Gram(s) Oral at bedtime  senna 2 Tablet(s) Oral at bedtime  sodium chloride 0.9% Bolus 500 milliLiter(s) IV Bolus once  sodium chloride 0.9% lock flush 3 milliLiter(s) IV Push every 8 hours  sodium chloride 0.9%. 1000 milliLiter(s) (100 mL/Hr) IV Continuous <Continuous>    MEDICATIONS  (PRN):  aluminum hydroxide/magnesium hydroxide/simethicone Suspension 30 milliLiter(s) Oral four times a day PRN Indigestion  HYDROmorphone   Tablet 4 milliGRAM(s) Oral every 3 hours PRN Severe Pain (7 - 10)  magnesium hydroxide Suspension 30 milliLiter(s) Oral daily PRN Constipation  ondansetron Injectable 4 milliGRAM(s) IV Push every 6 hours PRN Nausea and/or Vomiting  oxyCODONE    IR 5 milliGRAM(s) Oral every 3 hours PRN Mild Pain (1 - 3)  oxyCODONE    IR 10 milliGRAM(s) Oral every 3 hours PRN Moderate Pain (4 - 6)      Physical exam: The left knee dressing remains clean, dry and intact. No drainage or discharge.  The calf is supple nontender. Passive range of motion is acceptable to due postoperative pain. No calf tenderness. Sensation to light touch is grossly intact distally. Motor function distally is 5/5. Extensor hallucis longus and flexor hallucis longus are intact. No foot drop. 2+ dorsalis pedis pulse. Capillary refill is less than 2 seconds. No cyanosis.    Primary Orthopedic Assessment:  •	s/p LEFT total knee replacement    Secondary  Orthopedic Assessment(s):   •	    Secondary  Medical Assessment(s):   •	    Plan:   •	DVT prophylaxis as prescribed, including use of compression devices and ankle pumps  •	Continue physical therapy  •	Weightbearing as tolerated of the Left  lower extremity with assistance of a walker  •	Incentive spirometry encouraged  •	Pain control as clinically indicated  •	Discharge planning – anticipated discharge is Home 
ДМИТРИЙ EISENBERG    [id number]    68y      Female    Patient is a 68y old  Female who presents with a chief complaint of Left TKA (07 Oct 2023 08:02)      INTERVAL HPI/OVERNIGHT EVENTS:    Patient is doing ok, pain is well managed with pain medications, working well with PT, has no fever, chills, chest pain, SOB, nausea, vomiting, constipation.     Vital Signs Last 24 Hrs  T(C): 36.7 (07 Oct 2023 09:33), Max: 36.7 (06 Oct 2023 14:45)  T(F): 98 (07 Oct 2023 09:33), Max: 98.1 (06 Oct 2023 23:43)  HR: 78 (07 Oct 2023 09:33) (70 - 81)  BP: 164/70 (07 Oct 2023 09:33) (98/54 - 164/70)  BP(mean): 86 (06 Oct 2023 14:15) (59 - 102)  RR: 18 (07 Oct 2023 09:33) (11 - 20)  SpO2: 96% (07 Oct 2023 09:33) (92% - 100%)    Parameters below as of 07 Oct 2023 09:33  Patient On (Oxygen Delivery Method): room air        PHYSICAL EXAM:    GENERAL: Elderly male looking   HEENT: PERRL, +EOMI  NECK: soft, Supple, No JVD  CHEST/LUNG: Clear to auscultate bilaterally; No wheezing  HEART: S1S2+, Regular rate and rhythm; No murmurs  ABDOMEN: Soft, Nontender, Nondistended; Bowel sounds present  EXTREMITIES:  1+ Peripheral Pulses, No edema  SKIN: No rashes or lesions  NEURO: AAOX3  PSYCH: normal mood        I&O's Summary    06 Oct 2023 07:01  -  07 Oct 2023 07:00  --------------------------------------------------------  IN: 1200 mL / OUT: 1250 mL / NET: -50 mL        MEDICATIONS  (STANDING):  acetaminophen     Tablet .. 650 milliGRAM(s) Oral every 6 hours  aspirin enteric coated 81 milliGRAM(s) Oral two times a day  ketorolac   Injectable 15 milliGRAM(s) IV Push every 6 hours  nebivolol 5 milliGRAM(s) Oral daily  pantoprazole    Tablet 40 milliGRAM(s) Oral before breakfast  polyethylene glycol 3350 17 Gram(s) Oral at bedtime  senna 2 Tablet(s) Oral at bedtime  sodium chloride 0.9% Bolus 500 milliLiter(s) IV Bolus once  sodium chloride 0.9% lock flush 3 milliLiter(s) IV Push every 8 hours  sodium chloride 0.9%. 1000 milliLiter(s) (100 mL/Hr) IV Continuous <Continuous>    MEDICATIONS  (PRN):  aluminum hydroxide/magnesium hydroxide/simethicone Suspension 30 milliLiter(s) Oral four times a day PRN Indigestion  HYDROmorphone   Tablet 4 milliGRAM(s) Oral every 3 hours PRN Severe Pain (7 - 10)  magnesium hydroxide Suspension 30 milliLiter(s) Oral daily PRN Constipation  ondansetron Injectable 4 milliGRAM(s) IV Push every 6 hours PRN Nausea and/or Vomiting  oxyCODONE    IR 5 milliGRAM(s) Oral every 3 hours PRN Mild Pain (1 - 3)  oxyCODONE    IR 10 milliGRAM(s) Oral every 3 hours PRN Moderate Pain (4 - 6)

## 2023-10-07 NOTE — PROGRESS NOTE ADULT - ASSESSMENT
67 yo F with Hx of HTN, GERD, pre-diabetes, she has left knee pain, She had tried PT and states that it only exacerbated her pain, Had cortisone injection that did not help, came in here for elective Left TKR w/Dr Jackson on 10/6/2023 s/p procedure.        Unilateral primary osteoarthritis, left knee S/P Left TKR post op day 01:  Post op ABX as per SCIP  PT/Pain control/DVT ppx as per Ortho  IS  IVF.    GERD (gastroesophageal reflux disease): PPI.    HTN (hypertension): Continue home Bystolic.    Patient is stable from the medicine point of view for discharge pending PT and Ortho eval.

## 2023-10-07 NOTE — DISCHARGE NOTE NURSING/CASE MANAGEMENT/SOCIAL WORK - PATIENT PORTAL LINK FT
You can access the FollowMyHealth Patient Portal offered by [facility] by registering at the following website: [email or web address]. By joining [facility]’s FollowMyHealth portal, you will also be able to view your health information using other applications (apps) compatible with our system.

## 2023-10-07 NOTE — DISCHARGE NOTE NURSING/CASE MANAGEMENT/SOCIAL WORK - NSDCPEFALRISK_GEN_ALL_CORE
For information on Fall & Injury Prevention, visit: [email or web address] OR  [email or web address] OR  https://www.cdc.gov/steadi/patient.html

## 2023-10-08 ENCOUNTER — EMERGENCY (EMERGENCY)
Facility: HOSPITAL | Age: 68
LOS: 1 days | Discharge: HOME | End: 2023-10-08
Attending: EMERGENCY MEDICINE
Payer: MEDICARE

## 2023-10-08 VITALS
HEART RATE: 89 BPM | HEIGHT: 66 IN | RESPIRATION RATE: 18 BRPM | WEIGHT: 178.79 LBS | SYSTOLIC BLOOD PRESSURE: 177 MMHG | DIASTOLIC BLOOD PRESSURE: 82 MMHG | OXYGEN SATURATION: 98 % | TEMPERATURE: 98 F

## 2023-10-08 VITALS
DIASTOLIC BLOOD PRESSURE: 71 MMHG | OXYGEN SATURATION: 94 % | RESPIRATION RATE: 18 BRPM | HEART RATE: 76 BPM | TEMPERATURE: 98 F | SYSTOLIC BLOOD PRESSURE: 136 MMHG

## 2023-10-08 DIAGNOSIS — Z98.890 OTHER SPECIFIED POSTPROCEDURAL STATES: Chronic | ICD-10-CM

## 2023-10-08 DIAGNOSIS — Z90.49 ACQUIRED ABSENCE OF OTHER SPECIFIED PARTS OF DIGESTIVE TRACT: Chronic | ICD-10-CM

## 2023-10-08 DIAGNOSIS — Z98.51 TUBAL LIGATION STATUS: Chronic | ICD-10-CM

## 2023-10-08 LAB
ALBUMIN SERPL ELPH-MCNC: 4.6 G/DL — SIGNIFICANT CHANGE UP (ref 3.3–5.2)
ALP SERPL-CCNC: 86 U/L — SIGNIFICANT CHANGE UP (ref 40–120)
ALT FLD-CCNC: 16 U/L — SIGNIFICANT CHANGE UP
ANION GAP SERPL CALC-SCNC: 14 MMOL/L — SIGNIFICANT CHANGE UP (ref 5–17)
APTT BLD: 27.5 SEC — SIGNIFICANT CHANGE UP (ref 24.5–35.6)
AST SERPL-CCNC: 27 U/L — SIGNIFICANT CHANGE UP
BASOPHILS # BLD AUTO: 0.09 K/UL — SIGNIFICANT CHANGE UP (ref 0–0.2)
BASOPHILS NFR BLD AUTO: 0.7 % — SIGNIFICANT CHANGE UP (ref 0–2)
BILIRUB SERPL-MCNC: 0.7 MG/DL — SIGNIFICANT CHANGE UP (ref 0.4–2)
BUN SERPL-MCNC: 10.2 MG/DL — SIGNIFICANT CHANGE UP (ref 8–20)
CALCIUM SERPL-MCNC: 9.7 MG/DL — SIGNIFICANT CHANGE UP (ref 8.4–10.5)
CHLORIDE SERPL-SCNC: 99 MMOL/L — SIGNIFICANT CHANGE UP (ref 96–108)
CO2 SERPL-SCNC: 25 MMOL/L — SIGNIFICANT CHANGE UP (ref 22–29)
CREAT SERPL-MCNC: 0.53 MG/DL — SIGNIFICANT CHANGE UP (ref 0.5–1.3)
EGFR: 101 ML/MIN/1.73M2 — SIGNIFICANT CHANGE UP
EOSINOPHIL # BLD AUTO: 0.17 K/UL — SIGNIFICANT CHANGE UP (ref 0–0.5)
EOSINOPHIL NFR BLD AUTO: 1.3 % — SIGNIFICANT CHANGE UP (ref 0–6)
GLUCOSE SERPL-MCNC: 111 MG/DL — HIGH (ref 70–99)
HCT VFR BLD CALC: 37 % — SIGNIFICANT CHANGE UP (ref 34.5–45)
HGB BLD-MCNC: 12.2 G/DL — SIGNIFICANT CHANGE UP (ref 11.5–15.5)
IMM GRANULOCYTES NFR BLD AUTO: 0.7 % — SIGNIFICANT CHANGE UP (ref 0–0.9)
INR BLD: 1.04 RATIO — SIGNIFICANT CHANGE UP (ref 0.85–1.18)
LYMPHOCYTES # BLD AUTO: 1.96 K/UL — SIGNIFICANT CHANGE UP (ref 1–3.3)
LYMPHOCYTES # BLD AUTO: 14.7 % — SIGNIFICANT CHANGE UP (ref 13–44)
MCHC RBC-ENTMCNC: 28.8 PG — SIGNIFICANT CHANGE UP (ref 27–34)
MCHC RBC-ENTMCNC: 33 GM/DL — SIGNIFICANT CHANGE UP (ref 32–36)
MCV RBC AUTO: 87.3 FL — SIGNIFICANT CHANGE UP (ref 80–100)
MONOCYTES # BLD AUTO: 1.09 K/UL — HIGH (ref 0–0.9)
MONOCYTES NFR BLD AUTO: 8.2 % — SIGNIFICANT CHANGE UP (ref 2–14)
NEUTROPHILS # BLD AUTO: 9.93 K/UL — HIGH (ref 1.8–7.4)
NEUTROPHILS NFR BLD AUTO: 74.4 % — SIGNIFICANT CHANGE UP (ref 43–77)
PLATELET # BLD AUTO: 327 K/UL — SIGNIFICANT CHANGE UP (ref 150–400)
POTASSIUM SERPL-MCNC: 3.7 MMOL/L — SIGNIFICANT CHANGE UP (ref 3.5–5.3)
POTASSIUM SERPL-SCNC: 3.7 MMOL/L — SIGNIFICANT CHANGE UP (ref 3.5–5.3)
PROT SERPL-MCNC: 8.1 G/DL — SIGNIFICANT CHANGE UP (ref 6.6–8.7)
PROTHROM AB SERPL-ACNC: 11.5 SEC — SIGNIFICANT CHANGE UP (ref 9.5–13)
RBC # BLD: 4.24 M/UL — SIGNIFICANT CHANGE UP (ref 3.8–5.2)
RBC # FLD: 13.8 % — SIGNIFICANT CHANGE UP (ref 10.3–14.5)
SODIUM SERPL-SCNC: 138 MMOL/L — SIGNIFICANT CHANGE UP (ref 135–145)
WBC # BLD: 13.33 K/UL — HIGH (ref 3.8–10.5)
WBC # FLD AUTO: 13.33 K/UL — HIGH (ref 3.8–10.5)

## 2023-10-08 PROCEDURE — 99284 EMERGENCY DEPT VISIT MOD MDM: CPT

## 2023-10-08 PROCEDURE — 99284 EMERGENCY DEPT VISIT MOD MDM: CPT | Mod: 25

## 2023-10-08 PROCEDURE — 93971 EXTREMITY STUDY: CPT

## 2023-10-08 PROCEDURE — 36415 COLL VENOUS BLD VENIPUNCTURE: CPT

## 2023-10-08 PROCEDURE — 85610 PROTHROMBIN TIME: CPT

## 2023-10-08 PROCEDURE — 96374 THER/PROPH/DIAG INJ IV PUSH: CPT

## 2023-10-08 PROCEDURE — 93971 EXTREMITY STUDY: CPT | Mod: 26,LT

## 2023-10-08 PROCEDURE — 85730 THROMBOPLASTIN TIME PARTIAL: CPT

## 2023-10-08 PROCEDURE — 85025 COMPLETE CBC W/AUTO DIFF WBC: CPT

## 2023-10-08 PROCEDURE — 96375 TX/PRO/DX INJ NEW DRUG ADDON: CPT

## 2023-10-08 PROCEDURE — 80053 COMPREHEN METABOLIC PANEL: CPT

## 2023-10-08 PROCEDURE — 87040 BLOOD CULTURE FOR BACTERIA: CPT

## 2023-10-08 RX ORDER — ACETAMINOPHEN 500 MG
1000 TABLET ORAL ONCE
Refills: 0 | Status: COMPLETED | OUTPATIENT
Start: 2023-10-08 | End: 2023-10-08

## 2023-10-08 RX ORDER — KETOROLAC TROMETHAMINE 30 MG/ML
15 SYRINGE (ML) INJECTION ONCE
Refills: 0 | Status: DISCONTINUED | OUTPATIENT
Start: 2023-10-08 | End: 2023-10-08

## 2023-10-08 RX ORDER — CYCLOBENZAPRINE HYDROCHLORIDE 10 MG/1
10 TABLET, FILM COATED ORAL ONCE
Refills: 0 | Status: COMPLETED | OUTPATIENT
Start: 2023-10-08 | End: 2023-10-08

## 2023-10-08 RX ADMIN — Medication 400 MILLIGRAM(S): at 07:59

## 2023-10-08 RX ADMIN — Medication 15 MILLIGRAM(S): at 09:25

## 2023-10-08 RX ADMIN — Medication 1000 MILLIGRAM(S): at 09:01

## 2023-10-08 RX ADMIN — CYCLOBENZAPRINE HYDROCHLORIDE 10 MILLIGRAM(S): 10 TABLET, FILM COATED ORAL at 08:00

## 2023-10-08 NOTE — ED PROVIDER NOTE - OBJECTIVE STATEMENT
Patient is a 67 yo female with PMHx HTN, GERD, L Total Knee replacement POD #2 presenting with L knee pain and swelling. Patient states she developed L knee pain, swelling, stiffness since being discharged on Friday. Denies drainage, bleeding, paresthesias, falls, blood thinners, fevers. Denies fevers, chills, dizziness, lightheadedness, dysphagia, dysarthria, diplopia, photophobia, syncope, cough, congestion, SOB, CP, abdominal pain, neck pain, back pain, diarrhea, dysuria, hematuria, hematochezia, hematemesis, n/v, recent travel, sick contacts.

## 2023-10-08 NOTE — ED ADULT NURSE NOTE - NSFALLHARMRISKINTERV_ED_ALL_ED

## 2023-10-08 NOTE — CONSULT NOTE ADULT - SUBJECTIVE AND OBJECTIVE BOX
Pt Name: ДМИТРИЙ EISENBERG    MRN: [id number]      Patient is a 68y Female presenting to the emergency department with a chief complaint of pain to left knee.  Pt s/p Left total knee arthroplasty 10/6/2023 with Dr. Jackson.  Pt discharged from [facility] yesterday 10/7/2023 and was doing well until she got home last night.  Pt states her pain began to increase.  She took some tylenol and an oxycodone that helped but pain returned.  She presents to the emergency room with pain/swelling to LLE.  Denies fever, nausea, vomiting, abdominal pain.  PT voiding and eating without difficulty     HEALTH ISSUES - PROBLEM Dx:  HTN  GERD    REVIEW OF SYSTEMS	  Skin/Breast: no rash  Respiratory and Thorax: no difficulty breathing, no SOB  Cardiovascular:	no palpitations, no CP  Gastrointestinal:	no abdominal pain, no nausea, no vomiting  Genitourinary:	no difficulty urinating  Musculoskeletal:	 see HPI      ROS is otherwise negative.    PAST MEDICAL & SURGICAL HISTORY:  HTN (hypertension)  GERD (gastroesophageal reflux disease)  Unilateral primary osteoarthritis, left knee  Thyroid nodule  Prediabetes    H/O tubal ligation  History of cholecystectomy  S/P excision of lipoma      Allergies: sulfa drugs (Hives)      Medications: ketorolac   Injectable 15 milliGRAM(s) IV Push Once      FAMILY HISTORY:  FH: Parkinson's disease (Sibling)  FH: heart attack (Mother)  FH: diabetes mellitus (Sibling)  FH: heart disease (Sibling)  FH: lung cancer (Sibling)    : non-contributory    Social History:     Ambulation: Walking independently [ ] With Cane [ ] With Walker [x ]  Bedbound [ ]                           12.2   13.33 )-----------( 327      ( 08 Oct 2023 08:00 )             37.0     10-08    138  |  99  |  10.2  ----------------------------<  111<H>  3.7   |  25.0  |  0.53    Ca    9.7      08 Oct 2023 08:00    TPro  8.1  /  Alb  4.6  /  TBili  0.7  /  DBili  x   /  AST  27  /  ALT  16  /  AlkPhos  86  10-08      PHYSICAL EXAM:    Vital Signs Last 24 Hrs  T(C): 37.4 (08 Oct 2023 08:08), Max: 37.4 (08 Oct 2023 08:08)  T(F): 99.3 (08 Oct 2023 08:08), Max: 99.3 (08 Oct 2023 08:08)  HR: 85 (08 Oct 2023 08:08) (78 - 89)  BP: 135/67 (08 Oct 2023 08:08) (135/67 - 177/82)  BP(mean): --  RR: 18 (08 Oct 2023 08:08) (18 - 18)  SpO2: 97% (08 Oct 2023 08:08) (96% - 98%)    Parameters below as of 08 Oct 2023 08:08  Patient On (Oxygen Delivery Method): room air      Daily Height in cm: 167.64 (08 Oct 2023 06:19)    Daily     Appearance: Alert, responsive, in no acute distress. Family at bedside  left Lower extremity   +mepilex dressing noted over knee c/d/i   no deformity  +ecchymosis, +swelling, no erythema  calf soft   +toe movement, sensation intact, pedal pulses palp    Imaging Studies: LLE US neg for DVT    A/P:  Pt is a  68y Female s/p Left total knee arthroplasty pod #2    PLAN:   * pain control continue with oxycodone 5mg every 6 hours, tylenol 975 every 8 hours for pain   * ice   * elevate   * encourage ROM  * follow up with Dr. Jackson at next scheduled post op appointment  Discussed with Dr. Jackson

## 2023-10-08 NOTE — ED ADULT NURSE NOTE - OBJECTIVE STATEMENT
pt presents A&O x4 c/o pain and swelling in the left knee. pt states "the pain starts in my back and goes down my left leg" pulse 2+ motor and sensory present in left lower extremity. pt denies fever and chills, SOB and chest pain, no JVD. bed in lowest locked position, able to make needs known.

## 2023-10-08 NOTE — ED PROVIDER NOTE - PHYSICAL EXAMINATION
Constitutional: Well appearing, awake, alert, oriented to person, place, and time/situation and in no apparent distress  ENMT: Airway patent nasal mucosa clear. Mouth with normal mucosa. Throat has no vesicles no oropharyngeal exudates and uvula is midline. No blood in the oropharynx  EYES: clear bilaterally, pupils equal, round and reactive to light  Cardiac: Regular rate, regular rhythm. Heart sounds S1, S2. No murmurs, rubs or gallops. Good capillary refill, 2+ pulses, no peripheral edema  Respiratory: Lungs CTAB, no use of accessory muscles, no crackles, satting 99% on RA in no distress  Gastrointestinal: Abdomen nondistended, non-tender, no rebound guarding or peritoneal signs  Genitourinary: No CVA tenderness, pelvis nontender, bladder nondistended  Musculoskeletal: Spine appears normal, range of motion is not limited, no muscle or joint tenderness. Full ROM at L hip. L knee with mild edema anteriorly, ttp posteriorly, sensation reflexes intact 2+ pulses capillary refill < 2 seconds, pain limited ROM   Neurological: Alert and oriented, no focal deficits, no motor or sensory deficits. CN 2-12 intact, PERRLA, EOMI, No FND, moving all extremities equally

## 2023-10-08 NOTE — ED PROVIDER NOTE - CLINICAL SUMMARY MEDICAL DECISION MAKING FREE TEXT BOX
Patient is a 67 yo female with PMHx HTN, GERD, L Total Knee replacement POD #2 presenting with L knee pain and swelling. VSS      Will check labs, r.o electrolyte abnormalities, control pain, xrays to r.o orthopedic injury, U/S to r.o DVT, consult ortho, continue to monitor. Patient is a 69 yo female with PMHx HTN, GERD, L Total Knee replacement POD #2 presenting with L knee pain and swelling. VSS      Will check labs, r.o electrolyte abnormalities, control pain, xrays to r.o orthopedic injury, U/S to r.o DVT, consult ortho, continue to monitor.    Kathi Collins DO: Patient seen by ortho- incision appears to be healing well no signs/symptoms of infection normal postoperative healing process. US reviewed- Bakers cyst no deep venous thrombosis. Emanuel rosario.

## 2023-10-08 NOTE — ED PROVIDER NOTE - ATTENDING CONTRIBUTION TO CARE
I performed a history and physical exam of the patient and discussed their management with the resident. I reviewed the resident's note and agree with the documented findings and plan of care. My medical decision making and observations are found below.      68-year-old female with past medical history of hypertension, GERD, left total knee replacement 2 days ago presenting with left knee pain, swelling, and  redness.  Denies any fevers or chills, denies any chest pain or shortness of breath.  On exam, left knee edematous, erythematous, tender to palpation,  with left calf tenderness, plan to obtain ultrasound duplex rule out DVT, x-ray, Ortho consult and reassess.

## 2023-10-08 NOTE — ED ADULT NURSE NOTE - NSICDXFAMILYHX_GEN_ALL_CORE_FT
FAMILY HISTORY:  Mother  Still living? Unknown  FH: heart attack, Age at diagnosis: Age Unknown    Sibling  Still living? Unknown  FH: diabetes mellitus, Age at diagnosis: Age Unknown  FH: heart disease, Age at diagnosis: Age Unknown  FH: lung cancer, Age at diagnosis: Age Unknown  FH: Parkinson's disease, Age at diagnosis: Age Unknown

## 2023-10-08 NOTE — ED PROVIDER NOTE - PATIENT PORTAL LINK FT
You can access the FollowMyHealth Patient Portal offered by [facility] by registering at the following website: [email or web address]. By joining [facility]’s FollowMyHealth portal, you will also be able to view your health information using other applications (apps) compatible with our system.

## 2023-10-08 NOTE — ED PROVIDER NOTE - NSFOLLOWUPINSTRUCTIONS_ED_ALL_ED_FT
1. Follow up with your primary care physician within 2-3days for reevaluation.  2.  Return to the Emergency Department immediately for any worsening, progressive or concerning symptoms.   3. Take all medications as prescribed.     Baker Cyst    A Baker cyst, also called a popliteal cyst, is a growth that forms at the back of the knee. The cyst forms when the fluid-filled sac (bursa) that cushions the knee joint becomes enlarged.    What are the causes?  In most cases, a Baker cyst results from another knee problem that causes swelling inside the knee. This makes the fluid inside the knee joint (synovial fluid) flow into the bursa behind the knee, causing the bursa to enlarge.    What increases the risk?  You may be more likely to develop a Baker cyst if you already have a knee problem, such as:  A tear in cartilage that cushions the knee joint (meniscal tear).  A tear in the tissues that connect the bones of the knee joint (ligament tear).  Knee swelling from osteoarthritis, rheumatoid arthritis, or gout.  What are the signs or symptoms?  The main symptom of this condition is a lump behind the knee. This may be the only symptom of the condition. The lump may be painful, especially when the knee is straightened. If the lump is painful, the pain may come and go. The knee may also be stiff.    Symptoms may quickly get more severe if the cyst breaks open (ruptures). If the cyst ruptures, you may feel the following in your knee and calf:  Sudden or worsening pain.  Swelling.  Bruising.  Redness in the calf.  A Baker cyst does not always cause symptoms.    How is this diagnosed?  This condition may be diagnosed based on your symptoms and medical history. Your health care provider will also do a physical exam. This may include:  Feeling the cyst to check whether it is tender.  Checking your knee for signs of another knee condition that causes swelling.  You may have imaging tests, such as:  X-rays.  MRI.  Ultrasound.  How is this treated?  A Baker cyst that is not painful may go away without treatment. If the cyst gets large or painful, it will likely get better if the underlying knee problem is treated.    If needed, treatment for a Baker cyst may include:  Resting.  Keeping weight off of the knee. This means not leaning on the knee to support your body weight.  Taking NSAIDs, such as ibuprofen, to reduce pain and swelling.  Having a procedure to drain the fluid from the cyst with a needle (aspiration). You may also get an injection of a medicine that reduces swelling (steroid).  Having surgery. This may be needed if other treatments do not work. This usually involves correcting knee damage and removing the cyst.  Follow these instructions at home:    Activity    Rest as told by your health care provider.  Avoid activities that make pain or swelling worse.  Return to your normal activities as told by your health care provider. Ask your health care provider what activities are safe for you.  Do not use the injured limb to support your body weight until your health care provider says that you can. Use crutches as told by your health care provider.  General instructions    Take over-the-counter and prescription medicines only as told by your health care provider.  Keep all follow-up visits as told by your health care provider. This is important.  Contact a health care provider if:  You have knee pain, stiffness, or swelling that does not get better.  Get help right away if:  You have sudden or worsening pain and swelling in your calf area.  Summary  A Baker cyst, also called a popliteal cyst, is a growth that forms at the back of the knee.  In most cases, a Baker cyst results from another knee problem that causes swelling inside the knee.  A Baker cyst that is not painful may go away without treatment.  If needed, treatment for a Baker cyst may include resting, keeping weight off of the knee, medicines, or draining fluid from the cyst.  Surgery may be needed if other treatments are not effective.  This information is not intended to replace advice given to you by your health care provider. Make sure you discuss any questions you have with your health care provider.

## 2023-10-08 NOTE — ED ADULT NURSE NOTE - NSICDXPASTMEDICALHX_GEN_ALL_CORE_FT
PAST MEDICAL HISTORY:  GERD (gastroesophageal reflux disease)     HTN (hypertension)     Prediabetes     Thyroid nodule     Unilateral primary osteoarthritis, left knee

## 2023-10-08 NOTE — ED ADULT TRIAGE NOTE - CHIEF COMPLAINT QUOTE
s/p knee surgery on Friday. Report increase pain and swelling to the left knee. Denies fever or chills. Took oxycodone for pain last night.

## 2023-10-13 LAB
CULTURE RESULTS: SIGNIFICANT CHANGE UP
CULTURE RESULTS: SIGNIFICANT CHANGE UP
SPECIMEN SOURCE: SIGNIFICANT CHANGE UP
SPECIMEN SOURCE: SIGNIFICANT CHANGE UP

## 2023-10-18 ENCOUNTER — APPOINTMENT (OUTPATIENT)
Dept: RHEUMATOLOGY | Facility: CLINIC | Age: 68
End: 2023-10-18

## 2023-10-24 ENCOUNTER — INPATIENT (INPATIENT)
Facility: HOSPITAL | Age: 68
LOS: 6 days | Discharge: HOME | DRG: 378 | End: 2023-10-31
Attending: STUDENT IN AN ORGANIZED HEALTH CARE EDUCATION/TRAINING PROGRAM | Admitting: INTERNAL MEDICINE
Payer: MEDICARE

## 2023-10-24 VITALS
TEMPERATURE: 98 F | RESPIRATION RATE: 16 BRPM | HEART RATE: 89 BPM | OXYGEN SATURATION: 99 % | WEIGHT: 139.99 LBS | DIASTOLIC BLOOD PRESSURE: 56 MMHG | SYSTOLIC BLOOD PRESSURE: 126 MMHG | HEIGHT: 66 IN

## 2023-10-24 DIAGNOSIS — K62.5 HEMORRHAGE OF ANUS AND RECTUM: ICD-10-CM

## 2023-10-24 DIAGNOSIS — Z98.890 OTHER SPECIFIED POSTPROCEDURAL STATES: Chronic | ICD-10-CM

## 2023-10-24 DIAGNOSIS — Z98.51 TUBAL LIGATION STATUS: Chronic | ICD-10-CM

## 2023-10-24 DIAGNOSIS — Z90.49 ACQUIRED ABSENCE OF OTHER SPECIFIED PARTS OF DIGESTIVE TRACT: Chronic | ICD-10-CM

## 2023-10-24 DIAGNOSIS — K92.2 GASTROINTESTINAL HEMORRHAGE, UNSPECIFIED: ICD-10-CM

## 2023-10-24 LAB
ALBUMIN SERPL ELPH-MCNC: 3.7 G/DL — SIGNIFICANT CHANGE UP (ref 3.3–5.2)
ALBUMIN SERPL ELPH-MCNC: 3.7 G/DL — SIGNIFICANT CHANGE UP (ref 3.3–5.2)
ALP SERPL-CCNC: 106 U/L — SIGNIFICANT CHANGE UP (ref 40–120)
ALP SERPL-CCNC: 106 U/L — SIGNIFICANT CHANGE UP (ref 40–120)
ALT FLD-CCNC: 12 U/L — SIGNIFICANT CHANGE UP
ALT FLD-CCNC: 12 U/L — SIGNIFICANT CHANGE UP
ANION GAP SERPL CALC-SCNC: 15 MMOL/L — SIGNIFICANT CHANGE UP (ref 5–17)
ANION GAP SERPL CALC-SCNC: 15 MMOL/L — SIGNIFICANT CHANGE UP (ref 5–17)
APTT BLD: 28.2 SEC — SIGNIFICANT CHANGE UP (ref 24.5–35.6)
APTT BLD: 28.2 SEC — SIGNIFICANT CHANGE UP (ref 24.5–35.6)
AST SERPL-CCNC: 16 U/L — SIGNIFICANT CHANGE UP
AST SERPL-CCNC: 16 U/L — SIGNIFICANT CHANGE UP
BASOPHILS # BLD AUTO: 0.13 K/UL — SIGNIFICANT CHANGE UP (ref 0–0.2)
BASOPHILS # BLD AUTO: 0.13 K/UL — SIGNIFICANT CHANGE UP (ref 0–0.2)
BASOPHILS NFR BLD AUTO: 0.7 % — SIGNIFICANT CHANGE UP (ref 0–2)
BASOPHILS NFR BLD AUTO: 0.7 % — SIGNIFICANT CHANGE UP (ref 0–2)
BILIRUB SERPL-MCNC: 0.4 MG/DL — SIGNIFICANT CHANGE UP (ref 0.4–2)
BILIRUB SERPL-MCNC: 0.4 MG/DL — SIGNIFICANT CHANGE UP (ref 0.4–2)
BLD GP AB SCN SERPL QL: SIGNIFICANT CHANGE UP
BLD GP AB SCN SERPL QL: SIGNIFICANT CHANGE UP
BUN SERPL-MCNC: 20.8 MG/DL — HIGH (ref 8–20)
BUN SERPL-MCNC: 20.8 MG/DL — HIGH (ref 8–20)
CALCIUM SERPL-MCNC: 9 MG/DL — SIGNIFICANT CHANGE UP (ref 8.4–10.5)
CALCIUM SERPL-MCNC: 9 MG/DL — SIGNIFICANT CHANGE UP (ref 8.4–10.5)
CHLORIDE SERPL-SCNC: 103 MMOL/L — SIGNIFICANT CHANGE UP (ref 96–108)
CHLORIDE SERPL-SCNC: 103 MMOL/L — SIGNIFICANT CHANGE UP (ref 96–108)
CO2 SERPL-SCNC: 22 MMOL/L — SIGNIFICANT CHANGE UP (ref 22–29)
CO2 SERPL-SCNC: 22 MMOL/L — SIGNIFICANT CHANGE UP (ref 22–29)
CREAT SERPL-MCNC: 0.57 MG/DL — SIGNIFICANT CHANGE UP (ref 0.5–1.3)
CREAT SERPL-MCNC: 0.57 MG/DL — SIGNIFICANT CHANGE UP (ref 0.5–1.3)
EGFR: 100 ML/MIN/1.73M2 — SIGNIFICANT CHANGE UP
EGFR: 100 ML/MIN/1.73M2 — SIGNIFICANT CHANGE UP
EOSINOPHIL # BLD AUTO: 0.15 K/UL — SIGNIFICANT CHANGE UP (ref 0–0.5)
EOSINOPHIL # BLD AUTO: 0.15 K/UL — SIGNIFICANT CHANGE UP (ref 0–0.5)
EOSINOPHIL NFR BLD AUTO: 0.8 % — SIGNIFICANT CHANGE UP (ref 0–6)
EOSINOPHIL NFR BLD AUTO: 0.8 % — SIGNIFICANT CHANGE UP (ref 0–6)
GLUCOSE SERPL-MCNC: 147 MG/DL — HIGH (ref 70–99)
GLUCOSE SERPL-MCNC: 147 MG/DL — HIGH (ref 70–99)
HCT VFR BLD CALC: 25.7 % — LOW (ref 34.5–45)
HCT VFR BLD CALC: 25.7 % — LOW (ref 34.5–45)
HCT VFR BLD CALC: 26 % — LOW (ref 34.5–45)
HCT VFR BLD CALC: 26 % — LOW (ref 34.5–45)
HCT VFR BLD CALC: 33.2 % — LOW (ref 34.5–45)
HCT VFR BLD CALC: 33.2 % — LOW (ref 34.5–45)
HGB BLD-MCNC: 10.9 G/DL — LOW (ref 11.5–15.5)
HGB BLD-MCNC: 10.9 G/DL — LOW (ref 11.5–15.5)
HGB BLD-MCNC: 8.5 G/DL — LOW (ref 11.5–15.5)
IMM GRANULOCYTES NFR BLD AUTO: 0.9 % — SIGNIFICANT CHANGE UP (ref 0–0.9)
IMM GRANULOCYTES NFR BLD AUTO: 0.9 % — SIGNIFICANT CHANGE UP (ref 0–0.9)
INR BLD: 1.03 RATIO — SIGNIFICANT CHANGE UP (ref 0.85–1.18)
INR BLD: 1.03 RATIO — SIGNIFICANT CHANGE UP (ref 0.85–1.18)
LYMPHOCYTES # BLD AUTO: 1.66 K/UL — SIGNIFICANT CHANGE UP (ref 1–3.3)
LYMPHOCYTES # BLD AUTO: 1.66 K/UL — SIGNIFICANT CHANGE UP (ref 1–3.3)
LYMPHOCYTES # BLD AUTO: 8.8 % — LOW (ref 13–44)
LYMPHOCYTES # BLD AUTO: 8.8 % — LOW (ref 13–44)
MCHC RBC-ENTMCNC: 28.6 PG — SIGNIFICANT CHANGE UP (ref 27–34)
MCHC RBC-ENTMCNC: 28.6 PG — SIGNIFICANT CHANGE UP (ref 27–34)
MCHC RBC-ENTMCNC: 29 PG — SIGNIFICANT CHANGE UP (ref 27–34)
MCHC RBC-ENTMCNC: 29 PG — SIGNIFICANT CHANGE UP (ref 27–34)
MCHC RBC-ENTMCNC: 29.1 PG — SIGNIFICANT CHANGE UP (ref 27–34)
MCHC RBC-ENTMCNC: 29.1 PG — SIGNIFICANT CHANGE UP (ref 27–34)
MCHC RBC-ENTMCNC: 32.7 GM/DL — SIGNIFICANT CHANGE UP (ref 32–36)
MCHC RBC-ENTMCNC: 32.7 GM/DL — SIGNIFICANT CHANGE UP (ref 32–36)
MCHC RBC-ENTMCNC: 32.8 GM/DL — SIGNIFICANT CHANGE UP (ref 32–36)
MCHC RBC-ENTMCNC: 32.8 GM/DL — SIGNIFICANT CHANGE UP (ref 32–36)
MCHC RBC-ENTMCNC: 33.1 GM/DL — SIGNIFICANT CHANGE UP (ref 32–36)
MCHC RBC-ENTMCNC: 33.1 GM/DL — SIGNIFICANT CHANGE UP (ref 32–36)
MCV RBC AUTO: 87.5 FL — SIGNIFICANT CHANGE UP (ref 80–100)
MCV RBC AUTO: 87.5 FL — SIGNIFICANT CHANGE UP (ref 80–100)
MCV RBC AUTO: 88 FL — SIGNIFICANT CHANGE UP (ref 80–100)
MCV RBC AUTO: 88 FL — SIGNIFICANT CHANGE UP (ref 80–100)
MCV RBC AUTO: 88.3 FL — SIGNIFICANT CHANGE UP (ref 80–100)
MCV RBC AUTO: 88.3 FL — SIGNIFICANT CHANGE UP (ref 80–100)
MONOCYTES # BLD AUTO: 0.82 K/UL — SIGNIFICANT CHANGE UP (ref 0–0.9)
MONOCYTES # BLD AUTO: 0.82 K/UL — SIGNIFICANT CHANGE UP (ref 0–0.9)
MONOCYTES NFR BLD AUTO: 4.4 % — SIGNIFICANT CHANGE UP (ref 2–14)
MONOCYTES NFR BLD AUTO: 4.4 % — SIGNIFICANT CHANGE UP (ref 2–14)
NEUTROPHILS # BLD AUTO: 15.86 K/UL — HIGH (ref 1.8–7.4)
NEUTROPHILS # BLD AUTO: 15.86 K/UL — HIGH (ref 1.8–7.4)
NEUTROPHILS NFR BLD AUTO: 84.4 % — HIGH (ref 43–77)
NEUTROPHILS NFR BLD AUTO: 84.4 % — HIGH (ref 43–77)
OB PNL STL: POSITIVE
OB PNL STL: POSITIVE
PLATELET # BLD AUTO: 387 K/UL — SIGNIFICANT CHANGE UP (ref 150–400)
PLATELET # BLD AUTO: 387 K/UL — SIGNIFICANT CHANGE UP (ref 150–400)
PLATELET # BLD AUTO: 504 K/UL — HIGH (ref 150–400)
PLATELET # BLD AUTO: 504 K/UL — HIGH (ref 150–400)
PLATELET # BLD AUTO: 554 K/UL — HIGH (ref 150–400)
PLATELET # BLD AUTO: 554 K/UL — HIGH (ref 150–400)
POTASSIUM SERPL-MCNC: 3.9 MMOL/L — SIGNIFICANT CHANGE UP (ref 3.5–5.3)
POTASSIUM SERPL-MCNC: 3.9 MMOL/L — SIGNIFICANT CHANGE UP (ref 3.5–5.3)
POTASSIUM SERPL-SCNC: 3.9 MMOL/L — SIGNIFICANT CHANGE UP (ref 3.5–5.3)
POTASSIUM SERPL-SCNC: 3.9 MMOL/L — SIGNIFICANT CHANGE UP (ref 3.5–5.3)
PROT SERPL-MCNC: 6.7 G/DL — SIGNIFICANT CHANGE UP (ref 6.6–8.7)
PROT SERPL-MCNC: 6.7 G/DL — SIGNIFICANT CHANGE UP (ref 6.6–8.7)
PROTHROM AB SERPL-ACNC: 11.4 SEC — SIGNIFICANT CHANGE UP (ref 9.5–13)
PROTHROM AB SERPL-ACNC: 11.4 SEC — SIGNIFICANT CHANGE UP (ref 9.5–13)
RBC # BLD: 2.92 M/UL — LOW (ref 3.8–5.2)
RBC # BLD: 2.92 M/UL — LOW (ref 3.8–5.2)
RBC # BLD: 2.97 M/UL — LOW (ref 3.8–5.2)
RBC # BLD: 2.97 M/UL — LOW (ref 3.8–5.2)
RBC # BLD: 3.76 M/UL — LOW (ref 3.8–5.2)
RBC # BLD: 3.76 M/UL — LOW (ref 3.8–5.2)
RBC # FLD: 13.4 % — SIGNIFICANT CHANGE UP (ref 10.3–14.5)
RBC # FLD: 13.4 % — SIGNIFICANT CHANGE UP (ref 10.3–14.5)
RBC # FLD: 13.5 % — SIGNIFICANT CHANGE UP (ref 10.3–14.5)
RBC # FLD: 13.5 % — SIGNIFICANT CHANGE UP (ref 10.3–14.5)
RBC # FLD: 13.6 % — SIGNIFICANT CHANGE UP (ref 10.3–14.5)
RBC # FLD: 13.6 % — SIGNIFICANT CHANGE UP (ref 10.3–14.5)
SODIUM SERPL-SCNC: 140 MMOL/L — SIGNIFICANT CHANGE UP (ref 135–145)
SODIUM SERPL-SCNC: 140 MMOL/L — SIGNIFICANT CHANGE UP (ref 135–145)
WBC # BLD: 13.76 K/UL — HIGH (ref 3.8–10.5)
WBC # BLD: 13.76 K/UL — HIGH (ref 3.8–10.5)
WBC # BLD: 14.72 K/UL — HIGH (ref 3.8–10.5)
WBC # BLD: 14.72 K/UL — HIGH (ref 3.8–10.5)
WBC # BLD: 18.79 K/UL — HIGH (ref 3.8–10.5)
WBC # BLD: 18.79 K/UL — HIGH (ref 3.8–10.5)
WBC # FLD AUTO: 13.76 K/UL — HIGH (ref 3.8–10.5)
WBC # FLD AUTO: 13.76 K/UL — HIGH (ref 3.8–10.5)
WBC # FLD AUTO: 14.72 K/UL — HIGH (ref 3.8–10.5)
WBC # FLD AUTO: 14.72 K/UL — HIGH (ref 3.8–10.5)
WBC # FLD AUTO: 18.79 K/UL — HIGH (ref 3.8–10.5)
WBC # FLD AUTO: 18.79 K/UL — HIGH (ref 3.8–10.5)

## 2023-10-24 PROCEDURE — 74178 CT ABD&PLV WO CNTR FLWD CNTR: CPT | Mod: 26,ME

## 2023-10-24 PROCEDURE — 99223 1ST HOSP IP/OBS HIGH 75: CPT

## 2023-10-24 PROCEDURE — G1004: CPT

## 2023-10-24 PROCEDURE — 93970 EXTREMITY STUDY: CPT | Mod: 26

## 2023-10-24 PROCEDURE — 99285 EMERGENCY DEPT VISIT HI MDM: CPT

## 2023-10-24 RX ORDER — SOD SULF/SODIUM/NAHCO3/KCL/PEG
4000 SOLUTION, RECONSTITUTED, ORAL ORAL ONCE
Refills: 0 | Status: COMPLETED | OUTPATIENT
Start: 2023-10-24 | End: 2023-10-24

## 2023-10-24 RX ORDER — PIPERACILLIN AND TAZOBACTAM 4; .5 G/20ML; G/20ML
3.38 INJECTION, POWDER, LYOPHILIZED, FOR SOLUTION INTRAVENOUS ONCE
Refills: 0 | Status: DISCONTINUED | OUTPATIENT
Start: 2023-10-24 | End: 2023-10-24

## 2023-10-24 RX ORDER — PIPERACILLIN AND TAZOBACTAM 4; .5 G/20ML; G/20ML
3.38 INJECTION, POWDER, LYOPHILIZED, FOR SOLUTION INTRAVENOUS EVERY 8 HOURS
Refills: 0 | Status: COMPLETED | OUTPATIENT
Start: 2023-10-25 | End: 2023-10-29

## 2023-10-24 RX ORDER — SODIUM CHLORIDE 9 MG/ML
1000 INJECTION INTRAMUSCULAR; INTRAVENOUS; SUBCUTANEOUS
Refills: 0 | Status: DISCONTINUED | OUTPATIENT
Start: 2023-10-24 | End: 2023-10-25

## 2023-10-24 RX ORDER — PANTOPRAZOLE SODIUM 20 MG/1
40 TABLET, DELAYED RELEASE ORAL EVERY 12 HOURS
Refills: 0 | Status: DISCONTINUED | OUTPATIENT
Start: 2023-10-24 | End: 2023-10-27

## 2023-10-24 RX ORDER — ACETAMINOPHEN 500 MG
650 TABLET ORAL EVERY 6 HOURS
Refills: 0 | Status: DISCONTINUED | OUTPATIENT
Start: 2023-10-24 | End: 2023-10-28

## 2023-10-24 RX ORDER — SODIUM CHLORIDE 9 MG/ML
1000 INJECTION INTRAMUSCULAR; INTRAVENOUS; SUBCUTANEOUS ONCE
Refills: 0 | Status: COMPLETED | OUTPATIENT
Start: 2023-10-24 | End: 2023-10-24

## 2023-10-24 RX ORDER — PIPERACILLIN AND TAZOBACTAM 4; .5 G/20ML; G/20ML
3.38 INJECTION, POWDER, LYOPHILIZED, FOR SOLUTION INTRAVENOUS ONCE
Refills: 0 | Status: COMPLETED | OUTPATIENT
Start: 2023-10-25 | End: 2023-10-24

## 2023-10-24 RX ORDER — METOPROLOL TARTRATE 50 MG
12.5 TABLET ORAL EVERY 12 HOURS
Refills: 0 | Status: DISCONTINUED | OUTPATIENT
Start: 2023-10-24 | End: 2023-10-31

## 2023-10-24 RX ORDER — ONDANSETRON 8 MG/1
4 TABLET, FILM COATED ORAL ONCE
Refills: 0 | Status: COMPLETED | OUTPATIENT
Start: 2023-10-24 | End: 2023-10-24

## 2023-10-24 RX ADMIN — SODIUM CHLORIDE 1000 MILLILITER(S): 9 INJECTION INTRAMUSCULAR; INTRAVENOUS; SUBCUTANEOUS at 10:21

## 2023-10-24 RX ADMIN — Medication 12.5 MILLIGRAM(S): at 18:36

## 2023-10-24 RX ADMIN — PIPERACILLIN AND TAZOBACTAM 25 GRAM(S): 4; .5 INJECTION, POWDER, LYOPHILIZED, FOR SOLUTION INTRAVENOUS at 23:49

## 2023-10-24 RX ADMIN — SODIUM CHLORIDE 100 MILLILITER(S): 9 INJECTION INTRAMUSCULAR; INTRAVENOUS; SUBCUTANEOUS at 18:37

## 2023-10-24 RX ADMIN — SODIUM CHLORIDE 100 MILLILITER(S): 9 INJECTION INTRAMUSCULAR; INTRAVENOUS; SUBCUTANEOUS at 14:48

## 2023-10-24 RX ADMIN — Medication 4000 MILLILITER(S): at 18:37

## 2023-10-24 RX ADMIN — PANTOPRAZOLE SODIUM 40 MILLIGRAM(S): 20 TABLET, DELAYED RELEASE ORAL at 14:48

## 2023-10-24 RX ADMIN — SODIUM CHLORIDE 1000 MILLILITER(S): 9 INJECTION INTRAMUSCULAR; INTRAVENOUS; SUBCUTANEOUS at 11:08

## 2023-10-24 NOTE — H&P ADULT - HISTORY OF PRESENT ILLNESS
69 yo F PMH of HTN, GERD, pre-diabetes, hx of gerd/ terrell's , recent left knee replacement (10/06/23)/ has been on aspirin daily / didnot take it for last 2 days , now presents to Ed after rectal bleed. Patient reports 4-5 episodes of BRBP with clots from this morning. Her baseline hemoglobin was 13 gm on 09/18/23 and was 12 gm on 10/08 post knee replacement. Patient reports generalized weakness and "feeling dizzy". patient Noted to have large volume BRBPR with clots on exam. in addition to aspirin she has also been taking Meloxicam 7.5 mg BID for the past 3 weeks. Not on any anticoagulation. Denies excessive use of alcohol. Patient reports right sided abdominal pain 2 weeks ago and for which she had a CT abd and that showed ?diverticulosis/diverticulitis.  last EGD/ Colonoscopy on 12/2023 reportedly showed ?diverticulosis. In ED CTA abd pelv showed extensive colonic diverticulosis , Mild long segment right-sided pericolonic inflammatory changes could reflect colitis or mild diverticulitis. but no active bleed. patient noted with melanotic stools in ed/ also some hematochezia / clots   seen by gi . plan for egd / colonoscopy jo ann luo

## 2023-10-24 NOTE — ED PROVIDER NOTE - CLINICAL SUMMARY MEDICAL DECISION MAKING FREE TEXT BOX
labs and diagnostic imaging results reviewed with patient labs and diagnostic imaging results reviewed with patient; GI assessment noted; will admit

## 2023-10-24 NOTE — H&P ADULT - ASSESSMENT
69 yo F PMH of HTN, GERD, pre-diabetes, hx of gerd/ terrell's , recent left knee replacement (10/06/23)/ has been on aspirin daily / didnot take it for last 2 days , now presents to Ed after rectal bleed. Patient reports 4-5 episodes of BRBP with clots from this morning. Her baseline hemoglobin was 13 gm on 09/18/23 and was 12 gm on 10/08 post knee replacement. Patient reports generalized weakness and "feeling dizzy". patient Noted to have large volume BRBPR with clots on exam. in addition to aspirin she has also been taking Meloxicam 7.5 mg BID for the past 3 weeks. Not on any anticoagulation. Denies excessive use of alcohol. Patient reports right sided abdominal pain 2 weeks ago and for which she had a CT abd and that showed ?diverticulosis/diverticulitis.  last EGD/ Colonoscopy on 12/2023 reportedly showed ?diverticulosis. In ED CTA abd pelv showed extensive colonic diverticulosis , Mild long segment right-sided pericolonic inflammatory changes could reflect colitis or mild diverticulitis. but no active bleed. patient noted with melanotic stools in ed/ also some hematochezia / clots   seen by gi . plan for egd / colonoscopy jo ann m       > gib/ acute blood loss anemia   - acute diverticulitis /diverticulosis / r/p pud   - monitor on step down   - has been on aspirin/ meloxicam for recent TKR   - monitor h/h   - repeat   - c/w ppis   - clear liquids / npo after mn   - srinivasan tart on iv zosyn for possibel diverticulitis   - gi consulted       > htn   - hold home Bystolic   - bb low dose with holding parameters   - monitor     > recent TKR   - check duplex lower ext to r/o dvt     > dvt ppx: scds

## 2023-10-24 NOTE — ED ADULT TRIAGE NOTE - CHIEF COMPLAINT QUOTE
Pt A&Ox4 states "I've had about 5 or 5 episodes of rectal bleeding this morning." BIBA c/o slight abd pain nausea and rectal bleeding. Patient has no history

## 2023-10-24 NOTE — ED ADULT NURSE REASSESSMENT NOTE - NS ED NURSE REASSESS COMMENT FT1
Pt had another episode of rectal bledding, approx. 200mls. This RN spoke with Dr. Oro to notify her of the past 2 episodes pt has had. Repeat STAT CBC to be ordered. Pt c/o nausea & SOB. This RN coached pt's breathing & pt was placed on cardiac monitor. Pt reports SOB has now resolved. Pt's  @ bedside. Pt had another episode of rectal bleeding, approx. 200mls. This RN spoke with Dr. Oro to notify her of the past 2 episodes pt has had. Repeat STAT CBC to be ordered. Pt c/o nausea & SOB. This RN coached pt's breathing & pt was placed on cardiac monitor. Pt reports SOB has now resolved. Pt's  @ bedside. Pt had another episode of rectal bleeding, approx. 200mls. This RN spoke with Dr. Oro to notify her of the past 2 episodes pt has had. Repeat STAT CBC to be ordered. Pt c/o nausea, generalized abd pain, & SOB. This RN coached pt's breathing & pt was placed on cardiac monitor. Pt reports SOB has now resolved. Pt's  @ bedside. Pt A&Ox4 in NAD. bed locked, low, & call bell w/ in reach.

## 2023-10-24 NOTE — H&P ADULT - NSHPPHYSICALEXAM_GEN_ALL_CORE
Vital Signs Last 24 Hrs  T(C): 36.5 (24 Oct 2023 09:04), Max: 36.5 (24 Oct 2023 09:04)  T(F): 97.7 (24 Oct 2023 09:04), Max: 97.7 (24 Oct 2023 09:04)  HR: 98 (24 Oct 2023 14:00) (89 - 98)  BP: 159/74 (24 Oct 2023 14:00) (126/56 - 159/74)  BP(mean): --  RR: 22 (24 Oct 2023 14:00) (16 - 22)  SpO2: 98% (24 Oct 2023 14:00) (98% - 99%)    Parameters below as of 24 Oct 2023 14:00  Patient On (Oxygen Delivery Method): room air    PHYSICAL EXAM:  Constitutional: No acute distress  Neuro: Awake alert, oriented  HEENT: anicteric sclerae  CV: regular rate, regular rhythm  Pulm/chest: lung sounds clear/diminished bilaterally, no accessory muscle use noted  Abd: soft, nontender, nondistended +bowel sounds. No rigidity, rebound tenderness, or guarding  Rectal exam: Not performed, large volume rectal bleed with clots on bedpan   Ext: no edema  Skin: warm, no jaundice   Psych: calm, cooperative

## 2023-10-24 NOTE — H&P ADULT - NSHPLABSRESULTS_GEN_ALL_CORE
10.9   18.79 )-----------( 554      ( 24 Oct 2023 09:55 )             33.2   10-24    140  |  103  |  20.8<H>  ----------------------------<  147<H>  3.9   |  22.0  |  0.57    Ca    9.0      24 Oct 2023 09:55    TPro  6.7  /  Alb  3.7  /  TBili  0.4  /  DBili  x   /  AST  16  /  ALT  12  /  AlkPhos  106  10-24

## 2023-10-24 NOTE — ED ADULT NURSE NOTE - NSFALLHARMRISKINTERV_ED_ALL_ED

## 2023-10-24 NOTE — ED ADULT NURSE REASSESSMENT NOTE - NS ED NURSE REASSESS COMMENT FT1
Pt had 2nd moderate amount of BRBPR. Pt denies any complaints @ this time. Pt A&Ox4 in NAD. Bed locked, low, & call bell w/in reach.

## 2023-10-24 NOTE — PATIENT PROFILE ADULT - FALL HARM RISK - UNIVERSAL INTERVENTIONS
Bed in lowest position, wheels locked, appropriate side rails in place/Call bell, personal items and telephone in reach/Instruct patient to call for assistance before getting out of bed or chair/Non-slip footwear when patient is out of bed/[place] to call system/Physically safe environment - no spills, clutter or unnecessary equipment/Purposeful Proactive Rounding/Room/bathroom lighting operational, light cord in reach

## 2023-10-24 NOTE — ED ADULT NURSE NOTE - OBJECTIVE STATEMENT
Pt to ED c/o generalized abd pain and BRBPR 6-7x since 0500. pt also c/o lightheadedness & nausea. Pt denies sob, cp, n/v, dizziness. Denies blood thinners.

## 2023-10-24 NOTE — ED ADULT NURSE NOTE - HOW OFTEN DO YOU HAVE SIX OR MORE DRINKS ON ONE OCCASION?
biba from home sp slip and fall, head lac back of head, aox3 maex4 bleeding controlled, pt on asa Never

## 2023-10-24 NOTE — CONSULT NOTE ADULT - PROBLEM SELECTOR RECOMMENDATION 9
CTA with no active GI bleed, showed extensive colonic diverticulosis. Mild long segment right-sided pericolonic inflammatory changes could reflect colitis or mild diverticulitis. No active bleeding noted.   Hemoglobin 10.9 gm in ED, baseline 12 gm 2 weeks ago.  Symptomatic with dizziness, generalized weakness. Continue to have rectal bleed in ED  Most likely diverticular bleed.   Transfuse 1 -2 units rectal bleed for hemodynamic stability  Will plan for colonoscopy tomorrow. Colon prep to start today evening  Can start clear liquid diet as tolerated, Keep NPO after midnight.   Active Type and screen, INR <1.5,   Trend CBC, transfuse as needed  Pantoprazole 40 mg IV daily for GI mucosal protection   Obtain EGD/ colonoscopy reports from Dr. Richards.   Patient with BUN/ scr ratio >20, most likely due to dehydration, less likely upper GI bleed (BRBP with clots) CTA with no active GI bleed, showed extensive colonic diverticulosis. Mild long segment right-sided pericolonic inflammatory changes could reflect colitis or mild diverticulitis. No active bleeding noted.   Hemoglobin 10.9 gm in ED, baseline 12 gm 2 weeks ago.  Symptomatic with dizziness, generalized weakness. Continue to have rectal bleed in ED  Most likely diverticular bleed.   Transfuse 1 unit due to symptomatic anemia  Send stool for cdiff and GI PCR due to possible R sided colitis seen on CT  Will plan for EGD and colonoscopy tomorrow. Colon prep to start today evening as tolerated.  Can start clear liquid diet as tolerated, Keep NPO after midnight.   Active Type and screen, INR <1.5,   Trend CBC, transfuse as needed  Pantoprazole 40 mg IV BID for GI mucosal protection   Obtain EGD/ colonoscopy reports from Dr. Richards.   Patient with BUN/ scr ratio >20, most likely due to dehydration, less likely upper GI bleed (dark red blood with clots), however will perform EGD tomorrow as well

## 2023-10-25 LAB
ALBUMIN SERPL ELPH-MCNC: 2.9 G/DL — LOW (ref 3.3–5.2)
ALBUMIN SERPL ELPH-MCNC: 2.9 G/DL — LOW (ref 3.3–5.2)
ALP SERPL-CCNC: 62 U/L — SIGNIFICANT CHANGE UP (ref 40–120)
ALP SERPL-CCNC: 62 U/L — SIGNIFICANT CHANGE UP (ref 40–120)
ALT FLD-CCNC: 8 U/L — SIGNIFICANT CHANGE UP
ALT FLD-CCNC: 8 U/L — SIGNIFICANT CHANGE UP
ANION GAP SERPL CALC-SCNC: 13 MMOL/L — SIGNIFICANT CHANGE UP (ref 5–17)
ANION GAP SERPL CALC-SCNC: 13 MMOL/L — SIGNIFICANT CHANGE UP (ref 5–17)
APTT BLD: 26 SEC — SIGNIFICANT CHANGE UP (ref 24.5–35.6)
APTT BLD: 26 SEC — SIGNIFICANT CHANGE UP (ref 24.5–35.6)
AST SERPL-CCNC: 14 U/L — SIGNIFICANT CHANGE UP
AST SERPL-CCNC: 14 U/L — SIGNIFICANT CHANGE UP
BASE EXCESS BLDA CALC-SCNC: -3.2 MMOL/L — LOW (ref -2–3)
BASE EXCESS BLDA CALC-SCNC: -3.2 MMOL/L — LOW (ref -2–3)
BILIRUB SERPL-MCNC: 0.4 MG/DL — SIGNIFICANT CHANGE UP (ref 0.4–2)
BILIRUB SERPL-MCNC: 0.4 MG/DL — SIGNIFICANT CHANGE UP (ref 0.4–2)
BLOOD GAS COMMENTS ARTERIAL: SIGNIFICANT CHANGE UP
BLOOD GAS COMMENTS ARTERIAL: SIGNIFICANT CHANGE UP
BUN SERPL-MCNC: 18.5 MG/DL — SIGNIFICANT CHANGE UP (ref 8–20)
BUN SERPL-MCNC: 18.5 MG/DL — SIGNIFICANT CHANGE UP (ref 8–20)
CALCIUM SERPL-MCNC: 7.9 MG/DL — LOW (ref 8.4–10.5)
CALCIUM SERPL-MCNC: 7.9 MG/DL — LOW (ref 8.4–10.5)
CHLORIDE SERPL-SCNC: 104 MMOL/L — SIGNIFICANT CHANGE UP (ref 96–108)
CHLORIDE SERPL-SCNC: 104 MMOL/L — SIGNIFICANT CHANGE UP (ref 96–108)
CO2 SERPL-SCNC: 22 MMOL/L — SIGNIFICANT CHANGE UP (ref 22–29)
CO2 SERPL-SCNC: 22 MMOL/L — SIGNIFICANT CHANGE UP (ref 22–29)
CREAT SERPL-MCNC: 0.56 MG/DL — SIGNIFICANT CHANGE UP (ref 0.5–1.3)
CREAT SERPL-MCNC: 0.56 MG/DL — SIGNIFICANT CHANGE UP (ref 0.5–1.3)
EGFR: 100 ML/MIN/1.73M2 — SIGNIFICANT CHANGE UP
EGFR: 100 ML/MIN/1.73M2 — SIGNIFICANT CHANGE UP
GAS PNL BLDA: SIGNIFICANT CHANGE UP
GAS PNL BLDA: SIGNIFICANT CHANGE UP
GLUCOSE SERPL-MCNC: 135 MG/DL — HIGH (ref 70–99)
GLUCOSE SERPL-MCNC: 135 MG/DL — HIGH (ref 70–99)
HCO3 BLDA-SCNC: 23 MMOL/L — SIGNIFICANT CHANGE UP (ref 21–28)
HCO3 BLDA-SCNC: 23 MMOL/L — SIGNIFICANT CHANGE UP (ref 21–28)
HCT VFR BLD CALC: 21.8 % — LOW (ref 34.5–45)
HCT VFR BLD CALC: 23.6 % — LOW (ref 34.5–45)
HCT VFR BLD CALC: 23.6 % — LOW (ref 34.5–45)
HCT VFR BLD CALC: 24.8 % — LOW (ref 34.5–45)
HCT VFR BLD CALC: 24.8 % — LOW (ref 34.5–45)
HCV AB S/CO SERPL IA: 0.06 S/CO — SIGNIFICANT CHANGE UP (ref 0–0.99)
HCV AB S/CO SERPL IA: 0.06 S/CO — SIGNIFICANT CHANGE UP (ref 0–0.99)
HCV AB SERPL-IMP: SIGNIFICANT CHANGE UP
HCV AB SERPL-IMP: SIGNIFICANT CHANGE UP
HGB BLD-MCNC: 7.2 G/DL — LOW (ref 11.5–15.5)
HGB BLD-MCNC: 7.2 G/DL — LOW (ref 11.5–15.5)
HGB BLD-MCNC: 7.4 G/DL — LOW (ref 11.5–15.5)
HGB BLD-MCNC: 7.4 G/DL — LOW (ref 11.5–15.5)
HGB BLD-MCNC: 7.8 G/DL — LOW (ref 11.5–15.5)
HGB BLD-MCNC: 7.8 G/DL — LOW (ref 11.5–15.5)
HGB BLD-MCNC: 8.4 G/DL — LOW (ref 11.5–15.5)
HGB BLD-MCNC: 8.4 G/DL — LOW (ref 11.5–15.5)
HOROWITZ INDEX BLDA+IHG-RTO: 50 — SIGNIFICANT CHANGE UP
HOROWITZ INDEX BLDA+IHG-RTO: 50 — SIGNIFICANT CHANGE UP
INR BLD: 1.22 RATIO — HIGH (ref 0.85–1.18)
INR BLD: 1.22 RATIO — HIGH (ref 0.85–1.18)
MCHC RBC-ENTMCNC: 28.7 PG — SIGNIFICANT CHANGE UP (ref 27–34)
MCHC RBC-ENTMCNC: 28.7 PG — SIGNIFICANT CHANGE UP (ref 27–34)
MCHC RBC-ENTMCNC: 29.2 PG — SIGNIFICANT CHANGE UP (ref 27–34)
MCHC RBC-ENTMCNC: 29.2 PG — SIGNIFICANT CHANGE UP (ref 27–34)
MCHC RBC-ENTMCNC: 29.3 PG — SIGNIFICANT CHANGE UP (ref 27–34)
MCHC RBC-ENTMCNC: 29.3 PG — SIGNIFICANT CHANGE UP (ref 27–34)
MCHC RBC-ENTMCNC: 30 PG — SIGNIFICANT CHANGE UP (ref 27–34)
MCHC RBC-ENTMCNC: 30 PG — SIGNIFICANT CHANGE UP (ref 27–34)
MCHC RBC-ENTMCNC: 33 GM/DL — SIGNIFICANT CHANGE UP (ref 32–36)
MCHC RBC-ENTMCNC: 33 GM/DL — SIGNIFICANT CHANGE UP (ref 32–36)
MCHC RBC-ENTMCNC: 33.1 GM/DL — SIGNIFICANT CHANGE UP (ref 32–36)
MCHC RBC-ENTMCNC: 33.1 GM/DL — SIGNIFICANT CHANGE UP (ref 32–36)
MCHC RBC-ENTMCNC: 33.9 GM/DL — SIGNIFICANT CHANGE UP (ref 32–36)
MCV RBC AUTO: 86.2 FL — SIGNIFICANT CHANGE UP (ref 80–100)
MCV RBC AUTO: 86.2 FL — SIGNIFICANT CHANGE UP (ref 80–100)
MCV RBC AUTO: 86.8 FL — SIGNIFICANT CHANGE UP (ref 80–100)
MCV RBC AUTO: 86.8 FL — SIGNIFICANT CHANGE UP (ref 80–100)
MCV RBC AUTO: 88.6 FL — SIGNIFICANT CHANGE UP (ref 80–100)
PCO2 BLDA: 43 MMHG — SIGNIFICANT CHANGE UP (ref 32–45)
PCO2 BLDA: 43 MMHG — SIGNIFICANT CHANGE UP (ref 32–45)
PH BLDA: 7.33 — LOW (ref 7.35–7.45)
PH BLDA: 7.33 — LOW (ref 7.35–7.45)
PLATELET # BLD AUTO: 312 K/UL — SIGNIFICANT CHANGE UP (ref 150–400)
PLATELET # BLD AUTO: 312 K/UL — SIGNIFICANT CHANGE UP (ref 150–400)
PLATELET # BLD AUTO: 329 K/UL — SIGNIFICANT CHANGE UP (ref 150–400)
PLATELET # BLD AUTO: 329 K/UL — SIGNIFICANT CHANGE UP (ref 150–400)
PLATELET # BLD AUTO: 365 K/UL — SIGNIFICANT CHANGE UP (ref 150–400)
PLATELET # BLD AUTO: 365 K/UL — SIGNIFICANT CHANGE UP (ref 150–400)
PLATELET # BLD AUTO: 387 K/UL — SIGNIFICANT CHANGE UP (ref 150–400)
PLATELET # BLD AUTO: 387 K/UL — SIGNIFICANT CHANGE UP (ref 150–400)
PO2 BLDA: 231 MMHG — HIGH (ref 83–108)
PO2 BLDA: 231 MMHG — HIGH (ref 83–108)
POTASSIUM SERPL-MCNC: 3.3 MMOL/L — LOW (ref 3.5–5.3)
POTASSIUM SERPL-MCNC: 3.3 MMOL/L — LOW (ref 3.5–5.3)
POTASSIUM SERPL-SCNC: 3.3 MMOL/L — LOW (ref 3.5–5.3)
POTASSIUM SERPL-SCNC: 3.3 MMOL/L — LOW (ref 3.5–5.3)
PROT SERPL-MCNC: 4.7 G/DL — LOW (ref 6.6–8.7)
PROT SERPL-MCNC: 4.7 G/DL — LOW (ref 6.6–8.7)
PROTHROM AB SERPL-ACNC: 13.5 SEC — HIGH (ref 9.5–13)
PROTHROM AB SERPL-ACNC: 13.5 SEC — HIGH (ref 9.5–13)
RBC # BLD: 2.46 M/UL — LOW (ref 3.8–5.2)
RBC # BLD: 2.46 M/UL — LOW (ref 3.8–5.2)
RBC # BLD: 2.53 M/UL — LOW (ref 3.8–5.2)
RBC # BLD: 2.53 M/UL — LOW (ref 3.8–5.2)
RBC # BLD: 2.72 M/UL — LOW (ref 3.8–5.2)
RBC # BLD: 2.72 M/UL — LOW (ref 3.8–5.2)
RBC # BLD: 2.8 M/UL — LOW (ref 3.8–5.2)
RBC # BLD: 2.8 M/UL — LOW (ref 3.8–5.2)
RBC # FLD: 13.4 % — SIGNIFICANT CHANGE UP (ref 10.3–14.5)
RBC # FLD: 13.4 % — SIGNIFICANT CHANGE UP (ref 10.3–14.5)
RBC # FLD: 13.5 % — SIGNIFICANT CHANGE UP (ref 10.3–14.5)
RBC # FLD: 13.5 % — SIGNIFICANT CHANGE UP (ref 10.3–14.5)
RBC # FLD: 13.7 % — SIGNIFICANT CHANGE UP (ref 10.3–14.5)
SAO2 % BLDA: 99.7 % — HIGH (ref 94–98)
SAO2 % BLDA: 99.7 % — HIGH (ref 94–98)
SODIUM SERPL-SCNC: 139 MMOL/L — SIGNIFICANT CHANGE UP (ref 135–145)
SODIUM SERPL-SCNC: 139 MMOL/L — SIGNIFICANT CHANGE UP (ref 135–145)
WBC # BLD: 12.93 K/UL — HIGH (ref 3.8–10.5)
WBC # BLD: 12.93 K/UL — HIGH (ref 3.8–10.5)
WBC # BLD: 13.42 K/UL — HIGH (ref 3.8–10.5)
WBC # BLD: 13.42 K/UL — HIGH (ref 3.8–10.5)
WBC # BLD: 13.7 K/UL — HIGH (ref 3.8–10.5)
WBC # BLD: 13.7 K/UL — HIGH (ref 3.8–10.5)
WBC # BLD: 15.87 K/UL — HIGH (ref 3.8–10.5)
WBC # BLD: 15.87 K/UL — HIGH (ref 3.8–10.5)
WBC # FLD AUTO: 12.93 K/UL — HIGH (ref 3.8–10.5)
WBC # FLD AUTO: 12.93 K/UL — HIGH (ref 3.8–10.5)
WBC # FLD AUTO: 13.42 K/UL — HIGH (ref 3.8–10.5)
WBC # FLD AUTO: 13.42 K/UL — HIGH (ref 3.8–10.5)
WBC # FLD AUTO: 13.7 K/UL — HIGH (ref 3.8–10.5)
WBC # FLD AUTO: 13.7 K/UL — HIGH (ref 3.8–10.5)
WBC # FLD AUTO: 15.87 K/UL — HIGH (ref 3.8–10.5)
WBC # FLD AUTO: 15.87 K/UL — HIGH (ref 3.8–10.5)

## 2023-10-25 PROCEDURE — 76937 US GUIDE VASCULAR ACCESS: CPT | Mod: 26

## 2023-10-25 PROCEDURE — 99291 CRITICAL CARE FIRST HOUR: CPT | Mod: GC

## 2023-10-25 PROCEDURE — 71045 X-RAY EXAM CHEST 1 VIEW: CPT | Mod: 26

## 2023-10-25 PROCEDURE — 36245 INS CATH ABD/L-EXT ART 1ST: CPT | Mod: 59

## 2023-10-25 PROCEDURE — 99233 SBSQ HOSP IP/OBS HIGH 50: CPT

## 2023-10-25 PROCEDURE — 74178 CT ABD&PLV WO CNTR FLWD CNTR: CPT | Mod: 26

## 2023-10-25 PROCEDURE — 75726 ARTERY X-RAYS ABDOMEN: CPT | Mod: 26,59

## 2023-10-25 PROCEDURE — 36247 INS CATH ABD/L-EXT ART 3RD: CPT

## 2023-10-25 PROCEDURE — 75774 ARTERY X-RAY EACH VESSEL: CPT | Mod: 26

## 2023-10-25 RX ORDER — SODIUM CHLORIDE 9 MG/ML
1000 INJECTION, SOLUTION INTRAVENOUS ONCE
Refills: 0 | Status: COMPLETED | OUTPATIENT
Start: 2023-10-25 | End: 2023-10-25

## 2023-10-25 RX ORDER — POTASSIUM CHLORIDE 20 MEQ
10 PACKET (EA) ORAL
Refills: 0 | Status: COMPLETED | OUTPATIENT
Start: 2023-10-25 | End: 2023-10-26

## 2023-10-25 RX ORDER — PROPOFOL 10 MG/ML
50 INJECTION, EMULSION INTRAVENOUS
Qty: 500 | Refills: 0 | Status: DISCONTINUED | OUTPATIENT
Start: 2023-10-25 | End: 2023-10-25

## 2023-10-25 RX ORDER — CHLORHEXIDINE GLUCONATE 213 G/1000ML
1 SOLUTION TOPICAL DAILY
Refills: 0 | Status: DISCONTINUED | OUTPATIENT
Start: 2023-10-25 | End: 2023-10-31

## 2023-10-25 RX ORDER — ONDANSETRON 8 MG/1
4 TABLET, FILM COATED ORAL EVERY 6 HOURS
Refills: 0 | Status: DISCONTINUED | OUTPATIENT
Start: 2023-10-25 | End: 2023-10-31

## 2023-10-25 RX ADMIN — Medication 12.5 MILLIGRAM(S): at 05:29

## 2023-10-25 RX ADMIN — Medication 100 MILLIEQUIVALENT(S): at 23:50

## 2023-10-25 RX ADMIN — PIPERACILLIN AND TAZOBACTAM 25 GRAM(S): 4; .5 INJECTION, POWDER, LYOPHILIZED, FOR SOLUTION INTRAVENOUS at 08:58

## 2023-10-25 RX ADMIN — PROPOFOL 21 MICROGRAM(S)/KG/MIN: 10 INJECTION, EMULSION INTRAVENOUS at 18:27

## 2023-10-25 RX ADMIN — SODIUM CHLORIDE 100 MILLILITER(S): 9 INJECTION INTRAMUSCULAR; INTRAVENOUS; SUBCUTANEOUS at 05:28

## 2023-10-25 RX ADMIN — PANTOPRAZOLE SODIUM 40 MILLIGRAM(S): 20 TABLET, DELAYED RELEASE ORAL at 05:29

## 2023-10-25 RX ADMIN — PANTOPRAZOLE SODIUM 40 MILLIGRAM(S): 20 TABLET, DELAYED RELEASE ORAL at 19:12

## 2023-10-25 RX ADMIN — SODIUM CHLORIDE 100 MILLILITER(S): 9 INJECTION INTRAMUSCULAR; INTRAVENOUS; SUBCUTANEOUS at 08:58

## 2023-10-25 RX ADMIN — Medication 100 MILLIEQUIVALENT(S): at 22:31

## 2023-10-25 RX ADMIN — ONDANSETRON 4 MILLIGRAM(S): 8 TABLET, FILM COATED ORAL at 21:56

## 2023-10-25 NOTE — PROVIDER CONTACT NOTE (OTHER) - DATE AND TIME:
Audiologic Evaluation 3/17/2023:       Tata Hanson, a 43 y.o. female, was seen today in the clinic for an audiologic evaluation. Ms. Hanson reported a history of cholesteatoma, conductive hearing loss, and tinnitus of the left ear. Ms. Hanson's main complaint was dizziness and dysequilibrium that is constant, but noted that she has episodes where her symptoms worsen. Ms. Hanson denied otalgia and perceived hearing loss in the right ear.     Tympanometry revealed Type A tympanogram in the right ear and Type B tympanogram with large ear canal volume in the left ear. Audiogram results revealed normal hearing sensitivity in the right ear and moderate to severe conductive hearing loss in the left ear.  Speech reception thresholds were noted at 10 dB in the right ear and 60 dB in the left ear.  Speech discrimination scores were 100% in the right ear and 100% in the left ear.    Recommendations:  Otologic evaluation  Amplification consultation for the left ear  Annual audiogram  Hearing protection when in noise          
25-Oct-2023 13:50

## 2023-10-25 NOTE — PROGRESS NOTE ADULT - SUBJECTIVE AND OBJECTIVE BOX
IR Post Procedure Note    Diagnosis: LGIB    Procedure: Mesenteric Angiogram    : Sebastián Ruiz MD    Contrast: 80cc    Anesthesia: 1% Lidocaine Subcutaneous, GA    Estimated Blood Loss: Less than 10cc    Specimens: None    Complications: No Immediate Complications    Anticoagulation: Hold in Setting of Hemorrhage    Findings & Plan: R CFA Access w 5Fr sheath. SMA and GINNY angiograms show no acute bleeding. L colic investigated further without bleeding or PSA, Closure w angioseal.      Please call Interventional Radiology with any questions, concerns, or issues.

## 2023-10-25 NOTE — PROGRESS NOTE ADULT - SUBJECTIVE AND OBJECTIVE BOX
INTERVAL HPI/OVERNIGHT EVENTS:    CC: acute anemia sec GI bleed, GERD, hypertension      Chart and course reviewed.  tired, did not sleep last night  had rectal bleeding overnight    Vital Signs Last 24 Hrs  T(C): 37 (25 Oct 2023 12:00), Max: 37 (25 Oct 2023 07:13)  T(F): 98.6 (25 Oct 2023 12:00), Max: 98.6 (25 Oct 2023 07:13)  HR: 108 (25 Oct 2023 10:00) (80 - 138)  BP: 134/47 (25 Oct 2023 10:00) (111/50 - 159/74)  BP(mean): 71 (25 Oct 2023 10:00) (65 - 76)  RR: 18 (25 Oct 2023 10:00) (14 - 40)  SpO2: 100% (25 Oct 2023 10:00) (98% - 100%)    Parameters below as of 25 Oct 2023 10:00  Patient On (Oxygen Delivery Method): nasal cannula  O2 Flow (L/min): 2      PHYSICAL EXAM:    GENERAL: alert, pale, not in distress  CHEST/LUNG: b/l air entry  HEART: reg  ABDOMEN: soft, bs+  EXTREMITIES:  no edema, tenderness    MEDICATIONS  (STANDING):  metoprolol tartrate 12.5 milliGRAM(s) Oral every 12 hours  pantoprazole  Injectable 40 milliGRAM(s) IV Push every 12 hours  piperacillin/tazobactam IVPB.. 3.375 Gram(s) IV Intermittent every 8 hours  sodium chloride 0.9%. 1000 milliLiter(s) (100 mL/Hr) IV Continuous <Continuous>    MEDICATIONS  (PRN):  acetaminophen     Tablet .. 650 milliGRAM(s) Oral every 6 hours PRN Temp greater or equal to 38C (100.4F), Mild Pain (1 - 3)      Allergies    sulfa drugs (Hives)    Intolerances          LABS:                          8.4    12.93 )-----------( 329      ( 25 Oct 2023 11:08 )             24.8     10-24    140  |  103  |  20.8<H>  ----------------------------<  147<H>  3.9   |  22.0  |  0.57    Ca    9.0      24 Oct 2023 09:55    TPro  6.7  /  Alb  3.7  /  TBili  0.4  /  DBili  x   /  AST  16  /  ALT  12  /  AlkPhos  106  10-24    PT/INR - ( 24 Oct 2023 09:55 )   PT: 11.4 sec;   INR: 1.03 ratio         PTT - ( 24 Oct 2023 09:55 )  PTT:28.2 sec  Urinalysis Basic - ( 24 Oct 2023 09:55 )    Color: x / Appearance: x / SG: x / pH: x  Gluc: 147 mg/dL / Ketone: x  / Bili: x / Urobili: x   Blood: x / Protein: x / Nitrite: x   Leuk Esterase: x / RBC: x / WBC x   Sq Epi: x / Non Sq Epi: x / Bacteria: x        RADIOLOGY & ADDITIONAL TESTS:

## 2023-10-25 NOTE — PROGRESS NOTE ADULT - ASSESSMENT
68 yr old female with hypertension, GERD, Barretts esophagus, recent left knee replacement presented with complaints of rectal bleeding. Noted to have BRBPR with clots. CTA abdomen with no active bleed, revealed Extensive colonic diverticulosis. Mild long segment right-sided pericolonic inflammatory changes could reflect colitis or mild diverticulitis. Soft tissue surrounding the celiac artery, of unclear significance, measuring 12 x 7 mm . Her admission Hb was 10.2, GI consultation was requested. Patient is on aspirin at home, also using Meloxicam for pain. GI advised colonoscopy. She was given 2 units of PRBC.    1. Acute anemia, symptomatic, sec to lower GI bleed:  s/p 2 units of PRBC  repeat CBC this afternoon  scheduled for colonoscopy today  will repeat CT once anemia w/u done to assess findings on CT surrounding celiac artery.  telemetry.  continue PPI  was on aspirin and Meloxicam prior, on hold now.  on Zosyn for possible colitis.     2. Hypertension:  Continue Metoprolol 12.5 mg PO BID    3. GERD:  Continue PPI    4. DVT ppx:  SCDs    Discussed with patient and RN.  Further course pending colonoscopy and CBC results.

## 2023-10-25 NOTE — PROVIDER CONTACT NOTE (OTHER) - ASSESSMENT
py had bright red bloody bowel movement   pt felt dizzy and lightheadedness for about 15 seconds   HR when up to 140   symptoms resolved

## 2023-10-25 NOTE — CHART NOTE - NSCHARTNOTEFT_GEN_A_CORE
Called by Radiology - Active L transverse diverticular bleed. Patient being transferred to MICU for further intervention.

## 2023-10-25 NOTE — CHART NOTE - NSCHARTNOTEFT_GEN_A_CORE
67 yo F PMH of HTN, GERD, pre-diabetes, recent left knee replacement (10/06/23) presented to ED after rectal bleed. Patient was originally scheduled for colonoscopy today.   Her hemoglobin was 7.2 gm  this morning, symptomatic with BARLOW and for which she received 1 units of PRBC. Repeat hemoglobin at 11 am was 8.4gm. She started having rectal bleed with clots "around 12" noon and she was ordered to have another units of packed RBCs and was transported to endo units for colonoscopy.  On arrival to endo she was tachycardic, HR 130s bpm, , symptomatic with SOB.       < from: CT Abdomen and Pelvis w/wo IV Cont (10.25.23 @ 15:23) >    FINDINGS:  LOWER CHEST: Lung bases are clear. Mitral valvular calcifications.    LIVER: Right liver lobe hepatic cyst and subcentimeter hypodensities too   small to characterize.  BILE DUCTS: Normal caliber. CBD measures 1.2 cm postcholecystectomy.  GALLBLADDER: Cholecystectomy.  SPLEEN: Within normal limits.  PANCREAS: Within normal limits.  ADRENALS: Within normal limits.  KIDNEYS/URETERS: No renal stones or hydronephrosis. Symmetric homogeneous   enhancement.    BLADDER: Within normal limits.  REPRODUCTIVE ORGANS: Uterus myomatosis.    BOWEL: There is active contrast extravasation into the colonic lumen from   a left transverse colon diverticulum, best seen on image [id number].   Extensive colonic diverticulosis in the descending and sigmoid colon. Few   diverticula demonstrate subtle inflammation, at the transition of the   descending to sigmoid colon. Increased mucosal enhancement in the   rectosigmoid suggest mild inflammation. Uncomplicated paraduodenal   diverticulum.  Complex intraluminal fluid in the transverse colon, ascending colon and   in the rectosigmoid. No bowel obstruction. Appendix is normal.  PERITONEUM: No ascites. No pneumoperitoneum.  VESSELS: Atherosclerotic changes.  RETROPERITONEUM/LYMPH NODES: No lymphadenopathy.  ABDOMINAL WALL: Anterior abdominal wall periumbilical hernia containing   fat. Small bilateral inguinal fat-containing hernia.  BONES: Degenerative changes.    IMPRESSION:  Active GI bleed in the left transverse colon from a diverticulum.   Consider IR consultation.    Extensive colonic diverticulosis in the descending and sigmoid colon with   additional subtle acute diverticulitis in the proximal sigmoid colon.    Findings were discussed with ADALID Roberts by Dr. Card    with   read back confirmation at 3:28;pm, 10/25/2023    < end of copied text >    Plan:  Started LR 1 L. Contacted radiology, urgent CT angiogram was ordered. IR was contacted for possible embolization  PRBC x 1 ordered in addition to the initial order.   FFP x 1 and Platelet x 1 order in setting of active GI bleed.   CT angio was performed, which showed active  Left transverse diverticular bleed.   Patient was transferred to IR for possible embolization  MICU was consulted in the setting of active GI bleed.   Stat BMP ordered.

## 2023-10-26 ENCOUNTER — APPOINTMENT (OUTPATIENT)
Dept: ORTHOPEDIC SURGERY | Facility: CLINIC | Age: 68
End: 2023-10-26

## 2023-10-26 ENCOUNTER — TRANSCRIPTION ENCOUNTER (OUTPATIENT)
Age: 68
End: 2023-10-26

## 2023-10-26 LAB
ALBUMIN SERPL ELPH-MCNC: 2.5 G/DL — LOW (ref 3.3–5.2)
ALBUMIN SERPL ELPH-MCNC: 2.5 G/DL — LOW (ref 3.3–5.2)
ALP SERPL-CCNC: 51 U/L — SIGNIFICANT CHANGE UP (ref 40–120)
ALP SERPL-CCNC: 51 U/L — SIGNIFICANT CHANGE UP (ref 40–120)
ALT FLD-CCNC: 8 U/L — SIGNIFICANT CHANGE UP
ALT FLD-CCNC: 8 U/L — SIGNIFICANT CHANGE UP
ANION GAP SERPL CALC-SCNC: 13 MMOL/L — SIGNIFICANT CHANGE UP (ref 5–17)
ANION GAP SERPL CALC-SCNC: 13 MMOL/L — SIGNIFICANT CHANGE UP (ref 5–17)
ANION GAP SERPL CALC-SCNC: 14 MMOL/L — SIGNIFICANT CHANGE UP (ref 5–17)
ANION GAP SERPL CALC-SCNC: 14 MMOL/L — SIGNIFICANT CHANGE UP (ref 5–17)
ANISOCYTOSIS BLD QL: SLIGHT — SIGNIFICANT CHANGE UP
ANISOCYTOSIS BLD QL: SLIGHT — SIGNIFICANT CHANGE UP
AST SERPL-CCNC: 16 U/L — SIGNIFICANT CHANGE UP
AST SERPL-CCNC: 16 U/L — SIGNIFICANT CHANGE UP
BASOPHILS # BLD AUTO: 0.08 K/UL — SIGNIFICANT CHANGE UP (ref 0–0.2)
BASOPHILS # BLD AUTO: 0.08 K/UL — SIGNIFICANT CHANGE UP (ref 0–0.2)
BASOPHILS # BLD AUTO: 0.1 K/UL — SIGNIFICANT CHANGE UP (ref 0–0.2)
BASOPHILS # BLD AUTO: 0.1 K/UL — SIGNIFICANT CHANGE UP (ref 0–0.2)
BASOPHILS NFR BLD AUTO: 0.5 % — SIGNIFICANT CHANGE UP (ref 0–2)
BASOPHILS NFR BLD AUTO: 0.5 % — SIGNIFICANT CHANGE UP (ref 0–2)
BASOPHILS NFR BLD AUTO: 0.6 % — SIGNIFICANT CHANGE UP (ref 0–2)
BASOPHILS NFR BLD AUTO: 0.6 % — SIGNIFICANT CHANGE UP (ref 0–2)
BILIRUB SERPL-MCNC: 0.3 MG/DL — LOW (ref 0.4–2)
BILIRUB SERPL-MCNC: 0.3 MG/DL — LOW (ref 0.4–2)
BUN SERPL-MCNC: 21.5 MG/DL — HIGH (ref 8–20)
BUN SERPL-MCNC: 21.5 MG/DL — HIGH (ref 8–20)
BUN SERPL-MCNC: 24.2 MG/DL — HIGH (ref 8–20)
BUN SERPL-MCNC: 24.2 MG/DL — HIGH (ref 8–20)
BURR CELLS BLD QL SMEAR: PRESENT — SIGNIFICANT CHANGE UP
BURR CELLS BLD QL SMEAR: PRESENT — SIGNIFICANT CHANGE UP
CALCIUM SERPL-MCNC: 7.6 MG/DL — LOW (ref 8.4–10.5)
CHLORIDE SERPL-SCNC: 105 MMOL/L — SIGNIFICANT CHANGE UP (ref 96–108)
CHLORIDE SERPL-SCNC: 105 MMOL/L — SIGNIFICANT CHANGE UP (ref 96–108)
CHLORIDE SERPL-SCNC: 109 MMOL/L — HIGH (ref 96–108)
CHLORIDE SERPL-SCNC: 109 MMOL/L — HIGH (ref 96–108)
CO2 SERPL-SCNC: 20 MMOL/L — LOW (ref 22–29)
CO2 SERPL-SCNC: 20 MMOL/L — LOW (ref 22–29)
CO2 SERPL-SCNC: 21 MMOL/L — LOW (ref 22–29)
CO2 SERPL-SCNC: 21 MMOL/L — LOW (ref 22–29)
CREAT SERPL-MCNC: 0.69 MG/DL — SIGNIFICANT CHANGE UP (ref 0.5–1.3)
CREAT SERPL-MCNC: 0.69 MG/DL — SIGNIFICANT CHANGE UP (ref 0.5–1.3)
CREAT SERPL-MCNC: 0.97 MG/DL — SIGNIFICANT CHANGE UP (ref 0.5–1.3)
CREAT SERPL-MCNC: 0.97 MG/DL — SIGNIFICANT CHANGE UP (ref 0.5–1.3)
EGFR: 64 ML/MIN/1.73M2 — SIGNIFICANT CHANGE UP
EGFR: 64 ML/MIN/1.73M2 — SIGNIFICANT CHANGE UP
EGFR: 95 ML/MIN/1.73M2 — SIGNIFICANT CHANGE UP
EGFR: 95 ML/MIN/1.73M2 — SIGNIFICANT CHANGE UP
ELLIPTOCYTES BLD QL SMEAR: SLIGHT — SIGNIFICANT CHANGE UP
ELLIPTOCYTES BLD QL SMEAR: SLIGHT — SIGNIFICANT CHANGE UP
EOSINOPHIL # BLD AUTO: 0.11 K/UL — SIGNIFICANT CHANGE UP (ref 0–0.5)
EOSINOPHIL # BLD AUTO: 0.11 K/UL — SIGNIFICANT CHANGE UP (ref 0–0.5)
EOSINOPHIL # BLD AUTO: 0.38 K/UL — SIGNIFICANT CHANGE UP (ref 0–0.5)
EOSINOPHIL # BLD AUTO: 0.38 K/UL — SIGNIFICANT CHANGE UP (ref 0–0.5)
EOSINOPHIL NFR BLD AUTO: 0.6 % — SIGNIFICANT CHANGE UP (ref 0–6)
EOSINOPHIL NFR BLD AUTO: 0.6 % — SIGNIFICANT CHANGE UP (ref 0–6)
EOSINOPHIL NFR BLD AUTO: 2.1 % — SIGNIFICANT CHANGE UP (ref 0–6)
EOSINOPHIL NFR BLD AUTO: 2.1 % — SIGNIFICANT CHANGE UP (ref 0–6)
GLUCOSE SERPL-MCNC: 144 MG/DL — HIGH (ref 70–99)
GLUCOSE SERPL-MCNC: 144 MG/DL — HIGH (ref 70–99)
GLUCOSE SERPL-MCNC: 165 MG/DL — HIGH (ref 70–99)
GLUCOSE SERPL-MCNC: 165 MG/DL — HIGH (ref 70–99)
HCT VFR BLD CALC: 19.7 % — CRITICAL LOW (ref 34.5–45)
HCT VFR BLD CALC: 19.7 % — CRITICAL LOW (ref 34.5–45)
HCT VFR BLD CALC: 22 % — LOW (ref 34.5–45)
HCT VFR BLD CALC: 22 % — LOW (ref 34.5–45)
HCT VFR BLD CALC: 22.8 % — LOW (ref 34.5–45)
HCT VFR BLD CALC: 22.8 % — LOW (ref 34.5–45)
HCT VFR BLD CALC: 23 % — LOW (ref 34.5–45)
HCT VFR BLD CALC: 23 % — LOW (ref 34.5–45)
HGB BLD-MCNC: 6.9 G/DL — CRITICAL LOW (ref 11.5–15.5)
HGB BLD-MCNC: 6.9 G/DL — CRITICAL LOW (ref 11.5–15.5)
HGB BLD-MCNC: 7.5 G/DL — LOW (ref 11.5–15.5)
HGB BLD-MCNC: 7.5 G/DL — LOW (ref 11.5–15.5)
HGB BLD-MCNC: 8 G/DL — LOW (ref 11.5–15.5)
HGB BLD-MCNC: 8 G/DL — LOW (ref 11.5–15.5)
HGB BLD-MCNC: 8.1 G/DL — LOW (ref 11.5–15.5)
HGB BLD-MCNC: 8.1 G/DL — LOW (ref 11.5–15.5)
IMM GRANULOCYTES NFR BLD AUTO: 3.1 % — HIGH (ref 0–0.9)
IMM GRANULOCYTES NFR BLD AUTO: 3.1 % — HIGH (ref 0–0.9)
IMM GRANULOCYTES NFR BLD AUTO: 3.6 % — HIGH (ref 0–0.9)
IMM GRANULOCYTES NFR BLD AUTO: 3.6 % — HIGH (ref 0–0.9)
LACTATE SERPL-SCNC: 1.6 MMOL/L — SIGNIFICANT CHANGE UP (ref 0.5–2)
LACTATE SERPL-SCNC: 1.6 MMOL/L — SIGNIFICANT CHANGE UP (ref 0.5–2)
LYMPHOCYTES # BLD AUTO: 16.4 % — SIGNIFICANT CHANGE UP (ref 13–44)
LYMPHOCYTES # BLD AUTO: 16.4 % — SIGNIFICANT CHANGE UP (ref 13–44)
LYMPHOCYTES # BLD AUTO: 2.88 K/UL — SIGNIFICANT CHANGE UP (ref 1–3.3)
LYMPHOCYTES # BLD AUTO: 2.88 K/UL — SIGNIFICANT CHANGE UP (ref 1–3.3)
LYMPHOCYTES # BLD AUTO: 20.1 % — SIGNIFICANT CHANGE UP (ref 13–44)
LYMPHOCYTES # BLD AUTO: 20.1 % — SIGNIFICANT CHANGE UP (ref 13–44)
LYMPHOCYTES # BLD AUTO: 3.62 K/UL — HIGH (ref 1–3.3)
LYMPHOCYTES # BLD AUTO: 3.62 K/UL — HIGH (ref 1–3.3)
MAGNESIUM SERPL-MCNC: 1.8 MG/DL — SIGNIFICANT CHANGE UP (ref 1.6–2.6)
MAGNESIUM SERPL-MCNC: 1.8 MG/DL — SIGNIFICANT CHANGE UP (ref 1.6–2.6)
MANUAL SMEAR VERIFICATION: SIGNIFICANT CHANGE UP
MANUAL SMEAR VERIFICATION: SIGNIFICANT CHANGE UP
MCHC RBC-ENTMCNC: 28.5 PG — SIGNIFICANT CHANGE UP (ref 27–34)
MCHC RBC-ENTMCNC: 28.5 PG — SIGNIFICANT CHANGE UP (ref 27–34)
MCHC RBC-ENTMCNC: 29.1 PG — SIGNIFICANT CHANGE UP (ref 27–34)
MCHC RBC-ENTMCNC: 29.1 PG — SIGNIFICANT CHANGE UP (ref 27–34)
MCHC RBC-ENTMCNC: 29.5 PG — SIGNIFICANT CHANGE UP (ref 27–34)
MCHC RBC-ENTMCNC: 29.5 PG — SIGNIFICANT CHANGE UP (ref 27–34)
MCHC RBC-ENTMCNC: 29.6 PG — SIGNIFICANT CHANGE UP (ref 27–34)
MCHC RBC-ENTMCNC: 29.6 PG — SIGNIFICANT CHANGE UP (ref 27–34)
MCHC RBC-ENTMCNC: 34.1 GM/DL — SIGNIFICANT CHANGE UP (ref 32–36)
MCHC RBC-ENTMCNC: 34.1 GM/DL — SIGNIFICANT CHANGE UP (ref 32–36)
MCHC RBC-ENTMCNC: 35 GM/DL — SIGNIFICANT CHANGE UP (ref 32–36)
MCHC RBC-ENTMCNC: 35 GM/DL — SIGNIFICANT CHANGE UP (ref 32–36)
MCHC RBC-ENTMCNC: 35.1 GM/DL — SIGNIFICANT CHANGE UP (ref 32–36)
MCHC RBC-ENTMCNC: 35.1 GM/DL — SIGNIFICANT CHANGE UP (ref 32–36)
MCHC RBC-ENTMCNC: 35.2 GM/DL — SIGNIFICANT CHANGE UP (ref 32–36)
MCHC RBC-ENTMCNC: 35.2 GM/DL — SIGNIFICANT CHANGE UP (ref 32–36)
MCV RBC AUTO: 82.9 FL — SIGNIFICANT CHANGE UP (ref 80–100)
MCV RBC AUTO: 82.9 FL — SIGNIFICANT CHANGE UP (ref 80–100)
MCV RBC AUTO: 83.7 FL — SIGNIFICANT CHANGE UP (ref 80–100)
MCV RBC AUTO: 83.7 FL — SIGNIFICANT CHANGE UP (ref 80–100)
MCV RBC AUTO: 83.9 FL — SIGNIFICANT CHANGE UP (ref 80–100)
MCV RBC AUTO: 83.9 FL — SIGNIFICANT CHANGE UP (ref 80–100)
MCV RBC AUTO: 84.2 FL — SIGNIFICANT CHANGE UP (ref 80–100)
MCV RBC AUTO: 84.2 FL — SIGNIFICANT CHANGE UP (ref 80–100)
MICROCYTES BLD QL: SLIGHT — SIGNIFICANT CHANGE UP
MICROCYTES BLD QL: SLIGHT — SIGNIFICANT CHANGE UP
MONOCYTES # BLD AUTO: 1.33 K/UL — HIGH (ref 0–0.9)
MONOCYTES # BLD AUTO: 1.33 K/UL — HIGH (ref 0–0.9)
MONOCYTES # BLD AUTO: 1.38 K/UL — HIGH (ref 0–0.9)
MONOCYTES # BLD AUTO: 1.38 K/UL — HIGH (ref 0–0.9)
MONOCYTES NFR BLD AUTO: 7.6 % — SIGNIFICANT CHANGE UP (ref 2–14)
MONOCYTES NFR BLD AUTO: 7.6 % — SIGNIFICANT CHANGE UP (ref 2–14)
MONOCYTES NFR BLD AUTO: 7.7 % — SIGNIFICANT CHANGE UP (ref 2–14)
MONOCYTES NFR BLD AUTO: 7.7 % — SIGNIFICANT CHANGE UP (ref 2–14)
MRSA PCR RESULT.: SIGNIFICANT CHANGE UP
MRSA PCR RESULT.: SIGNIFICANT CHANGE UP
NEUTROPHILS # BLD AUTO: 11.85 K/UL — HIGH (ref 1.8–7.4)
NEUTROPHILS # BLD AUTO: 11.85 K/UL — HIGH (ref 1.8–7.4)
NEUTROPHILS # BLD AUTO: 12.62 K/UL — HIGH (ref 1.8–7.4)
NEUTROPHILS # BLD AUTO: 12.62 K/UL — HIGH (ref 1.8–7.4)
NEUTROPHILS NFR BLD AUTO: 65.9 % — SIGNIFICANT CHANGE UP (ref 43–77)
NEUTROPHILS NFR BLD AUTO: 65.9 % — SIGNIFICANT CHANGE UP (ref 43–77)
NEUTROPHILS NFR BLD AUTO: 71.8 % — SIGNIFICANT CHANGE UP (ref 43–77)
NEUTROPHILS NFR BLD AUTO: 71.8 % — SIGNIFICANT CHANGE UP (ref 43–77)
OVALOCYTES BLD QL SMEAR: SLIGHT — SIGNIFICANT CHANGE UP
OVALOCYTES BLD QL SMEAR: SLIGHT — SIGNIFICANT CHANGE UP
PHOSPHATE SERPL-MCNC: 3.7 MG/DL — SIGNIFICANT CHANGE UP (ref 2.4–4.7)
PHOSPHATE SERPL-MCNC: 3.7 MG/DL — SIGNIFICANT CHANGE UP (ref 2.4–4.7)
PLAT MORPH BLD: NORMAL — SIGNIFICANT CHANGE UP
PLAT MORPH BLD: NORMAL — SIGNIFICANT CHANGE UP
PLATELET # BLD AUTO: 206 K/UL — SIGNIFICANT CHANGE UP (ref 150–400)
PLATELET # BLD AUTO: 206 K/UL — SIGNIFICANT CHANGE UP (ref 150–400)
PLATELET # BLD AUTO: 211 K/UL — SIGNIFICANT CHANGE UP (ref 150–400)
PLATELET # BLD AUTO: 211 K/UL — SIGNIFICANT CHANGE UP (ref 150–400)
PLATELET # BLD AUTO: 254 K/UL — SIGNIFICANT CHANGE UP (ref 150–400)
PLATELET # BLD AUTO: 254 K/UL — SIGNIFICANT CHANGE UP (ref 150–400)
PLATELET # BLD AUTO: 262 K/UL — SIGNIFICANT CHANGE UP (ref 150–400)
PLATELET # BLD AUTO: 262 K/UL — SIGNIFICANT CHANGE UP (ref 150–400)
POIKILOCYTOSIS BLD QL AUTO: SLIGHT — SIGNIFICANT CHANGE UP
POIKILOCYTOSIS BLD QL AUTO: SLIGHT — SIGNIFICANT CHANGE UP
POLYCHROMASIA BLD QL SMEAR: SLIGHT — SIGNIFICANT CHANGE UP
POLYCHROMASIA BLD QL SMEAR: SLIGHT — SIGNIFICANT CHANGE UP
POTASSIUM SERPL-MCNC: 3.4 MMOL/L — LOW (ref 3.5–5.3)
POTASSIUM SERPL-SCNC: 3.4 MMOL/L — LOW (ref 3.5–5.3)
PROT SERPL-MCNC: 4.1 G/DL — LOW (ref 6.6–8.7)
PROT SERPL-MCNC: 4.1 G/DL — LOW (ref 6.6–8.7)
RBC # BLD: 2.34 M/UL — LOW (ref 3.8–5.2)
RBC # BLD: 2.34 M/UL — LOW (ref 3.8–5.2)
RBC # BLD: 2.63 M/UL — LOW (ref 3.8–5.2)
RBC # BLD: 2.63 M/UL — LOW (ref 3.8–5.2)
RBC # BLD: 2.74 M/UL — LOW (ref 3.8–5.2)
RBC # BLD: 2.74 M/UL — LOW (ref 3.8–5.2)
RBC # BLD: 2.75 M/UL — LOW (ref 3.8–5.2)
RBC # BLD: 2.75 M/UL — LOW (ref 3.8–5.2)
RBC # FLD: 14.3 % — SIGNIFICANT CHANGE UP (ref 10.3–14.5)
RBC # FLD: 14.3 % — SIGNIFICANT CHANGE UP (ref 10.3–14.5)
RBC # FLD: 14.4 % — SIGNIFICANT CHANGE UP (ref 10.3–14.5)
RBC # FLD: 14.4 % — SIGNIFICANT CHANGE UP (ref 10.3–14.5)
RBC # FLD: 14.7 % — HIGH (ref 10.3–14.5)
RBC BLD AUTO: ABNORMAL
RBC BLD AUTO: ABNORMAL
S AUREUS DNA NOSE QL NAA+PROBE: SIGNIFICANT CHANGE UP
S AUREUS DNA NOSE QL NAA+PROBE: SIGNIFICANT CHANGE UP
SODIUM SERPL-SCNC: 140 MMOL/L — SIGNIFICANT CHANGE UP (ref 135–145)
SODIUM SERPL-SCNC: 140 MMOL/L — SIGNIFICANT CHANGE UP (ref 135–145)
SODIUM SERPL-SCNC: 142 MMOL/L — SIGNIFICANT CHANGE UP (ref 135–145)
SODIUM SERPL-SCNC: 142 MMOL/L — SIGNIFICANT CHANGE UP (ref 135–145)
WBC # BLD: 17.56 K/UL — HIGH (ref 3.8–10.5)
WBC # BLD: 17.56 K/UL — HIGH (ref 3.8–10.5)
WBC # BLD: 17.97 K/UL — HIGH (ref 3.8–10.5)
WBC # BLD: 17.97 K/UL — HIGH (ref 3.8–10.5)
WBC # BLD: 18.79 K/UL — HIGH (ref 3.8–10.5)
WBC # BLD: 18.79 K/UL — HIGH (ref 3.8–10.5)
WBC # BLD: 19.58 K/UL — HIGH (ref 3.8–10.5)
WBC # BLD: 19.58 K/UL — HIGH (ref 3.8–10.5)
WBC # FLD AUTO: 17.56 K/UL — HIGH (ref 3.8–10.5)
WBC # FLD AUTO: 17.56 K/UL — HIGH (ref 3.8–10.5)
WBC # FLD AUTO: 17.97 K/UL — HIGH (ref 3.8–10.5)
WBC # FLD AUTO: 17.97 K/UL — HIGH (ref 3.8–10.5)
WBC # FLD AUTO: 18.79 K/UL — HIGH (ref 3.8–10.5)
WBC # FLD AUTO: 18.79 K/UL — HIGH (ref 3.8–10.5)
WBC # FLD AUTO: 19.58 K/UL — HIGH (ref 3.8–10.5)
WBC # FLD AUTO: 19.58 K/UL — HIGH (ref 3.8–10.5)

## 2023-10-26 PROCEDURE — 99233 SBSQ HOSP IP/OBS HIGH 50: CPT

## 2023-10-26 PROCEDURE — 99222 1ST HOSP IP/OBS MODERATE 55: CPT

## 2023-10-26 RX ORDER — ZINC SULFATE TAB 220 MG (50 MG ZINC EQUIVALENT) 220 (50 ZN) MG
0 TAB ORAL
Refills: 0 | DISCHARGE

## 2023-10-26 RX ORDER — SOD SULF/SODIUM/NAHCO3/KCL/PEG
4000 SOLUTION, RECONSTITUTED, ORAL ORAL ONCE
Refills: 0 | Status: COMPLETED | OUTPATIENT
Start: 2023-10-26 | End: 2023-10-26

## 2023-10-26 RX ADMIN — PIPERACILLIN AND TAZOBACTAM 25 GRAM(S): 4; .5 INJECTION, POWDER, LYOPHILIZED, FOR SOLUTION INTRAVENOUS at 08:38

## 2023-10-26 RX ADMIN — PANTOPRAZOLE SODIUM 40 MILLIGRAM(S): 20 TABLET, DELAYED RELEASE ORAL at 05:18

## 2023-10-26 RX ADMIN — Medication 4000 MILLILITER(S): at 18:37

## 2023-10-26 RX ADMIN — Medication 100 MILLIEQUIVALENT(S): at 01:12

## 2023-10-26 RX ADMIN — Medication 12.5 MILLIGRAM(S): at 17:49

## 2023-10-26 RX ADMIN — CHLORHEXIDINE GLUCONATE 1 APPLICATION(S): 213 SOLUTION TOPICAL at 11:47

## 2023-10-26 RX ADMIN — PIPERACILLIN AND TAZOBACTAM 25 GRAM(S): 4; .5 INJECTION, POWDER, LYOPHILIZED, FOR SOLUTION INTRAVENOUS at 17:50

## 2023-10-26 RX ADMIN — PANTOPRAZOLE SODIUM 40 MILLIGRAM(S): 20 TABLET, DELAYED RELEASE ORAL at 17:49

## 2023-10-26 RX ADMIN — PIPERACILLIN AND TAZOBACTAM 25 GRAM(S): 4; .5 INJECTION, POWDER, LYOPHILIZED, FOR SOLUTION INTRAVENOUS at 01:13

## 2023-10-26 NOTE — PROGRESS NOTE ADULT - SUBJECTIVE AND OBJECTIVE BOX
Chief Complaint:  Patient is a 68y old  Female who presents with a chief complaint of gib (26 Oct 2023 09:25)      HPI/ 24 hr events: Pt initially presented with dark red blood with clots with negative CTA in ED on 10/24. She was prepped for colonoscopy yesterday, however upon arrival to Endoscopy suite was found to be tachycardic to 130s with active large volume rectal hemorrhage. Her procedures were cancelled and she was immediately taken over to radiology for CTA. CTA was positive for active extravasation from transverse colon diverticula and patient was prepped for IR embolization, however unfortunately bleeding stopped prior to embolization and no intervention was performed. She was transferred to MICU for monitoring and had transfusion of pRBC while in Radiology, an additional 2u in MICU overnight. Per RN reports, she continued to have multiple episodes of BRBPR overnight. Hgb prior to 4am transfusion was 7.5 > 8.0 post-transfusion. This am, she is mildly tachycardic to low 100s, with normal BP. There is a small amount of dark bloody bm on deniz at time of GI evaluation, no bright red blood.         REVIEW OF SYSTEMS:   General: Negative  HEENT: Negative  CV: Negative  Respiratory: Negative  GI: See HPI  : Negative  MSK: Negative  Hematologic: Negative  Skin: Negative    MEDICATIONS:   MEDICATIONS  (STANDING):  chlorhexidine 2% Cloths 1 Application(s) Topical daily  metoprolol tartrate 12.5 milliGRAM(s) Oral every 12 hours  pantoprazole  Injectable 40 milliGRAM(s) IV Push every 12 hours  piperacillin/tazobactam IVPB.. 3.375 Gram(s) IV Intermittent every 8 hours    MEDICATIONS  (PRN):  acetaminophen     Tablet .. 650 milliGRAM(s) Oral every 6 hours PRN Temp greater or equal to 38C (100.4F), Mild Pain (1 - 3)  ondansetron Injectable 4 milliGRAM(s) IV Push every 6 hours PRN Nausea and/or Vomiting      Packed Red Cells Order:  1 Unit  Indication: Anemia due to Active Hemorrhage - Medical Conditions e.  Infuse Unit : 30 Minutes (10-26-23 @ 02:37)  Packed Red Cells Order:  1 Unit  Indication: Anemia due to Active Hemorrhage - Medical Conditions e.  Infuse Unit : 30 Minutes (10-25-23 @ 21:33)  Plasma Order:  Unit 1, STAT   Indication: Active Bleeding with suspected coagulopathy (in the absence of warfarin)    Infuse Unit : As Fast As Possible (10-25-23 @ 15:33)  Plasma Order:  Unit 1, STAT   Indication: Other: ative GI bleed    Infuse Unit : As Fast As Possible (10-25-23 @ 15:29)  Packed Red Cells Order:  1 Unit  Indication: Anemia due to Active Hemorrhage - Medical Conditions e.  Infuse Unit : As Fast As Possible (10-25-23 @ 14:59)  Packed Red Cells Order:  1 Unit  Indication: Anemia due to Active Hemorrhage - Medical Conditions e.  Infuse Unit : 3 Hours (10-25-23 @ 13:50)  Packed Red Cells Order:  1 Unit  Indication: Anemia due to Active Hemorrhage - Medical Conditions e.  Infuse Unit : 2.5 Hours (10-25-23 @ 06:31)  Packed Red Cells Order:  1 Unit  Indication: Anemia due to Active Hemorrhage - Medical Conditions e.  Infuse Unit : 3 Hours (10-24-23 @ 14:50)        DIET:  Diet, NPO after Midnight:      NPO Start Date: 24-Oct-2023,   NPO Start Time: 23:59 (10-24-23 @ 14:43) [Active]  Diet, Clear Liquid (10-24-23 @ 14:28) [Active]          ALLERGIES:   Allergies    sulfa drugs (Hives)    Intolerances        VITAL SIGNS:   Vital Signs Last 24 Hrs  T(C): 36.4 (26 Oct 2023 08:03), Max: 37 (25 Oct 2023 12:00)  T(F): 97.6 (26 Oct 2023 08:03), Max: 98.6 (25 Oct 2023 12:00)  HR: 114 (26 Oct 2023 09:00) (94 - 142)  BP: 123/68 (26 Oct 2023 09:00) (96/62 - 161/67)  BP(mean): 82 (26 Oct 2023 09:00) (61 - 109)  RR: 16 (26 Oct 2023 09:00) (11 - 27)  SpO2: 100% (26 Oct 2023 09:00) (99% - 100%)    Parameters below as of 26 Oct 2023 08:00  Patient On (Oxygen Delivery Method): nasal cannula  O2 Flow (L/min): 2    I&O's Summary    25 Oct 2023 07:01  -  26 Oct 2023 07:00  --------------------------------------------------------  IN: 700 mL / OUT: 600 mL / NET: 100 mL        PHYSICAL EXAM:   GENERAL:  No acute distress  HEENT:  NC/AT, conjunctiva clear, sclera anicteric  CHEST:  No increased effort  HEART:  Regular rate  ABDOMEN:  Soft, non-tender, non-distended, normoactive bowel sounds, no rebound or guarding  EXTREMITIES: No edema  SKIN:  Warm, dry  NEURO:  Calm, cooperative    LABS:                        8.0    19.58 )-----------( 254      ( 26 Oct 2023 09:00 )             22.8     Hemoglobin: 8.0 g/dL (10-26-23 @ 09:00)  Hemoglobin: 7.5 g/dL (10-26-23 @ 02:05)  Hemoglobin: 7.4 g/dL (10-25-23 @ 19:00)  Hemoglobin: 8.4 g/dL (10-25-23 @ 11:08)  Hemoglobin: 7.2 g/dL (10-25-23 @ 04:45)  Hemoglobin: 7.8 g/dL (10-25-23 @ 01:30)  Hemoglobin: 8.5 g/dL (10-24-23 @ 23:25)  Hemoglobin: 8.5 g/dL (10-24-23 @ 14:54)  Hemoglobin: 10.9 g/dL (10-24-23 @ 09:55)    10-26    142  |  109<H>  |  21.5<H>  ----------------------------<  144<H>  3.4<L>   |  20.0<L>  |  0.69    Ca    7.6<L>      26 Oct 2023 02:05  Phos  3.7     10-26  Mg     1.8     10-26    TPro  4.7<L>  /  Alb  2.9<L>  /  TBili  0.4  /  DBili  x   /  AST  14  /  ALT  8   /  AlkPhos  62  10-25    LIVER FUNCTIONS - ( 25 Oct 2023 19:00 )  Alb: 2.9 g/dL / Pro: 4.7 g/dL / ALK PHOS: 62 U/L / ALT: 8 U/L / AST: 14 U/L / GGT: x             PT/INR - ( 25 Oct 2023 19:00 )   PT: 13.5 sec;   INR: 1.22 ratio         PTT - ( 25 Oct 2023 19:00 )  PTT:26.0 sec        Hepatitis C Virus S/CO Ratio: 0.06 S/CO (10-25-23 @ 04:45)                                  RADIOLOGY & ADDITIONAL STUDIES:      ACC: [id number] EXAM:  CT ABDOMEN AND PELVIS WAW IC   ORDERED BY: ANTONIO CARVAJAL     PROCEDURE DATE:  10/25/2023          INTERPRETATION:  CLINICAL INFORMATION: Rectal bleeding    ADDITIONAL CLINICAL INFORMATION: Other, Non-specified    COMPARISON: None.    CONTRAST/COMPLICATIONS:  IV Contrast: Omnipaque 350  Oral Contrast: NONE  Complications: None reported at time of study completion    PROCEDURE:  CT of the Abdomen and Pelvis was performed.  Precontrast, Arterial and Delayed phases were performed.  Sagittal and coronal reformats were performed.    FINDINGS:  LOWER CHEST: Lung bases are clear. Mitral valvular calcifications.    LIVER: Right liver lobe hepatic cyst and subcentimeter hypodensities too   small to characterize.  BILE DUCTS: Normal caliber. CBD measures 1.2 cm postcholecystectomy.  GALLBLADDER: Cholecystectomy.  SPLEEN: Within normal limits.  PANCREAS: Within normal limits.  ADRENALS: Within normal limits.  KIDNEYS/URETERS: No renal stones or hydronephrosis. Symmetric homogeneous   enhancement.    BLADDER: Within normal limits.  REPRODUCTIVE ORGANS: Uterus myomatosis.    BOWEL: There is active contrast extravasation into the colonic lumen from   a left transverse colon diverticulum, best seen on image 8:36-39.   Extensive colonic diverticulosis in the descending and sigmoid colon. Few   diverticula demonstrate subtle inflammation, at the transition of the   descending to sigmoid colon. Increased mucosal enhancement in the   rectosigmoid suggest mild inflammation. Uncomplicated paraduodenal   diverticulum.  Complex intraluminal fluid in the transverse colon, ascending colon and   in the rectosigmoid. No bowel obstruction. Appendix is normal.  PERITONEUM: No ascites. No pneumoperitoneum.  VESSELS: Atherosclerotic changes.  RETROPERITONEUM/LYMPH NODES: No lymphadenopathy.  ABDOMINAL WALL: Anterior abdominal wall periumbilical hernia containing   fat. Small bilateral inguinal fat-containing hernia.  BONES: Degenerative changes.    IMPRESSION:  Active GI bleed in the left transverse colon from a diverticulum.   Consider IR consultation.    Extensive colonic diverticulosis in the descending and sigmoid colon with   additional subtle acute diverticulitis in the proximal sigmoid colon.    Findings were discussed with ADALID Roberts by Dr. Card    with   read back confirmation at 3:28;pm, 10/25/2023    --- End of Report ---            MARGAUX CARD MD; Attending Radiologist  This document has been electronically signed. Oct 25 2023  3:44PM  10-25-23 @ 15:23    ACC: [id number] EXAM:  CT ABDOMEN AND PELVIS WAW IC   ORDERED BY: JORGE RIVERA     PROCEDURE DATE:  10/24/2023          INTERPRETATION:  CLINICAL INFORMATION: Abdominal pain, acute,   nonlocalized. Rectal bleeding. Diarrhea.    ADDITIONAL CLINICALINFORMATION: Other, Non-specified    COMPARISON: None.    CONTRAST/COMPLICATIONS:  IV Contrast: Omnipaque 350  90 cc administered   10 cc discarded  Oral Contrast: NONE  Complications: None reported at time of study completion    PROCEDURE:  CT of the Abdomen and Pelvis was performed.  Precontrast, Arterial and Delayed phases were performed.  Sagittal and coronal reformats were performed.    FINDINGS:    LOWER CHEST: No visualized pleural effusion. Small hiatal hernia.    LIVER: Liver size withinnormal limits, 17 cm in craniocaudal dimension.   Inferior right hepatic lobe cyst. Few small hepatic hypodensities, too   small to characterize.  BILE DUCTS: Common bile duct prominent size measuring 11 mm may be due to   postcholecystectomy status.Correlate with LFTs.  GALLBLADDER: Cholecystectomy.  SPLEEN: Spleen size within normal limits  PANCREAS: No acute peripancreatic inflammation  ADRENALS: Unremarkable  KIDNEYS/URETERS: No hydronephrosis. Subcentimeter renal hypodensities are   too small to characterize.    BLADDER: Minimally distended.  REPRODUCTIVE ORGANS: Myomatous uterus. Uterus and adnexa are otherwise   suboptimally characterized on CT.    BOWEL: Small hiatal hernia. Stomach is underdistended. Small   periampullary duodenal diverticulum. No small bowel distention. Appendix   is not obstructed. Mild stool burden of the colon and overall under   distention limits evaluation of the colonic mucosa. Extensive colonic   diverticulosis. Mild long segment right-sided pericolonic inflammatory   changes could reflect colitis or mild diverticulitis. No active   extravasation of contrast is identified.  PERITONEUM: No ascites.  VESSELS: No abdominal aortic aneurysm. Atheromatous changes. Soft tissue   surrounding the celiac artery, of unclear significance, measuring 12 x 7   mm on image 23 series 11.  RETROPERITONEUM/LYMPH NODES: Small volume nodes.  ABDOMINAL WALL: Small right paracentral fat containing ventral hernia.   Small fat-containing inguinal hernias.  BONES: Degenerative changes and osseous demineralization.    IMPRESSION:    No active extravasation of contrast into the bowel is identified.   Correlate with hemodynamics and hematocrit to guide further management.   Extensive colonic diverticulosis. Mild long segment right-sided   pericolonic inflammatory changes could reflect colitis or mild   diverticulitis.    Soft tissue surrounding the celiac artery, of unclear significance,   measuring 12 x 7 mm on image 23 series 11. Correlate with any prior   available imaging to evaluate for stability over time, versus abdominal   MRI for further characterization. Few small hepatic hypodensities, too   small to characterize.    Common bile duct prominent size (11 mm) may be due to postcholecystectomy   status. Correlate with LFTs.    --- End of Report ---            LUL EDDY M.D., ATTENDING RADIOLOGIST  This document has been electronically signed. Oct 24 2023  1:19PM  10-24-23 @ 12:08

## 2023-10-26 NOTE — PROGRESS NOTE ADULT - ASSESSMENT
69 y/o F with a h/o HTN, GERD, Barretts esophagus, recent left TKR, with:    # LGIB, diverticular  # Acute blood loss anemia  # SOB, respiratory alkalosis  # Hypokalemia    Continues to have numerous bloody BMs with clots. Tachycardic. Poor response in H/H to PRBC transfusions.    - transfused 1u PRBC due to recurrent bloody BMs in setting of Hgb 7.4  - repeat Hgb is 7.5, will transfuse another 1u PRBC now  - unfortunately IR angiogram did not show active bleeding and no intervention was performed  - will await further GI recommendations, colonoscopy?  - bowel resection would be last resort  - monitor hemodynamics closely, persistent tachycardia, at high risk for life threatening decompensation, maintain a MAP > 65  - transient SOB/resp alkalosis likely related to panic attack, stat CXR was clear, moving air well  - replete potassium (GI losses + intracellular shift in alkalotic environment)  - continue empiric Zosyn given finding of subtle acute diverticulitis on CT A/P      Case discussed with MICU physician, Dr. Jolley.

## 2023-10-26 NOTE — DIETITIAN INITIAL EVALUATION ADULT - NUTRITIONGOAL OUTCOME1
Pt will meet > 75% est nutrient needs via oral diet and oral nutrition supplementation as tolerated

## 2023-10-26 NOTE — DIETITIAN INITIAL EVALUATION ADULT - ORAL INTAKE PTA/DIET HISTORY
Pt sleeping soundly at time of visit this AM, NPO at that time x 2 days. Chart reviewed. GI following for GI bleed, pt now advanced to clear liquid diet this afternoon. Pending colonoscopy tomorrow per notes. RD to follow up at more feasible time for interview, appropriate diet education and to monitor tolerance of po.

## 2023-10-26 NOTE — DIETITIAN INITIAL EVALUATION ADULT - OTHER INFO
69 yo F with PMH of diverticulosis, thyroid nodule, unilateral primary osteoarthritis of L knee, GERD, HTN who is currently admitted with recurrent diverticular bleed. She was planned for colonoscopy yesterday, but procedures were cancelled and she was taken to IR when she was noted to be hemodynamically unstable with active rectal hemorrhage. CTA positive for transverse colon diverticular bleed, but no intervention was performed due to bleeding stopped. S/p several units pRBC. Tachycardia and BP improved with small volume dark bloody bm. Plan for repeat prep and colonoscopy tomorrow as long as patient remains hemodynamically stable. Started on clear liquid diet.

## 2023-10-26 NOTE — DIETITIAN INITIAL EVALUATION ADULT - PERTINENT MEDS FT
MEDICATIONS  (STANDING):  chlorhexidine 2% Cloths 1 Application(s) Topical daily  metoprolol tartrate 12.5 milliGRAM(s) Oral every 12 hours  pantoprazole  Injectable 40 milliGRAM(s) IV Push every 12 hours  piperacillin/tazobactam IVPB.. 3.375 Gram(s) IV Intermittent every 8 hours    MEDICATIONS  (PRN):  acetaminophen     Tablet .. 650 milliGRAM(s) Oral every 6 hours PRN Temp greater or equal to 38C (100.4F), Mild Pain (1 - 3)  ondansetron Injectable 4 milliGRAM(s) IV Push every 6 hours PRN Nausea and/or Vomiting

## 2023-10-26 NOTE — DIETITIAN INITIAL EVALUATION ADULT - PERTINENT LABORATORY DATA
10-26    142  |  109<H>  |  21.5<H>  ----------------------------<  144<H>  3.4<L>   |  20.0<L>  |  0.69    Ca    7.6<L>      26 Oct 2023 02:05  Phos  3.7     10-26  Mg     1.8     10-26    TPro  4.7<L>  /  Alb  2.9<L>  /  TBili  0.4  /  DBili  x   /  AST  14  /  ALT  8   /  AlkPhos  62  10-25  A1C with Estimated Average Glucose Result: 6.1 % (09-18-23 @ 17:53)

## 2023-10-26 NOTE — DIETITIAN INITIAL EVALUATION ADULT - ADD RECOMMEND
Monitor electrolytes, replete as needed  Encourage Gelatein and Ensure Clear consumption on CLD for supplementation  Daily weights to trend  Monitor for further diet advancement

## 2023-10-26 NOTE — PROGRESS NOTE ADULT - SUBJECTIVE AND OBJECTIVE BOX
Patient is a 68y old  Female who presents with a chief complaint of gib (25 Oct 2023 16:52)      BRIEF HOSPITAL COURSE: 67 y/o F with a h/o HTN, GERD, Barretts esophagus, recent left TKR, admitted on 10/24 with complaints of rectal bleeding. Initial CTA abdomen did not demonstrate active bleeding, showed long segment R sided pericolonic inflammatory changes which could reflect colitis/diverticulitis. Patient experienced recurrent bloody BMs with severe ABLA. Repeat CTA A/P revealed active bleeding in the left transverse colon from a diverticulum. Patient subsequently underwent angiogram with IR which no longer revealed active bleeding and no intervention was performed.    Events last 24 hours: She continues to have multiple bloody BMs with clots overnight into this morning. Transfused another 2u PRBC for recurrent bleeding and poor response in H/H to blood product. Transiently developed tachypnea and respiratory alkalosis (7.61/<20) due to suspected panic attack.        PAST MEDICAL & SURGICAL HISTORY:  HTN (hypertension)      GERD (gastroesophageal reflux disease)      Unilateral primary osteoarthritis, left knee      Thyroid nodule      Prediabetes      H/O oral surgery      H/O tubal ligation      History of cholecystectomy      S/P excision of lipoma          Review of Systems:  CONSTITUTIONAL: No fever, chills, or fatigue  EYES: No eye pain, visual disturbances, or discharge  ENMT:  No difficulty hearing, tinnitus, vertigo; No sinus or throat pain  NECK: No pain or stiffness  RESPIRATORY: No cough, wheezing, chills or hemoptysis; (+) shortness of breath  CARDIOVASCULAR: No chest pain, palpitations, dizziness, or leg swelling  GASTROINTESTINAL: No abdominal or epigastric pain. No nausea, vomiting, or hematemesis; (+)hematochezia.  GENITOURINARY: No dysuria, frequency, hematuria, or incontinence  NEUROLOGICAL: No headaches, memory loss, loss of strength, numbness, or tremors  SKIN: No itching, burning, rashes, or lesions   MUSCULOSKELETAL: No joint pain or swelling; No muscle, back, or extremity pain  PSYCHIATRIC: No depression, anxiety, mood swings, or difficulty sleeping      Medications:  piperacillin/tazobactam IVPB.. 3.375 Gram(s) IV Intermittent every 8 hours  metoprolol tartrate 12.5 milliGRAM(s) Oral every 12 hours  acetaminophen     Tablet .. 650 milliGRAM(s) Oral every 6 hours PRN  ondansetron Injectable 4 milliGRAM(s) IV Push every 6 hours PRN  pantoprazole  Injectable 40 milliGRAM(s) IV Push every 12 hours  chlorhexidine 2% Cloths 1 Application(s) Topical daily        Mode: AC/ CMV (Assist Control/ Continuous Mandatory Ventilation)  RR (machine): 12  TV (machine): 450  FiO2: 50  PEEP: 6  ITime: 0.9  MAP: 10  PIP: 20      ICU Vital Signs Last 24 Hrs  T(C): 36.5 (25 Oct 2023 23:51), Max: 37 (25 Oct 2023 07:13)  T(F): 97.7 (25 Oct 2023 23:51), Max: 98.6 (25 Oct 2023 07:13)  HR: 142 (26 Oct 2023 02:30) (87 - 142)  BP: 118/68 (26 Oct 2023 02:30) (96/62 - 161/67)  BP(mean): 84 (26 Oct 2023 02:30) (61 - 109)  ABP: --  ABP(mean): --  RR: 16 (26 Oct 2023 02:30) (11 - 25)  SpO2: 100% (26 Oct 2023 02:30) (100% - 100%)    O2 Parameters below as of 26 Oct 2023 02:00  Patient On (Oxygen Delivery Method): nasal cannula  O2 Flow (L/min): 2          ABG - ( 25 Oct 2023 21:55 )  pH, Arterial: 7.610 pH, Blood: x     /  pCO2: <20   /  pO2: 141   / HCO3: 19    / Base Excess: -2.3  /  SaO2: 100.0               I&O's Detail    24 Oct 2023 07:01  -  25 Oct 2023 07:00  --------------------------------------------------------  IN:    sodium chloride 0.9%: 700 mL  Total IN: 700 mL    OUT:  Total OUT: 0 mL    Total NET: 700 mL      25 Oct 2023 07:01  -  26 Oct 2023 02:38  --------------------------------------------------------  IN:    PRBCs (Packed Red Blood Cells): 350 mL  Total IN: 350 mL    OUT:    Propofol: 0 mL    Voided (mL): 600 mL  Total OUT: 600 mL    Total NET: -250 mL            LABS:                        7.5    18.79 )-----------( 262      ( 26 Oct 2023 02:05 )             22.0     10-25    139  |  104  |  18.5  ----------------------------<  135<H>  3.3<L>   |  22.0  |  0.56    Ca    7.9<L>      25 Oct 2023 19:00    TPro  4.7<L>  /  Alb  2.9<L>  /  TBili  0.4  /  DBili  x   /  AST  14  /  ALT  8   /  AlkPhos  62  10-25          CAPILLARY BLOOD GLUCOSE        PT/INR - ( 25 Oct 2023 19:00 )   PT: 13.5 sec;   INR: 1.22 ratio         PTT - ( 25 Oct 2023 19:00 )  PTT:26.0 sec  Urinalysis Basic - ( 25 Oct 2023 19:00 )    Color: x / Appearance: x / SG: x / pH: x  Gluc: 135 mg/dL / Ketone: x  / Bili: x / Urobili: x   Blood: x / Protein: x / Nitrite: x   Leuk Esterase: x / RBC: x / WBC x   Sq Epi: x / Non Sq Epi: x / Bacteria: x      CULTURES:        Physical Examination:    General: transient respiratory distress, now resting comfortably    HEENT: Pupils equal, reactive to light.  Symmetric.    PULM: Clear to auscultation bilaterally, no significant sputum production    CVS: tachycardic, reg rhythm, no murmurs, rubs, or gallops    ABD: Soft, nondistended, nontender, normoactive bowel sounds, no masses    EXT: No edema, nontender    SKIN: Warm and pale    NEURO: A&Ox3, strength 5/5 all extremities, cranial nerves grossly intact, no focal deficits        RADIOLOGY:     < from: CT Abdomen and Pelvis w/wo IV Cont (10.25.23 @ 15:23) >  FINDINGS:  LOWER CHEST: Lung bases are clear. Mitral valvular calcifications.    LIVER: Right liver lobe hepatic cyst and subcentimeter hypodensities too   small to characterize.  BILE DUCTS: Normal caliber. CBD measures 1.2 cm postcholecystectomy.  GALLBLADDER: Cholecystectomy.  SPLEEN: Within normal limits.  PANCREAS: Within normal limits.  ADRENALS: Within normal limits.  KIDNEYS/URETERS: No renal stones or hydronephrosis. Symmetric homogeneous   enhancement.    BLADDER: Within normal limits.  REPRODUCTIVE ORGANS: Uterus myomatosis.    BOWEL: There is active contrast extravasation into the colonic lumen from   a left transverse colon diverticulum, best seen on image 8:36-39.   Extensive colonic diverticulosis in the descending and sigmoid colon. Few   diverticula demonstrate subtle inflammation, at the transition of the   descending to sigmoid colon. Increased mucosal enhancement in the   rectosigmoid suggest mild inflammation. Uncomplicated paraduodenal   diverticulum.  Complex intraluminal fluid in the transverse colon, ascending colon and   in the rectosigmoid. No bowel obstruction. Appendix is normal.  PERITONEUM: No ascites. No pneumoperitoneum.  VESSELS: Atherosclerotic changes.  RETROPERITONEUM/LYMPH NODES: No lymphadenopathy.  ABDOMINAL WALL: Anterior abdominal wall periumbilical hernia containing   fat. Small bilateral inguinal fat-containing hernia.  BONES: Degenerative changes.    IMPRESSION:  Active GI bleed in the left transverse colon from a diverticulum.   Consider IR consultation.    Extensive colonic diverticulosis in the descending and sigmoid colon with   additional subtle acute diverticulitis in the proximal sigmoid colon.          CRITICAL CARE TIME SPENT: 43 mins  Time spent evaluating/treating patient with medical issues that pose a high risk for life threatening deterioration and/or end-organ damage, reviewing data/labs/imaging, discussing case with multidisciplinary team, discussing plan/goals of care with patient/family. Non-inclusive of procedure time.

## 2023-10-26 NOTE — PROGRESS NOTE ADULT - ASSESSMENT
69 yo F with PMH of diverticulosis who is currently admitted with recurrent diverticular bleed. She was planned for colonoscopy yesterday, but procedures were cancelled and she was taken to IR when she was noted to be hemodynamically unstable with active rectal hemorrhage. CTA positive for transverse colon diverticular bleed, but no intervention was performed due to bleeding stopped. S/p several units pRBC yesterday. Last unit with pre transfusion Hgb 7.5 to post transfusion of 8.0. Tachycardia and BP improved with small volume dark bloody bm this am.     # Transverse colon diverticular bleeding  - Bleeding appears to have stopped at this time, some equilibration of Hgb is expected  - Continue to trend Hgb q8hrs, transfuse as needed to keep >7  - Will plan for repeat prep this evening and colonoscopy tomorrow as long as patient remains hemodynamically stable  - Can have clear liquid diet   - If recurrence of active hemorrhage with hemodynamic instability, keep NPO and please call GI, IR, and surgery

## 2023-10-26 NOTE — CHART NOTE - NSCHARTNOTEFT_GEN_A_CORE
Patient dropped Hb again this afternoon. She had several bloody bms this am, none this afternoon. She is hemodynamically stable. Keep NPO. Prep ordered for colonoscopy tomorrow.

## 2023-10-26 NOTE — CONSULT NOTE ADULT - ASSESSMENT
A/P: Patient is a 67 yo female with PMHx HTN, GERD, Barretts esophagus, recent L knee replacement who presented with rectal bleeding, admitted to medicine for GIB. Patient had further bleeding with clots this morning with a drop in H/H. Patient repeat CT showed active GIB in the L transverse colon from a diverticulum. Patient was taken to IR suite where mesenteric angiogram did not show further active bleed. Embolization was not performed. Admitted to medical ICU. Patient was intubated, on propofol at 75 currently; no pressors.     Acute GIB  ABDIAS      Neuro:  - Currently sedated on Propofol  - Will assess mental status once off sedation     CV:   - Patient tachycardic to 105, NSR  - Not requiring pressors  - Vitals stable    Pulm:  - Patient was intubated for IR procedure  - Repeat abg showing 7.330/43/231/23/-3.2  - Lungs ctab  - Will continue to monitor, likely wean off sedation and attempt extubation this evening     Renal:   - Electrolytes WNL. Will obtain repeat CMP    GI:  - IV PPI BID   - Will repeat CBC  - Holding AC    Endo:  - Q6 fingersticks     ID:  - Zosyn for possible colitis. Will f.u cultures     Heme:  - No AC given GIB. SCDs for DVT ppx     Disposition: Will monitor in the medical ICU   
ASSESSMENT: 67 yo female with PSHx cholecystectomy  PMHx HTN, GERD, Barretts esophagus, recent L knee replacement who admitted to medicine of rectal bleed, patient with dropping H/H on presentation (10/24) H/H 10/33 (baseline 13) CT with no evidence of active bleed , patient continue to have bloody BM , H/H now dropped to 7, required 3 unit of PRBCs, 2 plasma and 1 platelet , on R CT scan patient found to have active bleeding on transverse colon , went with IR for angiogram without evidence of active bleed , patient continue to have bloody BM.     PLAN:    - transfuse per parameter   - f/u GI for colonoscopy   - f/u IR if patient continue to bleed   - Surgery will be last option if couldn't control bleed   - pain control  - strict I/Os  - Plan discussed with Attending, Dr. Ortiz     
69 yo F PMH of HTN, GERD, pre-diabetes, recent left knee replacement (10/06/23) presented to ED after rectal bleed 4-5 episodes started 5 am this morning.   CTA abd pelv showed extensive colonic diverticulosis. Mild long segment right-sided pericolonic inflammatory changes could reflect colitis or mild diverticulitis.

## 2023-10-27 LAB
ALBUMIN SERPL ELPH-MCNC: 2.6 G/DL — LOW (ref 3.3–5.2)
ALBUMIN SERPL ELPH-MCNC: 2.6 G/DL — LOW (ref 3.3–5.2)
ALBUMIN SERPL ELPH-MCNC: 2.8 G/DL — LOW (ref 3.3–5.2)
ALBUMIN SERPL ELPH-MCNC: 2.8 G/DL — LOW (ref 3.3–5.2)
ALBUMIN SERPL ELPH-MCNC: 3.1 G/DL — LOW (ref 3.3–5.2)
ALBUMIN SERPL ELPH-MCNC: 3.1 G/DL — LOW (ref 3.3–5.2)
ALP SERPL-CCNC: 52 U/L — SIGNIFICANT CHANGE UP (ref 40–120)
ALP SERPL-CCNC: 52 U/L — SIGNIFICANT CHANGE UP (ref 40–120)
ALP SERPL-CCNC: 57 U/L — SIGNIFICANT CHANGE UP (ref 40–120)
ALP SERPL-CCNC: 57 U/L — SIGNIFICANT CHANGE UP (ref 40–120)
ALP SERPL-CCNC: 61 U/L — SIGNIFICANT CHANGE UP (ref 40–120)
ALP SERPL-CCNC: 61 U/L — SIGNIFICANT CHANGE UP (ref 40–120)
ALT FLD-CCNC: 11 U/L — SIGNIFICANT CHANGE UP
ALT FLD-CCNC: 8 U/L — SIGNIFICANT CHANGE UP
ALT FLD-CCNC: 8 U/L — SIGNIFICANT CHANGE UP
ANION GAP SERPL CALC-SCNC: 10 MMOL/L — SIGNIFICANT CHANGE UP (ref 5–17)
ANION GAP SERPL CALC-SCNC: 9 MMOL/L — SIGNIFICANT CHANGE UP (ref 5–17)
ANION GAP SERPL CALC-SCNC: 9 MMOL/L — SIGNIFICANT CHANGE UP (ref 5–17)
ANISOCYTOSIS BLD QL: SLIGHT — SIGNIFICANT CHANGE UP
ANISOCYTOSIS BLD QL: SLIGHT — SIGNIFICANT CHANGE UP
APTT BLD: 24.7 SEC — SIGNIFICANT CHANGE UP (ref 24.5–35.6)
APTT BLD: 24.7 SEC — SIGNIFICANT CHANGE UP (ref 24.5–35.6)
APTT BLD: 25.9 SEC — SIGNIFICANT CHANGE UP (ref 24.5–35.6)
APTT BLD: 25.9 SEC — SIGNIFICANT CHANGE UP (ref 24.5–35.6)
AST SERPL-CCNC: 14 U/L — SIGNIFICANT CHANGE UP
AST SERPL-CCNC: 14 U/L — SIGNIFICANT CHANGE UP
AST SERPL-CCNC: 23 U/L — SIGNIFICANT CHANGE UP
AST SERPL-CCNC: 23 U/L — SIGNIFICANT CHANGE UP
AST SERPL-CCNC: 24 U/L — SIGNIFICANT CHANGE UP
AST SERPL-CCNC: 24 U/L — SIGNIFICANT CHANGE UP
BASOPHILS # BLD AUTO: 0.08 K/UL — SIGNIFICANT CHANGE UP (ref 0–0.2)
BASOPHILS # BLD AUTO: 0.08 K/UL — SIGNIFICANT CHANGE UP (ref 0–0.2)
BASOPHILS # BLD AUTO: 0.12 K/UL — SIGNIFICANT CHANGE UP (ref 0–0.2)
BASOPHILS # BLD AUTO: 0.12 K/UL — SIGNIFICANT CHANGE UP (ref 0–0.2)
BASOPHILS NFR BLD AUTO: 0.6 % — SIGNIFICANT CHANGE UP (ref 0–2)
BASOPHILS NFR BLD AUTO: 0.6 % — SIGNIFICANT CHANGE UP (ref 0–2)
BASOPHILS NFR BLD AUTO: 0.9 % — SIGNIFICANT CHANGE UP (ref 0–2)
BASOPHILS NFR BLD AUTO: 0.9 % — SIGNIFICANT CHANGE UP (ref 0–2)
BILIRUB SERPL-MCNC: 0.4 MG/DL — SIGNIFICANT CHANGE UP (ref 0.4–2)
BILIRUB SERPL-MCNC: 0.5 MG/DL — SIGNIFICANT CHANGE UP (ref 0.4–2)
BILIRUB SERPL-MCNC: 0.5 MG/DL — SIGNIFICANT CHANGE UP (ref 0.4–2)
BUN SERPL-MCNC: 13.3 MG/DL — SIGNIFICANT CHANGE UP (ref 8–20)
BUN SERPL-MCNC: 13.3 MG/DL — SIGNIFICANT CHANGE UP (ref 8–20)
BUN SERPL-MCNC: 5 MG/DL — LOW (ref 8–20)
BUN SERPL-MCNC: 5 MG/DL — LOW (ref 8–20)
BUN SERPL-MCNC: 5.6 MG/DL — LOW (ref 8–20)
BUN SERPL-MCNC: 5.6 MG/DL — LOW (ref 8–20)
CALCIUM SERPL-MCNC: 7.7 MG/DL — LOW (ref 8.4–10.5)
CALCIUM SERPL-MCNC: 7.7 MG/DL — LOW (ref 8.4–10.5)
CALCIUM SERPL-MCNC: 8.1 MG/DL — LOW (ref 8.4–10.5)
CALCIUM SERPL-MCNC: 8.1 MG/DL — LOW (ref 8.4–10.5)
CALCIUM SERPL-MCNC: 8.2 MG/DL — LOW (ref 8.4–10.5)
CALCIUM SERPL-MCNC: 8.2 MG/DL — LOW (ref 8.4–10.5)
CHLORIDE SERPL-SCNC: 104 MMOL/L — SIGNIFICANT CHANGE UP (ref 96–108)
CHLORIDE SERPL-SCNC: 104 MMOL/L — SIGNIFICANT CHANGE UP (ref 96–108)
CHLORIDE SERPL-SCNC: 105 MMOL/L — SIGNIFICANT CHANGE UP (ref 96–108)
CHLORIDE SERPL-SCNC: 105 MMOL/L — SIGNIFICANT CHANGE UP (ref 96–108)
CHLORIDE SERPL-SCNC: 106 MMOL/L — SIGNIFICANT CHANGE UP (ref 96–108)
CHLORIDE SERPL-SCNC: 106 MMOL/L — SIGNIFICANT CHANGE UP (ref 96–108)
CO2 SERPL-SCNC: 22 MMOL/L — SIGNIFICANT CHANGE UP (ref 22–29)
CO2 SERPL-SCNC: 22 MMOL/L — SIGNIFICANT CHANGE UP (ref 22–29)
CO2 SERPL-SCNC: 24 MMOL/L — SIGNIFICANT CHANGE UP (ref 22–29)
CO2 SERPL-SCNC: 24 MMOL/L — SIGNIFICANT CHANGE UP (ref 22–29)
CO2 SERPL-SCNC: 25 MMOL/L — SIGNIFICANT CHANGE UP (ref 22–29)
CO2 SERPL-SCNC: 25 MMOL/L — SIGNIFICANT CHANGE UP (ref 22–29)
CREAT SERPL-MCNC: 0.42 MG/DL — LOW (ref 0.5–1.3)
CREAT SERPL-MCNC: 0.42 MG/DL — LOW (ref 0.5–1.3)
CREAT SERPL-MCNC: 0.55 MG/DL — SIGNIFICANT CHANGE UP (ref 0.5–1.3)
CREAT SERPL-MCNC: 0.55 MG/DL — SIGNIFICANT CHANGE UP (ref 0.5–1.3)
CREAT SERPL-MCNC: 0.58 MG/DL — SIGNIFICANT CHANGE UP (ref 0.5–1.3)
CREAT SERPL-MCNC: 0.58 MG/DL — SIGNIFICANT CHANGE UP (ref 0.5–1.3)
EGFR: 100 ML/MIN/1.73M2 — SIGNIFICANT CHANGE UP
EGFR: 100 ML/MIN/1.73M2 — SIGNIFICANT CHANGE UP
EGFR: 107 ML/MIN/1.73M2 — SIGNIFICANT CHANGE UP
EGFR: 107 ML/MIN/1.73M2 — SIGNIFICANT CHANGE UP
EGFR: 99 ML/MIN/1.73M2 — SIGNIFICANT CHANGE UP
EGFR: 99 ML/MIN/1.73M2 — SIGNIFICANT CHANGE UP
ELLIPTOCYTES BLD QL SMEAR: SLIGHT — SIGNIFICANT CHANGE UP
ELLIPTOCYTES BLD QL SMEAR: SLIGHT — SIGNIFICANT CHANGE UP
EOSINOPHIL # BLD AUTO: 0.35 K/UL — SIGNIFICANT CHANGE UP (ref 0–0.5)
EOSINOPHIL # BLD AUTO: 0.35 K/UL — SIGNIFICANT CHANGE UP (ref 0–0.5)
EOSINOPHIL # BLD AUTO: 0.56 K/UL — HIGH (ref 0–0.5)
EOSINOPHIL # BLD AUTO: 0.56 K/UL — HIGH (ref 0–0.5)
EOSINOPHIL NFR BLD AUTO: 2.6 % — SIGNIFICANT CHANGE UP (ref 0–6)
EOSINOPHIL NFR BLD AUTO: 2.6 % — SIGNIFICANT CHANGE UP (ref 0–6)
EOSINOPHIL NFR BLD AUTO: 4.1 % — SIGNIFICANT CHANGE UP (ref 0–6)
EOSINOPHIL NFR BLD AUTO: 4.1 % — SIGNIFICANT CHANGE UP (ref 0–6)
GLUCOSE BLDC GLUCOMTR-MCNC: 121 MG/DL — HIGH (ref 70–99)
GLUCOSE BLDC GLUCOMTR-MCNC: 121 MG/DL — HIGH (ref 70–99)
GLUCOSE SERPL-MCNC: 111 MG/DL — HIGH (ref 70–99)
GLUCOSE SERPL-MCNC: 111 MG/DL — HIGH (ref 70–99)
GLUCOSE SERPL-MCNC: 112 MG/DL — HIGH (ref 70–99)
GLUCOSE SERPL-MCNC: 112 MG/DL — HIGH (ref 70–99)
GLUCOSE SERPL-MCNC: 96 MG/DL — SIGNIFICANT CHANGE UP (ref 70–99)
GLUCOSE SERPL-MCNC: 96 MG/DL — SIGNIFICANT CHANGE UP (ref 70–99)
HCT VFR BLD CALC: 19.5 % — CRITICAL LOW (ref 34.5–45)
HCT VFR BLD CALC: 19.5 % — CRITICAL LOW (ref 34.5–45)
HCT VFR BLD CALC: 24.1 % — LOW (ref 34.5–45)
HCT VFR BLD CALC: 24.1 % — LOW (ref 34.5–45)
HCT VFR BLD CALC: 27 % — LOW (ref 34.5–45)
HCT VFR BLD CALC: 27 % — LOW (ref 34.5–45)
HGB BLD-MCNC: 6.9 G/DL — CRITICAL LOW (ref 11.5–15.5)
HGB BLD-MCNC: 6.9 G/DL — CRITICAL LOW (ref 11.5–15.5)
HGB BLD-MCNC: 8.5 G/DL — LOW (ref 11.5–15.5)
HGB BLD-MCNC: 8.5 G/DL — LOW (ref 11.5–15.5)
HGB BLD-MCNC: 9.2 G/DL — LOW (ref 11.5–15.5)
HGB BLD-MCNC: 9.2 G/DL — LOW (ref 11.5–15.5)
HYPOCHROMIA BLD QL: SLIGHT — SIGNIFICANT CHANGE UP
HYPOCHROMIA BLD QL: SLIGHT — SIGNIFICANT CHANGE UP
IMM GRANULOCYTES NFR BLD AUTO: 2.3 % — HIGH (ref 0–0.9)
IMM GRANULOCYTES NFR BLD AUTO: 2.3 % — HIGH (ref 0–0.9)
INR BLD: 1.05 RATIO — SIGNIFICANT CHANGE UP (ref 0.85–1.18)
INR BLD: 1.05 RATIO — SIGNIFICANT CHANGE UP (ref 0.85–1.18)
INR BLD: 1.13 RATIO — SIGNIFICANT CHANGE UP (ref 0.85–1.18)
INR BLD: 1.13 RATIO — SIGNIFICANT CHANGE UP (ref 0.85–1.18)
LYMPHOCYTES # BLD AUTO: 18.7 % — SIGNIFICANT CHANGE UP (ref 13–44)
LYMPHOCYTES # BLD AUTO: 18.7 % — SIGNIFICANT CHANGE UP (ref 13–44)
LYMPHOCYTES # BLD AUTO: 19.1 % — SIGNIFICANT CHANGE UP (ref 13–44)
LYMPHOCYTES # BLD AUTO: 19.1 % — SIGNIFICANT CHANGE UP (ref 13–44)
LYMPHOCYTES # BLD AUTO: 2.54 K/UL — SIGNIFICANT CHANGE UP (ref 1–3.3)
LYMPHOCYTES # BLD AUTO: 2.54 K/UL — SIGNIFICANT CHANGE UP (ref 1–3.3)
LYMPHOCYTES # BLD AUTO: 2.57 K/UL — SIGNIFICANT CHANGE UP (ref 1–3.3)
LYMPHOCYTES # BLD AUTO: 2.57 K/UL — SIGNIFICANT CHANGE UP (ref 1–3.3)
MAGNESIUM SERPL-MCNC: 1.8 MG/DL — SIGNIFICANT CHANGE UP (ref 1.6–2.6)
MAGNESIUM SERPL-MCNC: 1.8 MG/DL — SIGNIFICANT CHANGE UP (ref 1.6–2.6)
MANUAL SMEAR VERIFICATION: SIGNIFICANT CHANGE UP
MANUAL SMEAR VERIFICATION: SIGNIFICANT CHANGE UP
MCHC RBC-ENTMCNC: 28.8 PG — SIGNIFICANT CHANGE UP (ref 27–34)
MCHC RBC-ENTMCNC: 28.8 PG — SIGNIFICANT CHANGE UP (ref 27–34)
MCHC RBC-ENTMCNC: 29.7 PG — SIGNIFICANT CHANGE UP (ref 27–34)
MCHC RBC-ENTMCNC: 29.7 PG — SIGNIFICANT CHANGE UP (ref 27–34)
MCHC RBC-ENTMCNC: 29.8 PG — SIGNIFICANT CHANGE UP (ref 27–34)
MCHC RBC-ENTMCNC: 29.8 PG — SIGNIFICANT CHANGE UP (ref 27–34)
MCHC RBC-ENTMCNC: 34.1 GM/DL — SIGNIFICANT CHANGE UP (ref 32–36)
MCHC RBC-ENTMCNC: 34.1 GM/DL — SIGNIFICANT CHANGE UP (ref 32–36)
MCHC RBC-ENTMCNC: 35.3 GM/DL — SIGNIFICANT CHANGE UP (ref 32–36)
MCHC RBC-ENTMCNC: 35.3 GM/DL — SIGNIFICANT CHANGE UP (ref 32–36)
MCHC RBC-ENTMCNC: 35.4 GM/DL — SIGNIFICANT CHANGE UP (ref 32–36)
MCHC RBC-ENTMCNC: 35.4 GM/DL — SIGNIFICANT CHANGE UP (ref 32–36)
MCV RBC AUTO: 84.1 FL — SIGNIFICANT CHANGE UP (ref 80–100)
MCV RBC AUTO: 84.1 FL — SIGNIFICANT CHANGE UP (ref 80–100)
MCV RBC AUTO: 84.4 FL — SIGNIFICANT CHANGE UP (ref 80–100)
MCV RBC AUTO: 84.4 FL — SIGNIFICANT CHANGE UP (ref 80–100)
MCV RBC AUTO: 84.6 FL — SIGNIFICANT CHANGE UP (ref 80–100)
MCV RBC AUTO: 84.6 FL — SIGNIFICANT CHANGE UP (ref 80–100)
MICROCYTES BLD QL: SLIGHT — SIGNIFICANT CHANGE UP
MICROCYTES BLD QL: SLIGHT — SIGNIFICANT CHANGE UP
MONOCYTES # BLD AUTO: 0.35 K/UL — SIGNIFICANT CHANGE UP (ref 0–0.9)
MONOCYTES # BLD AUTO: 0.35 K/UL — SIGNIFICANT CHANGE UP (ref 0–0.9)
MONOCYTES # BLD AUTO: 1.07 K/UL — HIGH (ref 0–0.9)
MONOCYTES # BLD AUTO: 1.07 K/UL — HIGH (ref 0–0.9)
MONOCYTES NFR BLD AUTO: 2.6 % — SIGNIFICANT CHANGE UP (ref 2–14)
MONOCYTES NFR BLD AUTO: 2.6 % — SIGNIFICANT CHANGE UP (ref 2–14)
MONOCYTES NFR BLD AUTO: 7.8 % — SIGNIFICANT CHANGE UP (ref 2–14)
MONOCYTES NFR BLD AUTO: 7.8 % — SIGNIFICANT CHANGE UP (ref 2–14)
MYELOCYTES NFR BLD: 1.7 % — HIGH (ref 0–0)
MYELOCYTES NFR BLD: 1.7 % — HIGH (ref 0–0)
NEUTROPHILS # BLD AUTO: 9.12 K/UL — HIGH (ref 1.8–7.4)
NEUTROPHILS # BLD AUTO: 9.12 K/UL — HIGH (ref 1.8–7.4)
NEUTROPHILS # BLD AUTO: 9.6 K/UL — HIGH (ref 1.8–7.4)
NEUTROPHILS # BLD AUTO: 9.6 K/UL — HIGH (ref 1.8–7.4)
NEUTROPHILS NFR BLD AUTO: 66.5 % — SIGNIFICANT CHANGE UP (ref 43–77)
NEUTROPHILS NFR BLD AUTO: 66.5 % — SIGNIFICANT CHANGE UP (ref 43–77)
NEUTROPHILS NFR BLD AUTO: 72.2 % — SIGNIFICANT CHANGE UP (ref 43–77)
NEUTROPHILS NFR BLD AUTO: 72.2 % — SIGNIFICANT CHANGE UP (ref 43–77)
OVALOCYTES BLD QL SMEAR: SLIGHT — SIGNIFICANT CHANGE UP
OVALOCYTES BLD QL SMEAR: SLIGHT — SIGNIFICANT CHANGE UP
PHOSPHATE SERPL-MCNC: 2.7 MG/DL — SIGNIFICANT CHANGE UP (ref 2.4–4.7)
PHOSPHATE SERPL-MCNC: 2.7 MG/DL — SIGNIFICANT CHANGE UP (ref 2.4–4.7)
PLAT MORPH BLD: NORMAL — SIGNIFICANT CHANGE UP
PLAT MORPH BLD: NORMAL — SIGNIFICANT CHANGE UP
PLATELET # BLD AUTO: 180 K/UL — SIGNIFICANT CHANGE UP (ref 150–400)
PLATELET # BLD AUTO: 180 K/UL — SIGNIFICANT CHANGE UP (ref 150–400)
PLATELET # BLD AUTO: 202 K/UL — SIGNIFICANT CHANGE UP (ref 150–400)
PLATELET # BLD AUTO: 202 K/UL — SIGNIFICANT CHANGE UP (ref 150–400)
PLATELET # BLD AUTO: 210 K/UL — SIGNIFICANT CHANGE UP (ref 150–400)
PLATELET # BLD AUTO: 210 K/UL — SIGNIFICANT CHANGE UP (ref 150–400)
POIKILOCYTOSIS BLD QL AUTO: SLIGHT — SIGNIFICANT CHANGE UP
POIKILOCYTOSIS BLD QL AUTO: SLIGHT — SIGNIFICANT CHANGE UP
POLYCHROMASIA BLD QL SMEAR: SLIGHT — SIGNIFICANT CHANGE UP
POLYCHROMASIA BLD QL SMEAR: SLIGHT — SIGNIFICANT CHANGE UP
POTASSIUM SERPL-MCNC: 2.8 MMOL/L — CRITICAL LOW (ref 3.5–5.3)
POTASSIUM SERPL-MCNC: 2.8 MMOL/L — CRITICAL LOW (ref 3.5–5.3)
POTASSIUM SERPL-MCNC: 3.5 MMOL/L — SIGNIFICANT CHANGE UP (ref 3.5–5.3)
POTASSIUM SERPL-MCNC: 3.5 MMOL/L — SIGNIFICANT CHANGE UP (ref 3.5–5.3)
POTASSIUM SERPL-MCNC: 3.7 MMOL/L — SIGNIFICANT CHANGE UP (ref 3.5–5.3)
POTASSIUM SERPL-MCNC: 3.7 MMOL/L — SIGNIFICANT CHANGE UP (ref 3.5–5.3)
POTASSIUM SERPL-SCNC: 2.8 MMOL/L — CRITICAL LOW (ref 3.5–5.3)
POTASSIUM SERPL-SCNC: 2.8 MMOL/L — CRITICAL LOW (ref 3.5–5.3)
POTASSIUM SERPL-SCNC: 3.5 MMOL/L — SIGNIFICANT CHANGE UP (ref 3.5–5.3)
POTASSIUM SERPL-SCNC: 3.5 MMOL/L — SIGNIFICANT CHANGE UP (ref 3.5–5.3)
POTASSIUM SERPL-SCNC: 3.7 MMOL/L — SIGNIFICANT CHANGE UP (ref 3.5–5.3)
POTASSIUM SERPL-SCNC: 3.7 MMOL/L — SIGNIFICANT CHANGE UP (ref 3.5–5.3)
PROT SERPL-MCNC: 4.3 G/DL — LOW (ref 6.6–8.7)
PROT SERPL-MCNC: 4.3 G/DL — LOW (ref 6.6–8.7)
PROT SERPL-MCNC: 4.9 G/DL — LOW (ref 6.6–8.7)
PROT SERPL-MCNC: 4.9 G/DL — LOW (ref 6.6–8.7)
PROT SERPL-MCNC: 5.2 G/DL — LOW (ref 6.6–8.7)
PROT SERPL-MCNC: 5.2 G/DL — LOW (ref 6.6–8.7)
PROTHROM AB SERPL-ACNC: 11.6 SEC — SIGNIFICANT CHANGE UP (ref 9.5–13)
PROTHROM AB SERPL-ACNC: 11.6 SEC — SIGNIFICANT CHANGE UP (ref 9.5–13)
PROTHROM AB SERPL-ACNC: 12.5 SEC — SIGNIFICANT CHANGE UP (ref 9.5–13)
PROTHROM AB SERPL-ACNC: 12.5 SEC — SIGNIFICANT CHANGE UP (ref 9.5–13)
RBC # BLD: 2.32 M/UL — LOW (ref 3.8–5.2)
RBC # BLD: 2.32 M/UL — LOW (ref 3.8–5.2)
RBC # BLD: 2.85 M/UL — LOW (ref 3.8–5.2)
RBC # BLD: 2.85 M/UL — LOW (ref 3.8–5.2)
RBC # BLD: 3.2 M/UL — LOW (ref 3.8–5.2)
RBC # BLD: 3.2 M/UL — LOW (ref 3.8–5.2)
RBC # FLD: 14.6 % — HIGH (ref 10.3–14.5)
RBC # FLD: 14.6 % — HIGH (ref 10.3–14.5)
RBC # FLD: 14.7 % — HIGH (ref 10.3–14.5)
RBC # FLD: 14.7 % — HIGH (ref 10.3–14.5)
RBC # FLD: 15.1 % — HIGH (ref 10.3–14.5)
RBC # FLD: 15.1 % — HIGH (ref 10.3–14.5)
RBC BLD AUTO: ABNORMAL
RBC BLD AUTO: ABNORMAL
SODIUM SERPL-SCNC: 137 MMOL/L — SIGNIFICANT CHANGE UP (ref 135–145)
SODIUM SERPL-SCNC: 137 MMOL/L — SIGNIFICANT CHANGE UP (ref 135–145)
SODIUM SERPL-SCNC: 138 MMOL/L — SIGNIFICANT CHANGE UP (ref 135–145)
SODIUM SERPL-SCNC: 138 MMOL/L — SIGNIFICANT CHANGE UP (ref 135–145)
SODIUM SERPL-SCNC: 139 MMOL/L — SIGNIFICANT CHANGE UP (ref 135–145)
SODIUM SERPL-SCNC: 139 MMOL/L — SIGNIFICANT CHANGE UP (ref 135–145)
VARIANT LYMPHS # BLD: 0.9 % — SIGNIFICANT CHANGE UP (ref 0–6)
VARIANT LYMPHS # BLD: 0.9 % — SIGNIFICANT CHANGE UP (ref 0–6)
WBC # BLD: 13.29 K/UL — HIGH (ref 3.8–10.5)
WBC # BLD: 13.29 K/UL — HIGH (ref 3.8–10.5)
WBC # BLD: 13.71 K/UL — HIGH (ref 3.8–10.5)
WBC # BLD: 13.71 K/UL — HIGH (ref 3.8–10.5)
WBC # BLD: 15.4 K/UL — HIGH (ref 3.8–10.5)
WBC # BLD: 15.4 K/UL — HIGH (ref 3.8–10.5)
WBC # FLD AUTO: 13.29 K/UL — HIGH (ref 3.8–10.5)
WBC # FLD AUTO: 13.29 K/UL — HIGH (ref 3.8–10.5)
WBC # FLD AUTO: 13.71 K/UL — HIGH (ref 3.8–10.5)
WBC # FLD AUTO: 13.71 K/UL — HIGH (ref 3.8–10.5)
WBC # FLD AUTO: 15.4 K/UL — HIGH (ref 3.8–10.5)
WBC # FLD AUTO: 15.4 K/UL — HIGH (ref 3.8–10.5)

## 2023-10-27 PROCEDURE — 99232 SBSQ HOSP IP/OBS MODERATE 35: CPT

## 2023-10-27 PROCEDURE — 45378 DIAGNOSTIC COLONOSCOPY: CPT

## 2023-10-27 RX ORDER — POTASSIUM CHLORIDE 20 MEQ
10 PACKET (EA) ORAL
Refills: 0 | Status: COMPLETED | OUTPATIENT
Start: 2023-10-27 | End: 2023-10-27

## 2023-10-27 RX ORDER — POTASSIUM CHLORIDE 20 MEQ
40 PACKET (EA) ORAL ONCE
Refills: 0 | Status: COMPLETED | OUTPATIENT
Start: 2023-10-27 | End: 2023-10-27

## 2023-10-27 RX ORDER — PANTOPRAZOLE SODIUM 20 MG/1
40 TABLET, DELAYED RELEASE ORAL DAILY
Refills: 0 | Status: DISCONTINUED | OUTPATIENT
Start: 2023-10-27 | End: 2023-10-31

## 2023-10-27 RX ADMIN — CHLORHEXIDINE GLUCONATE 1 APPLICATION(S): 213 SOLUTION TOPICAL at 12:01

## 2023-10-27 RX ADMIN — PANTOPRAZOLE SODIUM 40 MILLIGRAM(S): 20 TABLET, DELAYED RELEASE ORAL at 06:08

## 2023-10-27 RX ADMIN — Medication 12.5 MILLIGRAM(S): at 17:45

## 2023-10-27 RX ADMIN — PANTOPRAZOLE SODIUM 40 MILLIGRAM(S): 20 TABLET, DELAYED RELEASE ORAL at 17:45

## 2023-10-27 RX ADMIN — Medication 12.5 MILLIGRAM(S): at 06:07

## 2023-10-27 RX ADMIN — PIPERACILLIN AND TAZOBACTAM 25 GRAM(S): 4; .5 INJECTION, POWDER, LYOPHILIZED, FOR SOLUTION INTRAVENOUS at 00:22

## 2023-10-27 RX ADMIN — Medication 100 MILLIEQUIVALENT(S): at 08:54

## 2023-10-27 RX ADMIN — Medication 40 MILLIEQUIVALENT(S): at 06:07

## 2023-10-27 RX ADMIN — PIPERACILLIN AND TAZOBACTAM 25 GRAM(S): 4; .5 INJECTION, POWDER, LYOPHILIZED, FOR SOLUTION INTRAVENOUS at 07:32

## 2023-10-27 RX ADMIN — PIPERACILLIN AND TAZOBACTAM 25 GRAM(S): 4; .5 INJECTION, POWDER, LYOPHILIZED, FOR SOLUTION INTRAVENOUS at 15:37

## 2023-10-27 RX ADMIN — PIPERACILLIN AND TAZOBACTAM 25 GRAM(S): 4; .5 INJECTION, POWDER, LYOPHILIZED, FOR SOLUTION INTRAVENOUS at 23:49

## 2023-10-27 RX ADMIN — Medication 100 MILLIEQUIVALENT(S): at 06:08

## 2023-10-27 RX ADMIN — Medication 100 MILLIEQUIVALENT(S): at 07:30

## 2023-10-27 NOTE — PROGRESS NOTE ADULT - SUBJECTIVE AND OBJECTIVE BOX
Patient is a 68y old  Female who presents with a chief complaint of Gastrointestinal hemorrhage     (26 Oct 2023 12:52)      BRIEF HOSPITAL COURSE: 67 y/o F with a h/o HTN, GERD, Barretts esophagus, recent left TKR, admitted on 10/24 with complaints of rectal bleeding. Initial CTA abdomen did not demonstrate active bleeding, showed long segment R sided pericolonic inflammatory changes which could reflect colitis/diverticulitis. Patient experienced recurrent bloody BMs with severe ABLA. Repeat CTA A/P revealed active bleeding in the left transverse colon from a diverticulum. Patient subsequently underwent angiogram with IR which no longer revealed active bleeding and no intervention was performed. Continued bleeding clinically with recurrent PRBC transfusion requirement.    Events last 24 hours: Prepping for colonoscopy today. Liquid BMs- initially blood streaked. Hemodynamically intact.          PAST MEDICAL & SURGICAL HISTORY:  HTN (hypertension)      GERD (gastroesophageal reflux disease)      Unilateral primary osteoarthritis, left knee      Thyroid nodule      Prediabetes      H/O oral surgery      H/O tubal ligation      History of cholecystectomy      S/P excision of lipoma          Review of Systems:  CONSTITUTIONAL: No fever, chills, or fatigue  EYES: No eye pain, visual disturbances, or discharge  ENMT:  No difficulty hearing, tinnitus, vertigo; No sinus or throat pain  NECK: No pain or stiffness  RESPIRATORY: No cough, wheezing, chills or hemoptysis; No shortness of breath  CARDIOVASCULAR: No chest pain, palpitations, dizziness, or leg swelling  GASTROINTESTINAL: No abdominal or epigastric pain. No nausea, vomiting, or hematemesis; No diarrhea or constipation. No melena or hematochezia.  GENITOURINARY: No dysuria, frequency, hematuria, or incontinence  NEUROLOGICAL: No headaches, memory loss, loss of strength, numbness, or tremors  SKIN: No itching, burning, rashes, or lesions   MUSCULOSKELETAL: No joint pain or swelling; No muscle, back, or extremity pain  PSYCHIATRIC: No depression, anxiety, mood swings, or difficulty sleeping      Medications:  piperacillin/tazobactam IVPB.. 3.375 Gram(s) IV Intermittent every 8 hours    metoprolol tartrate 12.5 milliGRAM(s) Oral every 12 hours      acetaminophen     Tablet .. 650 milliGRAM(s) Oral every 6 hours PRN  ondansetron Injectable 4 milliGRAM(s) IV Push every 6 hours PRN        pantoprazole  Injectable 40 milliGRAM(s) IV Push every 12 hours            chlorhexidine 2% Cloths 1 Application(s) Topical daily            ICU Vital Signs Last 24 Hrs  T(C): 36.4 (26 Oct 2023 23:10), Max: 36.8 (26 Oct 2023 11:11)  T(F): 97.6 (26 Oct 2023 23:10), Max: 98.3 (26 Oct 2023 11:11)  HR: 93 (27 Oct 2023 00:00) (87 - 120)  BP: 155/55 (27 Oct 2023 00:00) (89/62 - 162/53)  BP(mean): 79 (27 Oct 2023 00:00) (66 - 98)  ABP: --  ABP(mean): --  RR: 15 (27 Oct 2023 00:00) (10 - 27)  SpO2: 100% (27 Oct 2023 00:00) (94% - 100%)    O2 Parameters below as of 26 Oct 2023 08:00  Patient On (Oxygen Delivery Method): nasal cannula  O2 Flow (L/min): 2          ABG - ( 25 Oct 2023 21:55 )  pH, Arterial: 7.610 pH, Blood: x     /  pCO2: <20   /  pO2: 141   / HCO3: 19    / Base Excess: -2.3  /  SaO2: 100.0               I&O's Detail    25 Oct 2023 07:01  -  26 Oct 2023 07:00  --------------------------------------------------------  IN:    PRBCs (Packed Red Blood Cells): 700 mL  Total IN: 700 mL    OUT:    Propofol: 0 mL    Voided (mL): 600 mL  Total OUT: 600 mL    Total NET: 100 mL      26 Oct 2023 07:01  -  27 Oct 2023 03:36  --------------------------------------------------------  IN:    Oral Fluid: 400 mL  Total IN: 400 mL    OUT:  Total OUT: 0 mL    Total NET: 400 mL            LABS:                        8.1    17.97 )-----------( 211      ( 26 Oct 2023 21:10 )             23.0     10-26    140  |  105  |  24.2<H>  ----------------------------<  165<H>  3.4<L>   |  21.0<L>  |  0.97    Ca    7.6<L>      26 Oct 2023 15:00  Phos  3.7     10-26  Mg     1.8     10-26    TPro  4.1<L>  /  Alb  2.5<L>  /  TBili  0.3<L>  /  DBili  x   /  AST  16  /  ALT  8   /  AlkPhos  51  10-26          CAPILLARY BLOOD GLUCOSE        PT/INR - ( 25 Oct 2023 19:00 )   PT: 13.5 sec;   INR: 1.22 ratio         PTT - ( 25 Oct 2023 19:00 )  PTT:26.0 sec  Urinalysis Basic - ( 26 Oct 2023 15:00 )    Color: x / Appearance: x / SG: x / pH: x  Gluc: 165 mg/dL / Ketone: x  / Bili: x / Urobili: x   Blood: x / Protein: x / Nitrite: x   Leuk Esterase: x / RBC: x / WBC x   Sq Epi: x / Non Sq Epi: x / Bacteria: x      CULTURES:        Physical Examination:    General: No acute distress.  Alert, oriented, interactive, nonfocal    HEENT: Pupils equal, reactive to light.  Symmetric.    PULM: Clear to auscultation bilaterally, no significant sputum production    CVS: Regular rate and rhythm, no murmurs, rubs, or gallops    ABD: Soft, nondistended, nontender, normoactive bowel sounds, no masses    EXT: No edema, nontender    SKIN: Warm and well perfused, no rashes noted.    NEURO: A&Ox3, strength 5/5 all extremities, cranial nerves grossly intact, no focal deficits        RADIOLOGY: ***        CRITICAL CARE TIME SPENT: ***  Time spent evaluating/treating patient with medical issues that pose a high risk for life threatening deterioration and/or end-organ damage, reviewing data/labs/imaging, discussing case with multidisciplinary team, discussing plan/goals of care with patient/family. Non-inclusive of procedure time.   Patient is a 68y old  Female who presents with a chief complaint of Gastrointestinal hemorrhage     (26 Oct 2023 12:52)      BRIEF HOSPITAL COURSE: 69 y/o F with a h/o HTN, GERD, Barretts esophagus, recent left TKR, admitted on 10/24 with complaints of rectal bleeding. Initial CTA abdomen did not demonstrate active bleeding, showed long segment R sided pericolonic inflammatory changes which could reflect colitis/diverticulitis. Patient experienced recurrent bloody BMs with severe ABLA. Repeat CTA A/P revealed active bleeding in the left transverse colon from a diverticulum. Patient subsequently underwent angiogram with IR which no longer revealed active bleeding and no intervention was performed. Continued bleeding clinically with recurrent PRBC transfusion requirement.    Events last 24 hours: Prepping for colonoscopy today. Liquid BMs- initially blood streaked and now have cleared. Hemodynamically intact.          PAST MEDICAL & SURGICAL HISTORY:  HTN (hypertension)      GERD (gastroesophageal reflux disease)      Unilateral primary osteoarthritis, left knee      Thyroid nodule      Prediabetes      H/O oral surgery      H/O tubal ligation      History of cholecystectomy      S/P excision of lipoma          Review of Systems:  CONSTITUTIONAL: No fever, chills, or fatigue  EYES: No eye pain, visual disturbances, or discharge  ENMT:  No difficulty hearing, tinnitus, vertigo; No sinus or throat pain  NECK: No pain or stiffness  RESPIRATORY: No cough, wheezing, chills or hemoptysis; No shortness of breath  CARDIOVASCULAR: No chest pain, palpitations, dizziness, or leg swelling  GASTROINTESTINAL: No abdominal or epigastric pain. No nausea, vomiting, or hematemesis; No diarrhea or constipation. No melena or hematochezia.  GENITOURINARY: No dysuria, frequency, hematuria, or incontinence  NEUROLOGICAL: No headaches, memory loss, loss of strength, numbness, or tremors  SKIN: No itching, burning, rashes, or lesions   MUSCULOSKELETAL: No joint pain or swelling; No muscle, back, or extremity pain  PSYCHIATRIC: No depression, anxiety, mood swings, or difficulty sleeping      Medications:  piperacillin/tazobactam IVPB.. 3.375 Gram(s) IV Intermittent every 8 hours  metoprolol tartrate 12.5 milliGRAM(s) Oral every 12 hour  acetaminophen     Tablet .. 650 milliGRAM(s) Oral every 6 hours PRN  ondansetron Injectable 4 milliGRAM(s) IV Push every 6 hours PRN  pantoprazole  Injectable 40 milliGRAM(s) IV Push every 12 hours  chlorhexidine 2% Cloths 1 Application(s) Topical daily            ICU Vital Signs Last 24 Hrs  T(C): 36.4 (26 Oct 2023 23:10), Max: 36.8 (26 Oct 2023 11:11)  T(F): 97.6 (26 Oct 2023 23:10), Max: 98.3 (26 Oct 2023 11:11)  HR: 93 (27 Oct 2023 00:00) (87 - 120)  BP: 155/55 (27 Oct 2023 00:00) (89/62 - 162/53)  BP(mean): 79 (27 Oct 2023 00:00) (66 - 98)  ABP: --  ABP(mean): --  RR: 15 (27 Oct 2023 00:00) (10 - 27)  SpO2: 100% (27 Oct 2023 00:00) (94% - 100%)    O2 Parameters below as of 26 Oct 2023 08:00  Patient On (Oxygen Delivery Method): nasal cannula  O2 Flow (L/min): 2          ABG - ( 25 Oct 2023 21:55 )  pH, Arterial: 7.610 pH, Blood: x     /  pCO2: <20   /  pO2: 141   / HCO3: 19    / Base Excess: -2.3  /  SaO2: 100.0               I&O's Detail    25 Oct 2023 07:01  -  26 Oct 2023 07:00  --------------------------------------------------------  IN:    PRBCs (Packed Red Blood Cells): 700 mL  Total IN: 700 mL    OUT:    Propofol: 0 mL    Voided (mL): 600 mL  Total OUT: 600 mL    Total NET: 100 mL      26 Oct 2023 07:01  -  27 Oct 2023 03:36  --------------------------------------------------------  IN:    Oral Fluid: 400 mL  Total IN: 400 mL    OUT:  Total OUT: 0 mL    Total NET: 400 mL            LABS:                        8.1    17.97 )-----------( 211      ( 26 Oct 2023 21:10 )             23.0     10-26    140  |  105  |  24.2<H>  ----------------------------<  165<H>  3.4<L>   |  21.0<L>  |  0.97    Ca    7.6<L>      26 Oct 2023 15:00  Phos  3.7     10-26  Mg     1.8     10-26    TPro  4.1<L>  /  Alb  2.5<L>  /  TBili  0.3<L>  /  DBili  x   /  AST  16  /  ALT  8   /  AlkPhos  51  10-26          CAPILLARY BLOOD GLUCOSE        PT/INR - ( 25 Oct 2023 19:00 )   PT: 13.5 sec;   INR: 1.22 ratio         PTT - ( 25 Oct 2023 19:00 )  PTT:26.0 sec  Urinalysis Basic - ( 26 Oct 2023 15:00 )    Color: x / Appearance: x / SG: x / pH: x  Gluc: 165 mg/dL / Ketone: x  / Bili: x / Urobili: x   Blood: x / Protein: x / Nitrite: x   Leuk Esterase: x / RBC: x / WBC x   Sq Epi: x / Non Sq Epi: x / Bacteria: x      CULTURES:        Physical Examination:    General: No acute distress.  Alert, oriented, interactive, nonfocal    HEENT: Pupils equal, reactive to light.  Symmetric.    PULM: Clear to auscultation bilaterally, no significant sputum production    CVS: Regular rate and rhythm, no murmurs, rubs, or gallops    ABD: Soft, nondistended, nontender, normoactive bowel sounds, no masses    EXT: No edema, nontender    SKIN: Warm and well perfused, no rashes noted.    NEURO: A&Ox3, strength 5/5 all extremities, cranial nerves grossly intact, no focal deficits        RADIOLOGY:     < from: CT Abdomen and Pelvis w/wo IV Cont (10.25.23 @ 15:23) >  FINDINGS:  LOWER CHEST: Lung bases are clear. Mitral valvular calcifications.    LIVER: Right liver lobe hepatic cyst and subcentimeter hypodensities too   small to characterize.  BILE DUCTS: Normal caliber. CBD measures 1.2 cm postcholecystectomy.  GALLBLADDER: Cholecystectomy.  SPLEEN: Within normal limits.  PANCREAS: Within normal limits.  ADRENALS: Within normal limits.  KIDNEYS/URETERS: No renal stones or hydronephrosis. Symmetric homogeneous   enhancement.    BLADDER: Within normal limits.  REPRODUCTIVE ORGANS: Uterus myomatosis.    BOWEL: There is active contrast extravasation into the colonic lumen from   a left transverse colon diverticulum, best seen on image 8:36-39.   Extensive colonic diverticulosis in the descending and sigmoid colon. Few   diverticula demonstrate subtle inflammation, at the transition of the   descending to sigmoid colon. Increased mucosal enhancement in the   rectosigmoid suggest mild inflammation. Uncomplicated paraduodenal   diverticulum.  Complex intraluminal fluid in the transverse colon, ascending colon and   in the rectosigmoid. No bowel obstruction. Appendix is normal.  PERITONEUM: No ascites. No pneumoperitoneum.  VESSELS: Atherosclerotic changes.  RETROPERITONEUM/LYMPH NODES: No lymphadenopathy.  ABDOMINAL WALL: Anterior abdominal wall periumbilical hernia containing   fat. Small bilateral inguinal fat-containing hernia.  BONES: Degenerative changes.    IMPRESSION:  Active GI bleed in the left transverse colon from a diverticulum.   Consider IR consultation.    Extensive colonic diverticulosis in the descending and sigmoid colon with   additional subtle acute diverticulitis in the proximal sigmoid colon.     Patient is a 68y old  Female who presents with a chief complaint of Gastrointestinal hemorrhage     (26 Oct 2023 12:52)      BRIEF HOSPITAL COURSE: 67 y/o F with a h/o HTN, GERD, Barretts esophagus, recent left TKR, admitted on 10/24 with complaints of rectal bleeding. Initial CTA abdomen did not demonstrate active bleeding, showed long segment R sided pericolonic inflammatory changes which could reflect colitis/diverticulitis. Patient experienced recurrent bloody BMs with severe ABLA. Repeat CTA A/P revealed active bleeding in the left transverse colon from a diverticulum. Patient subsequently underwent angiogram with IR which no longer revealed active bleeding and no intervention was performed. Continued bleeding clinically with recurrent PRBC transfusion requirement.    Events last 24 hours: Prepping for colonoscopy today. Liquid BMs- initially blood streaked and now have cleared, however repeat Hgb this morning has dropped 8.1 --> 6.9. Hemodynamically intact.          PAST MEDICAL & SURGICAL HISTORY:  HTN (hypertension)      GERD (gastroesophageal reflux disease)      Unilateral primary osteoarthritis, left knee      Thyroid nodule      Prediabetes      H/O oral surgery      H/O tubal ligation      History of cholecystectomy      S/P excision of lipoma          Review of Systems:  CONSTITUTIONAL: No fever, chills, or fatigue  EYES: No eye pain, visual disturbances, or discharge  ENMT:  No difficulty hearing, tinnitus, vertigo; No sinus or throat pain  NECK: No pain or stiffness  RESPIRATORY: No cough, wheezing, chills or hemoptysis; No shortness of breath  CARDIOVASCULAR: No chest pain, palpitations, dizziness, or leg swelling  GASTROINTESTINAL: No abdominal or epigastric pain. No nausea, vomiting, or hematemesis; No diarrhea or constipation. No melena or hematochezia.  GENITOURINARY: No dysuria, frequency, hematuria, or incontinence  NEUROLOGICAL: No headaches, memory loss, loss of strength, numbness, or tremors  SKIN: No itching, burning, rashes, or lesions   MUSCULOSKELETAL: No joint pain or swelling; No muscle, back, or extremity pain  PSYCHIATRIC: No depression, anxiety, mood swings, or difficulty sleeping      Medications:  piperacillin/tazobactam IVPB.. 3.375 Gram(s) IV Intermittent every 8 hours  metoprolol tartrate 12.5 milliGRAM(s) Oral every 12 hour  acetaminophen     Tablet .. 650 milliGRAM(s) Oral every 6 hours PRN  ondansetron Injectable 4 milliGRAM(s) IV Push every 6 hours PRN  pantoprazole  Injectable 40 milliGRAM(s) IV Push every 12 hours  chlorhexidine 2% Cloths 1 Application(s) Topical daily            ICU Vital Signs Last 24 Hrs  T(C): 36.4 (26 Oct 2023 23:10), Max: 36.8 (26 Oct 2023 11:11)  T(F): 97.6 (26 Oct 2023 23:10), Max: 98.3 (26 Oct 2023 11:11)  HR: 93 (27 Oct 2023 00:00) (87 - 120)  BP: 155/55 (27 Oct 2023 00:00) (89/62 - 162/53)  BP(mean): 79 (27 Oct 2023 00:00) (66 - 98)  ABP: --  ABP(mean): --  RR: 15 (27 Oct 2023 00:00) (10 - 27)  SpO2: 100% (27 Oct 2023 00:00) (94% - 100%)    O2 Parameters below as of 26 Oct 2023 08:00  Patient On (Oxygen Delivery Method): nasal cannula  O2 Flow (L/min): 2          ABG - ( 25 Oct 2023 21:55 )  pH, Arterial: 7.610 pH, Blood: x     /  pCO2: <20   /  pO2: 141   / HCO3: 19    / Base Excess: -2.3  /  SaO2: 100.0               I&O's Detail    25 Oct 2023 07:01  -  26 Oct 2023 07:00  --------------------------------------------------------  IN:    PRBCs (Packed Red Blood Cells): 700 mL  Total IN: 700 mL    OUT:    Propofol: 0 mL    Voided (mL): 600 mL  Total OUT: 600 mL    Total NET: 100 mL      26 Oct 2023 07:01  -  27 Oct 2023 03:36  --------------------------------------------------------  IN:    Oral Fluid: 400 mL  Total IN: 400 mL    OUT:  Total OUT: 0 mL    Total NET: 400 mL            LABS:                        8.1    17.97 )-----------( 211      ( 26 Oct 2023 21:10 )             23.0     10-26    140  |  105  |  24.2<H>  ----------------------------<  165<H>  3.4<L>   |  21.0<L>  |  0.97    Ca    7.6<L>      26 Oct 2023 15:00  Phos  3.7     10-26  Mg     1.8     10-26    TPro  4.1<L>  /  Alb  2.5<L>  /  TBili  0.3<L>  /  DBili  x   /  AST  16  /  ALT  8   /  AlkPhos  51  10-26          CAPILLARY BLOOD GLUCOSE        PT/INR - ( 25 Oct 2023 19:00 )   PT: 13.5 sec;   INR: 1.22 ratio         PTT - ( 25 Oct 2023 19:00 )  PTT:26.0 sec  Urinalysis Basic - ( 26 Oct 2023 15:00 )    Color: x / Appearance: x / SG: x / pH: x  Gluc: 165 mg/dL / Ketone: x  / Bili: x / Urobili: x   Blood: x / Protein: x / Nitrite: x   Leuk Esterase: x / RBC: x / WBC x   Sq Epi: x / Non Sq Epi: x / Bacteria: x      CULTURES:        Physical Examination:    General: No acute distress.  Alert, oriented, interactive, nonfocal    HEENT: Pupils equal, reactive to light.  Symmetric.    PULM: Clear to auscultation bilaterally, no significant sputum production    CVS: Regular rate and rhythm, no murmurs, rubs, or gallops    ABD: Soft, nondistended, nontender, normoactive bowel sounds, no masses    EXT: No edema, nontender    SKIN: Warm and well perfused, no rashes noted.    NEURO: A&Ox3, strength 5/5 all extremities, cranial nerves grossly intact, no focal deficits        RADIOLOGY:     < from: CT Abdomen and Pelvis w/wo IV Cont (10.25.23 @ 15:23) >  FINDINGS:  LOWER CHEST: Lung bases are clear. Mitral valvular calcifications.    LIVER: Right liver lobe hepatic cyst and subcentimeter hypodensities too   small to characterize.  BILE DUCTS: Normal caliber. CBD measures 1.2 cm postcholecystectomy.  GALLBLADDER: Cholecystectomy.  SPLEEN: Within normal limits.  PANCREAS: Within normal limits.  ADRENALS: Within normal limits.  KIDNEYS/URETERS: No renal stones or hydronephrosis. Symmetric homogeneous   enhancement.    BLADDER: Within normal limits.  REPRODUCTIVE ORGANS: Uterus myomatosis.    BOWEL: There is active contrast extravasation into the colonic lumen from   a left transverse colon diverticulum, best seen on image 8:36-39.   Extensive colonic diverticulosis in the descending and sigmoid colon. Few   diverticula demonstrate subtle inflammation, at the transition of the   descending to sigmoid colon. Increased mucosal enhancement in the   rectosigmoid suggest mild inflammation. Uncomplicated paraduodenal   diverticulum.  Complex intraluminal fluid in the transverse colon, ascending colon and   in the rectosigmoid. No bowel obstruction. Appendix is normal.  PERITONEUM: No ascites. No pneumoperitoneum.  VESSELS: Atherosclerotic changes.  RETROPERITONEUM/LYMPH NODES: No lymphadenopathy.  ABDOMINAL WALL: Anterior abdominal wall periumbilical hernia containing   fat. Small bilateral inguinal fat-containing hernia.  BONES: Degenerative changes.    IMPRESSION:  Active GI bleed in the left transverse colon from a diverticulum.   Consider IR consultation.    Extensive colonic diverticulosis in the descending and sigmoid colon with   additional subtle acute diverticulitis in the proximal sigmoid colon.

## 2023-10-27 NOTE — PROGRESS NOTE ADULT - ATTENDING COMMENTS
2022    TELEHEALTH EVALUATION -- Audio/Visual (During PLRHF-94 public health emergency)    HPI:    Manuel Rand (:  1998) has requested an audio/video evaluation for the following concern(s):      URI complaints   Bilateral ear pain, sore throat, nasal congestion, rhinorrhea. Coughing, swollen lymph nodes. Tried night time cough medicine and tylenol which is not helping. Symptoms started weeks ago mild, but severe as of yesterday. Home covid testing NEGATIVE     Requesting work note. Works retail. Review of Systems    Prior to Visit Medications    Medication Sig Taking? Authorizing Provider   amoxicillin-clavulanate (AUGMENTIN) 875-125 MG per tablet Take 1 tablet by mouth in the morning and 1 tablet before bedtime. Do all this for 7 days. Yes SALAZAR Patel NP   vitamin D (ERGOCALCIFEROL) 1.25 MG (02636 UT) CAPS capsule Take 1 capsule by mouth once a week For eight weeks only and then start vit d 2000 units daily as ordered Yes SALAZAR Patel NP   vitamin D3 (CHOLECALCIFEROL) 25 MCG (1000 UT) TABS tablet Take 2 tablets by mouth in the morning. Yes SALAZAR Patel NP       Social History     Tobacco Use    Smoking status: Never    Smokeless tobacco: Never   Vaping Use    Vaping Use: Never used   Substance Use Topics    Alcohol use: Never    Drug use: Never        PHYSICAL EXAMINATION:  Vital Signs: (As obtained by patient/caregiver or practitioner observation)    Blood pressure-  Heart rate-    Respiratory rate-    Temperature-  Pulse oximetry-     Physical Exam  Vitals reviewed. Constitutional:       General: She is not in acute distress. Appearance: Normal appearance. She is ill-appearing. She is not toxic-appearing or diaphoretic. Comments: Coughing    Pulmonary:      Effort: Pulmonary effort is normal. No respiratory distress. Musculoskeletal:      Cervical back: Normal range of motion.       Comments: Limited assessment of Range of motion visible on video, upper body within normal limits   Skin:     Coloration: Skin is not pale. Neurological:      Mental Status: She is alert and oriented to person, place, and time. Mental status is at baseline. Psychiatric:         Mood and Affect: Mood normal.         Behavior: Behavior normal.         Thought Content: Thought content normal.         Judgment: Judgment normal.       ASSESSMENT/PLAN:  1. Acute bacterial sinusitis   Orders Placed This Encounter   Medications    amoxicillin-clavulanate (AUGMENTIN) 875-125 MG per tablet     Sig: Take 1 tablet by mouth in the morning and 1 tablet before bedtime. Do all this for 7 days. Dispense:  14 tablet     Refill:  0     Please give closest liquid equivalent     Work note to return to work without restrictions 7/30/2022. Over-the-counter symptom management as well. Rest and fluids. No follow-ups on file. Kelsi Acuña is a 26 y.o. female being evaluated by a Virtual Visit (video visit) encounter to address concerns as mentioned above. A caregiver was present when appropriate. Due to this being a TeleHealth encounter (During [facility]- public health emergency), evaluation of the following organ systems was limited: Vitals/Constitutional/EENT/Resp/CV/GI//MS/Neuro/Skin/Heme-Lymph-Imm. Pursuant to the emergency declaration under the [address] [place] [address] authority and the [employer] and [employer] Act, this Virtual Visit was conducted with patient's (and/or legal guardian's) consent, to reduce the patient's risk of exposure to COVID-19 and provide necessary medical care. The patient (and/or legal guardian) has also been advised to contact this office for worsening conditions or problems, and seek emergency medical treatment and/or call 911 if deemed necessary.      Patient identification was verified at the start of the visit: Yes    Total time spent on this encounter: Not billed by time    Services were provided through a video synchronous discussion virtually to substitute for in-person clinic visit. Patient and provider were located at their individual homes. --SALAZAR Ronquillo NP on 7/27/2022 at 1:51 PM    An electronic signature was used to authenticate this note. S&E

## 2023-10-27 NOTE — PROGRESS NOTE ADULT - ASSESSMENT
69 y/o F with a h/o HTN, GERD, Barretts esophagus, recent left TKR, with:    # LGIB, diverticular  # Acute blood loss anemia  # Hypokalemia    - plan for colonoscopy today  - GI input appreciated  - H/H with appropriate response s/p another 1u PRBC (6.9 --> 8.1)  - trend CBC, transfuse PRBC for Hgb < 7 and/or symptomatic bleeding  - will request IR reevaluation if she experiences hemodynamically significant bleeding  - bowel resection would be last resort, surgery team input appreciated  - monitor hemodynamics closely, maintain a MAP > 65  - replete potassium as indicated (GI losses + intracellular shift in alkalotic environment)  - continue empiric Zosyn given finding of subtle acute diverticulitis on CT A/P      Case discussed with MICU physician, Dr. Jolley.      50 minutes accounts for the total time spent on this patient encounter, which includes assessing and addressing the problems and their associated risks as mentioned above, reviewing the medical record/laboratory data/imaging studies, interviewing and physically examining the patient, as well as coordinating care with the multidisciplinary team.   67 y/o F with a h/o HTN, GERD, Barretts esophagus, recent left TKR, with:    # LGIB, diverticular  # Acute blood loss anemia  # Hypokalemia    - plan for colonoscopy today  - GI input appreciated  - H/H with appropriate response s/p another 1u PRBC (6.9 --> 8.1), however repeat Hgb this morning down to 6.9  - transfuse 1u PRBC stat  - trend CBC, transfuse PRBC for Hgb < 7 and/or symptomatic bleeding  - will request IR reevaluation if she experiences hemodynamically significant bleeding  - bowel resection would be last resort, surgery team input appreciated  - monitor hemodynamics closely, maintain a MAP > 65  - replete potassium as indicated (GI losses + intracellular shift in alkalotic environment)  - continue empiric Zosyn given finding of subtle acute diverticulitis on CT A/P      Case discussed with MICU physician, Dr. Jolley.      50 minutes accounts for the total time spent on this patient encounter, which includes assessing and addressing the problems and their associated risks as mentioned above, reviewing the medical record/laboratory data/imaging studies, interviewing and physically examining the patient, as well as coordinating care with the multidisciplinary team.

## 2023-10-27 NOTE — PROGRESS NOTE ADULT - SUBJECTIVE AND OBJECTIVE BOX
Subjective: Patient seen and evaluated at beside. No overnight events.       MEDICATIONS  (STANDING):  chlorhexidine 2% Cloths 1 Application(s) Topical daily  metoprolol tartrate 12.5 milliGRAM(s) Oral every 12 hours  pantoprazole  Injectable 40 milliGRAM(s) IV Push every 12 hours  piperacillin/tazobactam IVPB.. 3.375 Gram(s) IV Intermittent every 8 hours  potassium chloride  10 mEq/100 mL IVPB 10 milliEquivalent(s) IV Intermittent every 1 hour    MEDICATIONS  (PRN):  acetaminophen     Tablet .. 650 milliGRAM(s) Oral every 6 hours PRN Temp greater or equal to 38C (100.4F), Mild Pain (1 - 3)  ondansetron Injectable 4 milliGRAM(s) IV Push every 6 hours PRN Nausea and/or Vomiting      Vital Signs Last 24 Hrs  T(C): 36.5 (27 Oct 2023 04:35), Max: 36.8 (26 Oct 2023 11:11)  T(F): 97.7 (27 Oct 2023 04:35), Max: 98.3 (26 Oct 2023 11:11)  HR: 90 (27 Oct 2023 06:00) (74 - 120)  BP: 155/79 (27 Oct 2023 06:00) (89/62 - 162/53)  BP(mean): 100 (27 Oct 2023 06:00) (70 - 101)  RR: 12 (27 Oct 2023 06:00) (10 - 27)  SpO2: 100% (27 Oct 2023 06:00) (94% - 100%)    Parameters below as of 26 Oct 2023 08:00  Patient On (Oxygen Delivery Method): nasal cannula  O2 Flow (L/min): 2      Physical Exam:  General: NAD, Lying in bed comfortably  Neuro: A+Ox3  HEENT: EOMI, PERRLA, MMM  Cardio: RRR  Resp: Non labored breathing on RA  GI/Abd: Soft, NT/ND, no rebound/guarding, no masses palpated  Vascular: All 4 extremities warm and well perfused.   Pelvis: stable  Musculoskeletal: All 4 extremities moving spontaneously, no limitations, no spinal tenderness.  --------------------------------------------------------------------------------------------    LABS:                        6.9    13.29 )-----------( 180      ( 27 Oct 2023 04:05 )             19.5     10-27    138  |  104  |  13.3  ----------------------------<  112<H>  2.8<LL>   |  25.0  |  0.58    Ca    7.7<L>      27 Oct 2023 04:05  Phos  2.7     10-27  Mg     1.8     10-27    TPro  4.3<L>  /  Alb  2.6<L>  /  TBili  0.5  /  DBili  x   /  AST  14  /  ALT  8   /  AlkPhos  52  10-27    PT/INR - ( 27 Oct 2023 04:05 )   PT: 12.5 sec;   INR: 1.13 ratio         PTT - ( 27 Oct 2023 04:05 )  PTT:24.7 sec  Urinalysis Basic - ( 27 Oct 2023 04:05 )    Color: x / Appearance: x / SG: x / pH: x  Gluc: 112 mg/dL / Ketone: x  / Bili: x / Urobili: x   Blood: x / Protein: x / Nitrite: x   Leuk Esterase: x / RBC: x / WBC x   Sq Epi: x / Non Sq Epi: x / Bacteria: x        A:  67 yo female with PSHx cholecystectomy  PMHx HTN, GERD, Barretts esophagus, recent L knee replacement who admitted to medicine of rectal bleed, patient with dropping H/H on presentation (10/24) H/H 10/33 (baseline 13) CT with no evidence of active bleed , patient continue to have bloody BM , H/H now dropped to 7, required 3 unit of PRBCs, 2 plasma and 1 platelet , on R CT scan patient found to have active bleeding on transverse colon , went with IR for angiogram without evidence of active bleed , patient continue to have bloody BM. GI to scope today will follow up.     PLAN:    - transfuse per parameter   - f/u GI for colonoscopy   - f/u IR if patient continue to bleed   - Surgery will be last option if couldn't control bleed   - pain control  - strict I/Os  - Plan discussed with Attending, Dr. Ortiz

## 2023-10-28 LAB
ALBUMIN SERPL ELPH-MCNC: 3.2 G/DL — LOW (ref 3.3–5.2)
ALBUMIN SERPL ELPH-MCNC: 3.2 G/DL — LOW (ref 3.3–5.2)
ALP SERPL-CCNC: 62 U/L — SIGNIFICANT CHANGE UP (ref 40–120)
ALP SERPL-CCNC: 62 U/L — SIGNIFICANT CHANGE UP (ref 40–120)
ALT FLD-CCNC: 11 U/L — SIGNIFICANT CHANGE UP
ALT FLD-CCNC: 11 U/L — SIGNIFICANT CHANGE UP
ANION GAP SERPL CALC-SCNC: 12 MMOL/L — SIGNIFICANT CHANGE UP (ref 5–17)
ANION GAP SERPL CALC-SCNC: 12 MMOL/L — SIGNIFICANT CHANGE UP (ref 5–17)
APTT BLD: 26.6 SEC — SIGNIFICANT CHANGE UP (ref 24.5–35.6)
APTT BLD: 26.6 SEC — SIGNIFICANT CHANGE UP (ref 24.5–35.6)
AST SERPL-CCNC: 17 U/L — SIGNIFICANT CHANGE UP
AST SERPL-CCNC: 17 U/L — SIGNIFICANT CHANGE UP
BASOPHILS # BLD AUTO: 0.1 K/UL — SIGNIFICANT CHANGE UP (ref 0–0.2)
BASOPHILS # BLD AUTO: 0.1 K/UL — SIGNIFICANT CHANGE UP (ref 0–0.2)
BASOPHILS NFR BLD AUTO: 0.8 % — SIGNIFICANT CHANGE UP (ref 0–2)
BASOPHILS NFR BLD AUTO: 0.8 % — SIGNIFICANT CHANGE UP (ref 0–2)
BILIRUB SERPL-MCNC: 0.5 MG/DL — SIGNIFICANT CHANGE UP (ref 0.4–2)
BILIRUB SERPL-MCNC: 0.5 MG/DL — SIGNIFICANT CHANGE UP (ref 0.4–2)
BUN SERPL-MCNC: 4.8 MG/DL — LOW (ref 8–20)
BUN SERPL-MCNC: 4.8 MG/DL — LOW (ref 8–20)
CALCIUM SERPL-MCNC: 8.4 MG/DL — SIGNIFICANT CHANGE UP (ref 8.4–10.5)
CALCIUM SERPL-MCNC: 8.4 MG/DL — SIGNIFICANT CHANGE UP (ref 8.4–10.5)
CHLORIDE SERPL-SCNC: 105 MMOL/L — SIGNIFICANT CHANGE UP (ref 96–108)
CHLORIDE SERPL-SCNC: 105 MMOL/L — SIGNIFICANT CHANGE UP (ref 96–108)
CO2 SERPL-SCNC: 24 MMOL/L — SIGNIFICANT CHANGE UP (ref 22–29)
CO2 SERPL-SCNC: 24 MMOL/L — SIGNIFICANT CHANGE UP (ref 22–29)
CREAT SERPL-MCNC: 0.54 MG/DL — SIGNIFICANT CHANGE UP (ref 0.5–1.3)
CREAT SERPL-MCNC: 0.54 MG/DL — SIGNIFICANT CHANGE UP (ref 0.5–1.3)
EGFR: 101 ML/MIN/1.73M2 — SIGNIFICANT CHANGE UP
EGFR: 101 ML/MIN/1.73M2 — SIGNIFICANT CHANGE UP
EOSINOPHIL # BLD AUTO: 0.75 K/UL — HIGH (ref 0–0.5)
EOSINOPHIL # BLD AUTO: 0.75 K/UL — HIGH (ref 0–0.5)
EOSINOPHIL NFR BLD AUTO: 5.6 % — SIGNIFICANT CHANGE UP (ref 0–6)
EOSINOPHIL NFR BLD AUTO: 5.6 % — SIGNIFICANT CHANGE UP (ref 0–6)
GLUCOSE BLDC GLUCOMTR-MCNC: 110 MG/DL — HIGH (ref 70–99)
GLUCOSE BLDC GLUCOMTR-MCNC: 110 MG/DL — HIGH (ref 70–99)
GLUCOSE SERPL-MCNC: 97 MG/DL — SIGNIFICANT CHANGE UP (ref 70–99)
GLUCOSE SERPL-MCNC: 97 MG/DL — SIGNIFICANT CHANGE UP (ref 70–99)
HCT VFR BLD CALC: 25.8 % — LOW (ref 34.5–45)
HCT VFR BLD CALC: 25.8 % — LOW (ref 34.5–45)
HGB BLD-MCNC: 8.8 G/DL — LOW (ref 11.5–15.5)
HGB BLD-MCNC: 8.8 G/DL — LOW (ref 11.5–15.5)
IMM GRANULOCYTES NFR BLD AUTO: 2 % — HIGH (ref 0–0.9)
IMM GRANULOCYTES NFR BLD AUTO: 2 % — HIGH (ref 0–0.9)
INR BLD: 1.07 RATIO — SIGNIFICANT CHANGE UP (ref 0.85–1.18)
INR BLD: 1.07 RATIO — SIGNIFICANT CHANGE UP (ref 0.85–1.18)
LYMPHOCYTES # BLD AUTO: 15.4 % — SIGNIFICANT CHANGE UP (ref 13–44)
LYMPHOCYTES # BLD AUTO: 15.4 % — SIGNIFICANT CHANGE UP (ref 13–44)
LYMPHOCYTES # BLD AUTO: 2.05 K/UL — SIGNIFICANT CHANGE UP (ref 1–3.3)
LYMPHOCYTES # BLD AUTO: 2.05 K/UL — SIGNIFICANT CHANGE UP (ref 1–3.3)
MAGNESIUM SERPL-MCNC: 2.1 MG/DL — SIGNIFICANT CHANGE UP (ref 1.6–2.6)
MAGNESIUM SERPL-MCNC: 2.1 MG/DL — SIGNIFICANT CHANGE UP (ref 1.6–2.6)
MCHC RBC-ENTMCNC: 29.5 PG — SIGNIFICANT CHANGE UP (ref 27–34)
MCHC RBC-ENTMCNC: 29.5 PG — SIGNIFICANT CHANGE UP (ref 27–34)
MCHC RBC-ENTMCNC: 34.1 GM/DL — SIGNIFICANT CHANGE UP (ref 32–36)
MCHC RBC-ENTMCNC: 34.1 GM/DL — SIGNIFICANT CHANGE UP (ref 32–36)
MCV RBC AUTO: 86.6 FL — SIGNIFICANT CHANGE UP (ref 80–100)
MCV RBC AUTO: 86.6 FL — SIGNIFICANT CHANGE UP (ref 80–100)
MONOCYTES # BLD AUTO: 0.95 K/UL — HIGH (ref 0–0.9)
MONOCYTES # BLD AUTO: 0.95 K/UL — HIGH (ref 0–0.9)
MONOCYTES NFR BLD AUTO: 7.1 % — SIGNIFICANT CHANGE UP (ref 2–14)
MONOCYTES NFR BLD AUTO: 7.1 % — SIGNIFICANT CHANGE UP (ref 2–14)
NEUTROPHILS # BLD AUTO: 9.17 K/UL — HIGH (ref 1.8–7.4)
NEUTROPHILS # BLD AUTO: 9.17 K/UL — HIGH (ref 1.8–7.4)
NEUTROPHILS NFR BLD AUTO: 69.1 % — SIGNIFICANT CHANGE UP (ref 43–77)
NEUTROPHILS NFR BLD AUTO: 69.1 % — SIGNIFICANT CHANGE UP (ref 43–77)
PHOSPHATE SERPL-MCNC: 3.3 MG/DL — SIGNIFICANT CHANGE UP (ref 2.4–4.7)
PHOSPHATE SERPL-MCNC: 3.3 MG/DL — SIGNIFICANT CHANGE UP (ref 2.4–4.7)
PLATELET # BLD AUTO: 237 K/UL — SIGNIFICANT CHANGE UP (ref 150–400)
PLATELET # BLD AUTO: 237 K/UL — SIGNIFICANT CHANGE UP (ref 150–400)
POTASSIUM SERPL-MCNC: 3.4 MMOL/L — LOW (ref 3.5–5.3)
POTASSIUM SERPL-MCNC: 3.4 MMOL/L — LOW (ref 3.5–5.3)
POTASSIUM SERPL-SCNC: 3.4 MMOL/L — LOW (ref 3.5–5.3)
POTASSIUM SERPL-SCNC: 3.4 MMOL/L — LOW (ref 3.5–5.3)
PROT SERPL-MCNC: 5.2 G/DL — LOW (ref 6.6–8.7)
PROT SERPL-MCNC: 5.2 G/DL — LOW (ref 6.6–8.7)
PROTHROM AB SERPL-ACNC: 11.8 SEC — SIGNIFICANT CHANGE UP (ref 9.5–13)
PROTHROM AB SERPL-ACNC: 11.8 SEC — SIGNIFICANT CHANGE UP (ref 9.5–13)
RBC # BLD: 2.98 M/UL — LOW (ref 3.8–5.2)
RBC # BLD: 2.98 M/UL — LOW (ref 3.8–5.2)
RBC # FLD: 15.5 % — HIGH (ref 10.3–14.5)
RBC # FLD: 15.5 % — HIGH (ref 10.3–14.5)
SODIUM SERPL-SCNC: 140 MMOL/L — SIGNIFICANT CHANGE UP (ref 135–145)
SODIUM SERPL-SCNC: 140 MMOL/L — SIGNIFICANT CHANGE UP (ref 135–145)
WBC # BLD: 13.29 K/UL — HIGH (ref 3.8–10.5)
WBC # BLD: 13.29 K/UL — HIGH (ref 3.8–10.5)
WBC # FLD AUTO: 13.29 K/UL — HIGH (ref 3.8–10.5)
WBC # FLD AUTO: 13.29 K/UL — HIGH (ref 3.8–10.5)

## 2023-10-28 PROCEDURE — 99232 SBSQ HOSP IP/OBS MODERATE 35: CPT

## 2023-10-28 RX ORDER — POTASSIUM CHLORIDE 20 MEQ
40 PACKET (EA) ORAL ONCE
Refills: 0 | Status: COMPLETED | OUTPATIENT
Start: 2023-10-28 | End: 2023-10-28

## 2023-10-28 RX ADMIN — PANTOPRAZOLE SODIUM 40 MILLIGRAM(S): 20 TABLET, DELAYED RELEASE ORAL at 12:08

## 2023-10-28 RX ADMIN — CHLORHEXIDINE GLUCONATE 1 APPLICATION(S): 213 SOLUTION TOPICAL at 12:03

## 2023-10-28 RX ADMIN — Medication 12.5 MILLIGRAM(S): at 06:49

## 2023-10-28 RX ADMIN — PIPERACILLIN AND TAZOBACTAM 25 GRAM(S): 4; .5 INJECTION, POWDER, LYOPHILIZED, FOR SOLUTION INTRAVENOUS at 17:15

## 2023-10-28 RX ADMIN — Medication 12.5 MILLIGRAM(S): at 17:15

## 2023-10-28 RX ADMIN — PIPERACILLIN AND TAZOBACTAM 25 GRAM(S): 4; .5 INJECTION, POWDER, LYOPHILIZED, FOR SOLUTION INTRAVENOUS at 09:07

## 2023-10-28 RX ADMIN — Medication 40 MILLIEQUIVALENT(S): at 09:12

## 2023-10-28 NOTE — PROGRESS NOTE ADULT - SUBJECTIVE AND OBJECTIVE BOX
Patient seen and examined at bedside. s/p colonoscopy yesterday that did not show evidence of active bleeding. No bloody BMs overnight. Hb stable (8.5 from 9.2). Denies abdominal pain or n/v. No other complaints this morning.     MEDICATIONS  (STANDING):  chlorhexidine 2% Cloths 1 Application(s) Topical daily  metoprolol tartrate 12.5 milliGRAM(s) Oral every 12 hours  pantoprazole  Injectable 40 milliGRAM(s) IV Push daily  piperacillin/tazobactam IVPB.. 3.375 Gram(s) IV Intermittent every 8 hours    MEDICATIONS  (PRN):  acetaminophen     Tablet .. 650 milliGRAM(s) Oral every 6 hours PRN Temp greater or equal to 38C (100.4F), Mild Pain (1 - 3)  ondansetron Injectable 4 milliGRAM(s) IV Push every 6 hours PRN Nausea and/or Vomiting      Vital Signs Last 24 Hrs  T(C): 36.9 (28 Oct 2023 00:20), Max: 36.9 (28 Oct 2023 00:20)  T(F): 98.4 (28 Oct 2023 00:20), Max: 98.4 (28 Oct 2023 00:20)  HR: 84 (27 Oct 2023 23:00) (74 - 108)  BP: 139/80 (27 Oct 2023 23:00) (95/79 - 157/63)  BP(mean): 90 (27 Oct 2023 23:00) (66 - 101)  RR: 20 (27 Oct 2023 23:00) (11 - 25)  SpO2: 100% (27 Oct 2023 23:00) (99% - 100%)    Parameters below as of 27 Oct 2023 16:00  Patient On (Oxygen Delivery Method): nasal cannula  O2 Flow (L/min): 2                          8.5    13.71 )-----------( 202      ( 27 Oct 2023 20:40 )             24.1   10-27    137  |  106  |  5.0<L>  ----------------------------<  111<H>  3.5   |  22.0  |  0.55    Ca    8.1<L>      27 Oct 2023 20:40  Phos  2.7     10-27  Mg     1.8     10-27    TPro  4.9<L>  /  Alb  2.8<L>  /  TBili  0.4  /  DBili  x   /  AST  23  /  ALT  11  /  AlkPhos  57  10-27      Exam:  Gen: pt lying in bed, alert, in NAD  Resp: unlabored  CVS: RRR  Abd: soft, NT, ND  Ext: moving all extremities spontaneously, sensation intact, pulses 2+

## 2023-10-28 NOTE — PROGRESS NOTE ADULT - ASSESSMENT
68-year-old female who presents with acute blood loss anemia due to presumed diverticular bleed      Diverticular bleed resolved  No active bleeding on colonoscopy  No evidence of recent bleeding on colonoscopy noted either  Continue diet as tolerated  Avoid NSAIDs  Hemoglobin stable  Colorectal surgery note appreciated, no active bleeding and no intervention required at this time continue remainder of care per primary team

## 2023-10-28 NOTE — PROGRESS NOTE ADULT - ASSESSMENT
67 y/o female with PMH of HTN, GERD, Live's esophagus, recent left TKR who was admitted to medicine service (10/24/23) for rectal bleed. Initial CTA abdomen on arrival showed long segment R sided pericolonic inflammatory changes which could reflect colitis/diverticulitis. Patient noted to have acute blood loss anemia; CTA abdomen repeated: revealed active bleeding in the left transverse colon from a diverticulum. Transferred to MICU. IR and GI on board. Patient s/p angiogram with IR, no active bleed seen. Colonoscopy done (10/27/23): no active bleed, severe diverticulosis of the whole colon and stage 1 internal hemorrhoids. S/p 4 units of PRBC. Hb: 8.5.     Acute GI bleed   Hb 8.5  S/p angiogram/colonoscopy: both showed no active bleed   GI on board   Monitor H/H; transfuse to keep Hb> 7     Leukocytosis  WBC: 13.71  Likely reactive in the setting of anemia   No clear source of infection   Trend      HTN   Metoprolol tartrate 12.5mg bid     GERD   PPI 40mg bid     Supportive   DVT prophylaxis: CSD   Diet: cardiac (will advance as rec by GI)     Plan of care discussed with patient.

## 2023-10-28 NOTE — PROGRESS NOTE ADULT - SUBJECTIVE AND OBJECTIVE BOX
67 y/o female with PMH of HTN, GERD, Live's esophagus, recent left TKR who was admitted to medicine service (10/24/23) for rectal bleed. Initial CTA abdomen on arrival showed long segment R sided pericolonic inflammatory changes which could reflect colitis/diverticulitis. Patient noted to have acute blood loss anemia; CTA abdomen repeated: revealed active bleeding in the left transverse colon from a diverticulum. Transferred to MICU. IR and GI on board. Patient s/p angiogram with IR, no active bleed seen. Colonoscopy done (10/27/23): no active bleed, severe diverticulosis of the whole colon and stage 1 internal hemorrhoids. S/p 4 units of PRBC. Hb: 8.5.     Patient seen and examined at bed side, not in acute distress. Patient reported moving her bowel but non-bloody.       PAST MEDICAL & SURGICAL HISTORY:  HTN (hypertension)    GERD (gastroesophageal reflux disease)    Unilateral primary osteoarthritis, left knee    Thyroid nodule    Prediabetes    H/O oral surgery    H/O tubal ligation    History of cholecystectomy    S/P excision of lipoma      REVIEW OF SYSTEMS: none       MEDICATIONS  (STANDING):  chlorhexidine 2% Cloths 1 Application(s) Topical daily  metoprolol tartrate 12.5 milliGRAM(s) Oral every 12 hours  pantoprazole  Injectable 40 milliGRAM(s) IV Push daily  piperacillin/tazobactam IVPB.. 3.375 Gram(s) IV Intermittent every 8 hours    MEDICATIONS  (PRN):  acetaminophen     Tablet .. 650 milliGRAM(s) Oral every 6 hours PRN Temp greater or equal to 38C (100.4F), Mild Pain (1 - 3)  ondansetron Injectable 4 milliGRAM(s) IV Push every 6 hours PRN Nausea and/or Vomiting      Allergies sulfa drugs (Hives)    SOCIAL HISTORY: no cigarette, drinks alcohol occasionally, no illicit drug. Lives with family.     FAMILY HISTORY:  FH: Parkinson's disease (Sibling)    FH: heart attack (Mother)    FH: diabetes mellitus (Sibling)    FH: heart disease (Sibling)    FH: lung cancer (Sibling)        Vital Signs Last 24 Hrs  T(C): 36.9 (28 Oct 2023 00:20), Max: 36.9 (28 Oct 2023 00:20)  T(F): 98.4 (28 Oct 2023 00:20), Max: 98.4 (28 Oct 2023 00:20)  HR: 84 (27 Oct 2023 23:00) (74 - 108)  BP: 139/80 (27 Oct 2023 23:00) (95/79 - 157/63)  BP(mean): 90 (27 Oct 2023 23:00) (66 - 101)  RR: 20 (27 Oct 2023 23:00) (11 - 25)  SpO2: 100% (27 Oct 2023 23:00) (99% - 100%)    Parameters below as of 27 Oct 2023 16:00  Patient On (Oxygen Delivery Method): nasal cannula  O2 Flow (L/min): 2      PHYSICAL EXAM:    Constitutional: laying comfortably in bed, not in acute distress.     Eyes: PERRLA     ENMT: moist oral mucosa     Respiratory: CTA b/l     Cardiovascular: s1,s2, RRR    Gastrointestinal: soft, non-distended, non-tender, BS+     Extremities: no edema     Neurological: awake, alert and oriented x 3, no focal deficit, responded well to verbal command     Skin: warm, no rash     Musculoskeletal: ROM intact     Psychiatric: normal mood and affect        LABS:                        8.5    13.71 )-----------( 202      ( 27 Oct 2023 20:40 )             24.1     10-27    137  |  106  |  5.0<L>  ----------------------------<  111<H>  3.5   |  22.0  |  0.55    Ca    8.1<L>      27 Oct 2023 20:40  Phos  2.7     10-27  Mg     1.8     10-27    TPro  4.9<L>  /  Alb  2.8<L>  /  TBili  0.4  /  DBili  x   /  AST  23  /  ALT  11  /  AlkPhos  57  10-27    PT/INR - ( 27 Oct 2023 20:40 )   PT: 11.6 sec;   INR: 1.05 ratio         PTT - ( 27 Oct 2023 20:40 )  PTT:25.9 sec  Urinalysis Basic - ( 27 Oct 2023 20:40 )    Color: x / Appearance: x / SG: x / pH: x  Gluc: 111 mg/dL / Ketone: x  / Bili: x / Urobili: x   Blood: x / Protein: x / Nitrite: x   Leuk Esterase: x / RBC: x / WBC x   Sq Epi: x / Non Sq Epi: x / Bacteria: x

## 2023-10-28 NOTE — PROGRESS NOTE ADULT - ASSESSMENT
A:  69 yo female with PSHx cholecystectomy  PMHx HTN, GERD, Barretts esophagus, recent L knee replacement who admitted to medicine of rectal bleed, patient with dropping H/H on presentation (10/24) H/H 10/33 (baseline 13) CT with no evidence of active bleed , patient continue to have bloody BM , H/H now dropped to 7, required 3 unit of PRBCs, 2 plasma and 1 platelet , on R CT scan patient found to have active bleeding on transverse colon , went with IR for angiogram without evidence of active bleed , patient continue to have bloody BM. Now s/p colonoscopy.     PLAN:    - transfuse per parameter   - Colonoscopy with pandiverticulosis but no active bleeding   -Recommend IR/repeat scope if rebleeds  -Surgical management only if continued transfusion requirement or instability  -Trend hb  -IVANA  -Monitor bowel function  -Surgery will continue to follow

## 2023-10-28 NOTE — PROGRESS NOTE ADULT - SUBJECTIVE AND OBJECTIVE BOX
Chief Complaint:  Patient is a 68y old  Female who presents with a chief complaint of gib (28 Oct 2023 01:59)      HPI/ 24 hr events: Patient seen and examined at bedside. She felt dizzy when she stood up for the first time after several days.  She denies any rectal bleeding.  She had a brown bowel movement this morning.  She denies nausea, vomiting, abdominal pain.  She reports discomfort after eating regular diet.    MEDICATIONS:   MEDICATIONS  (STANDING):  chlorhexidine 2% Cloths 1 Application(s) Topical daily  metoprolol tartrate 12.5 milliGRAM(s) Oral every 12 hours  pantoprazole  Injectable 40 milliGRAM(s) IV Push daily  piperacillin/tazobactam IVPB.. 3.375 Gram(s) IV Intermittent every 8 hours    MEDICATIONS  (PRN):  acetaminophen     Tablet .. 650 milliGRAM(s) Oral every 6 hours PRN Temp greater or equal to 38C (100.4F), Mild Pain (1 - 3)  ondansetron Injectable 4 milliGRAM(s) IV Push every 6 hours PRN Nausea and/or Vomiting      ALLERGIES:   Allergies    sulfa drugs (Hives)    Intolerances        VITAL SIGNS:   Vital Signs Last 24 Hrs  T(C): 36.7 (28 Oct 2023 05:26), Max: 36.9 (28 Oct 2023 00:20)  T(F): 98.1 (28 Oct 2023 05:26), Max: 98.4 (28 Oct 2023 00:20)  HR: 84 (28 Oct 2023 05:26) (75 - 108)  BP: 143/65 (28 Oct 2023 05:26) (102/58 - 154/69)  BP(mean): 90 (27 Oct 2023 23:00) (66 - 96)  RR: 18 (28 Oct 2023 05:26) (11 - 22)  SpO2: 98% (28 Oct 2023 05:26) (98% - 100%)    Parameters below as of 28 Oct 2023 05:26  Patient On (Oxygen Delivery Method): room air      I&O's Summary    27 Oct 2023 07:01  -  28 Oct 2023 07:00  --------------------------------------------------------  IN: 400 mL / OUT: 2502 mL / NET: -2102 mL        PHYSICAL EXAM:   GENERAL:  No acute distress  HEENT:  NC/AT, conjunctiva clear, sclera anicteric  CHEST:  No increased effort, breath sounds clear  HEART:  Regular rate   ABDOMEN:  Soft, non-tender, non-distended, normoactive bowel sounds, no rebound or guarding  EXTREMITIES: No edema  SKIN:  Warm, dry  NEURO:  Calm, cooperative    LABS:                        8.8    13.29 )-----------( 237      ( 28 Oct 2023 07:28 )             25.8     10-28    140  |  105  |  4.8<L>  ----------------------------<  97  3.4<L>   |  24.0  |  0.54    Ca    8.4      28 Oct 2023 07:28  Phos  3.3     10-  Mg     2.1     10-28    TPro  5.2<L>  /  Alb  3.2<L>  /  TBili  0.5  /  DBili  x   /  AST  17  /  ALT  11  /  AlkPhos  62  10-28    LIVER FUNCTIONS - ( 28 Oct 2023 07:28 )  Alb: 3.2 g/dL / Pro: 5.2 g/dL / ALK PHOS: 62 U/L / ALT: 11 U/L / AST: 17 U/L / GGT: x           PT/INR - ( 28 Oct 2023 07:28 )   PT: 11.8 sec;   INR: 1.07 ratio         PTT - ( 28 Oct 2023 07:28 )  PTT:26.6 sec  Urinalysis Basic - ( 28 Oct 2023 07:28 )    Color: x / Appearance: x / SG: x / pH: x  Gluc: 97 mg/dL / Ketone: x  / Bili: x / Urobili: x   Blood: x / Protein: x / Nitrite: x   Leuk Esterase: x / RBC: x / WBC x   Sq Epi: x / Non Sq Epi: x / Bacteria: x                                    RADIOLOGY & ADDITIONAL STUDIES:      ACC: [id number] EXAM:  CT ABDOMEN AND PELVIS WAW IC   ORDERED BY: ANTONIO CARVAJAL     PROCEDURE DATE:  10/25/2023          INTERPRETATION:  CLINICAL INFORMATION: Rectal bleeding    ADDITIONAL CLINICAL INFORMATION: Other, Non-specified    COMPARISON: None.    CONTRAST/COMPLICATIONS:  IV Contrast: Omnipaque 350  Oral Contrast: NONE  Complications: None reported at time of study completion    PROCEDURE:  CT of the Abdomen and Pelvis was performed.  Precontrast, Arterial and Delayed phases were performed.  Sagittal and coronal reformats were performed.    FINDINGS:  LOWER CHEST: Lung bases are clear. Mitral valvular calcifications.    LIVER: Right liver lobe hepatic cyst and subcentimeter hypodensities too   small to characterize.  BILE DUCTS: Normal caliber. CBD measures 1.2 cm postcholecystectomy.  GALLBLADDER: Cholecystectomy.  SPLEEN: Within normal limits.  PANCREAS: Within normal limits.  ADRENALS: Within normal limits.  KIDNEYS/URETERS: No renal stones or hydronephrosis. Symmetric homogeneous   enhancement.    BLADDER: Within normal limits.  REPRODUCTIVE ORGANS: Uterus myomatosis.    BOWEL: There is active contrast extravasation into the colonic lumen from   a left transverse colon diverticulum, best seen on image 8:36-39.   Extensive colonic diverticulosis in the descending and sigmoid colon. Few   diverticula demonstrate subtle inflammation, at the transition of the   descending to sigmoid colon. Increased mucosal enhancement in the   rectosigmoid suggest mild inflammation. Uncomplicated paraduodenal   diverticulum.  Complex intraluminal fluid in the transverse colon, ascending colon and   in the rectosigmoid. No bowel obstruction. Appendix is normal.  PERITONEUM: No ascites. No pneumoperitoneum.  VESSELS: Atherosclerotic changes.  RETROPERITONEUM/LYMPH NODES: No lymphadenopathy.  ABDOMINAL WALL: Anterior abdominal wall periumbilical hernia containing   fat. Small bilateral inguinal fat-containing hernia.  BONES: Degenerative changes.    IMPRESSION:  Active GI bleed in the left transverse colon from a diverticulum.   Consider IR consultation.    Extensive colonic diverticulosis in the descending and sigmoid colon with   additional subtle acute diverticulitis in the proximal sigmoid colon.    Findings were discussed with ADALID Roberts by Dr. Card    with   read back confirmation at 3:28;pm, 10/25/2023    --- End of Report ---            MARGAUX CARD MD; Attending Radiologist  This document has been electronically signed. Oct 25 2023  3:44PM  10-25-23 @ 15:23        Colonoscopy Report        Date: 10/27/2023        Patient Name: ДМИТРИЙ EISENBERG        MRN: [id number]        Account Number:        [id number]        Gender: Female         (age): 1955 (68)        Instrument(s):         [id number]        Attending/Fellow:        Tricia Delarosa                                ASA Class:        P3 - 10/27/2023 6:43 PM Tricia Delarosa        History of Present Illness:        H&P was previously reviewed.        Administered Medications:        As per Anesthesiology Record        Indications:        Gastrointestinal hemorrhage: 578.9 - K92.2        Procedure:        The procedure, indications, preparation and potential complications were    explained to the patient, who indicated understanding and signed the    corresponding consent forms. MAC was administered by the anesthesiologist.    Continuous pulse oximetry and blood pressure monitoring were used throughout the    procedure. Supplemental oxygen was used. Patient was placed in left lateral    decubitus position. Digital exam was normal. The colonoscope was introduced    through the rectum and advanced under direct visualization until cecum was    reached. The appendiceal orifice and the ileocecal valve were identified.    Careful visualization was performed as the instrument was withdrawn. Patient    tolerance to procedure was excellent. The procedure was not difficult.        Laona Bowel Preparation Score:        Using the Laona Bowel Preparation Score, each segment of the colon was graded    as follows:        Left Colon: Good, 2 | Transverse Colon: Good, 2  | Right Colon: Good, 2        Limitations/Complications:        There were no apparent limitations or complications        Findings:        Excavated lesions Multiple diverticula were seen in the whole colon.    Diverticulosis appeared to be severe.        Protruding lesions Grade/Stage I internal hemorrhoids were noted.        Impressions:        Severe diverticulosis of the whole colon.        Grade/Stage I internal hemorrhoids.        Plan:        Advance diet as tolerated        Pan colonic diverticulosis no active bleeding or stigmata of recent bleeding        Advance to regular diet        Tricia Delarosa        Version 1, Electronically signed on 10/27/2023 6:45:53 PM by Tricia Delarosa

## 2023-10-28 NOTE — CHART NOTE - NSCHARTNOTEFT_GEN_A_CORE
Pt seen and examined, without acute concerns.     VITALS:   T(C): 36.8 (10-28-23 @ 11:12), Max: 36.9 (10-28-23 @ 00:20)  HR: 77 (10-28-23 @ 11:12) (75 - 108)  BP: 135/75 (10-28-23 @ 11:12) (102/58 - 154/69)  RR: 18 (10-28-23 @ 11:12) (11 - 22)  SpO2: 97% (10-28-23 @ 11:12) (97% - 100%)    GENERAL: NAD, lying in bed comfortably  HEAD:  Atraumatic, Normocephalic  PULM: No rales, rhonchi, wheezing, or rubs. Unlabored respirations  HEART: Regular rate and rhythm; No LE edema  ABDOMEN: BSx4; Soft, nontender, nondistended  EXTREMITIES: Left knee incision site noted, c/d/i    #Acute GI bleed   Hb 8.8  S/p angiogram/colonoscopy: both showed no active bleed   GI on board   Monitor H/H; transfuse to keep Hb> 7   Diet advanced    #Leukocytosis  WBC: 13.71  No clear source of infection   Trend    Started on Zosyn given concern for possible colitis, will tx for 5 days    #Recent knee surgery  Avoid NSAIDs  PT eval  PRN Oxy  SCD    #HTN   Metoprolol tartrate 12.5mg bid     #GERD   PPI 40mg bid Pt seen and examined, without acute concerns.     VITALS:   T(C): 36.8 (10-28-23 @ 11:12), Max: 36.9 (10-28-23 @ 00:20)  HR: 77 (10-28-23 @ 11:12) (75 - 108)  BP: 135/75 (10-28-23 @ 11:12) (102/58 - 154/69)  RR: 18 (10-28-23 @ 11:12) (11 - 22)  SpO2: 97% (10-28-23 @ 11:12) (97% - 100%)    GENERAL: NAD, lying in bed comfortably  HEAD:  Atraumatic, Normocephalic  PULM: No rales, rhonchi, wheezing, or rubs. Unlabored respirations  HEART: Regular rate and rhythm; No LE edema  ABDOMEN: BSx4; Soft, nontender, nondistended  EXTREMITIES: Left knee incision site noted, c/d/i    #Acute GI bleed   Hb 8.8  S/p angiogram/colonoscopy: both showed no active bleed   GI on board   Monitor H/H; transfuse to keep Hb> 7   Diet advanced    #Leukocytosis  WBC: 13.71  No clear source of infection   Trend    Started on Zosyn given concern for possible colitis, will tx for 5 days    #Recent knee surgery  Avoid NSAIDs, if Hg remains stable, will trial ASA  PT eval  PRN Oxy  SCD    #HTN   Metoprolol tartrate 12.5mg bid     #GERD   PPI 40mg bid

## 2023-10-29 ENCOUNTER — TRANSCRIPTION ENCOUNTER (OUTPATIENT)
Age: 68
End: 2023-10-29

## 2023-10-29 LAB
ANION GAP SERPL CALC-SCNC: 9 MMOL/L — SIGNIFICANT CHANGE UP (ref 5–17)
ANION GAP SERPL CALC-SCNC: 9 MMOL/L — SIGNIFICANT CHANGE UP (ref 5–17)
BUN SERPL-MCNC: 7.5 MG/DL — LOW (ref 8–20)
BUN SERPL-MCNC: 7.5 MG/DL — LOW (ref 8–20)
CALCIUM SERPL-MCNC: 8.2 MG/DL — LOW (ref 8.4–10.5)
CALCIUM SERPL-MCNC: 8.2 MG/DL — LOW (ref 8.4–10.5)
CHLORIDE SERPL-SCNC: 106 MMOL/L — SIGNIFICANT CHANGE UP (ref 96–108)
CHLORIDE SERPL-SCNC: 106 MMOL/L — SIGNIFICANT CHANGE UP (ref 96–108)
CO2 SERPL-SCNC: 26 MMOL/L — SIGNIFICANT CHANGE UP (ref 22–29)
CO2 SERPL-SCNC: 26 MMOL/L — SIGNIFICANT CHANGE UP (ref 22–29)
CREAT SERPL-MCNC: 0.58 MG/DL — SIGNIFICANT CHANGE UP (ref 0.5–1.3)
CREAT SERPL-MCNC: 0.58 MG/DL — SIGNIFICANT CHANGE UP (ref 0.5–1.3)
EGFR: 99 ML/MIN/1.73M2 — SIGNIFICANT CHANGE UP
EGFR: 99 ML/MIN/1.73M2 — SIGNIFICANT CHANGE UP
GLUCOSE BLDC GLUCOMTR-MCNC: 122 MG/DL — HIGH (ref 70–99)
GLUCOSE BLDC GLUCOMTR-MCNC: 122 MG/DL — HIGH (ref 70–99)
GLUCOSE SERPL-MCNC: 113 MG/DL — HIGH (ref 70–99)
GLUCOSE SERPL-MCNC: 113 MG/DL — HIGH (ref 70–99)
HCT VFR BLD CALC: 25.9 % — LOW (ref 34.5–45)
HCT VFR BLD CALC: 25.9 % — LOW (ref 34.5–45)
HGB BLD-MCNC: 8.4 G/DL — LOW (ref 11.5–15.5)
HGB BLD-MCNC: 8.4 G/DL — LOW (ref 11.5–15.5)
MAGNESIUM SERPL-MCNC: 2.2 MG/DL — SIGNIFICANT CHANGE UP (ref 1.6–2.6)
MAGNESIUM SERPL-MCNC: 2.2 MG/DL — SIGNIFICANT CHANGE UP (ref 1.6–2.6)
MCHC RBC-ENTMCNC: 28.7 PG — SIGNIFICANT CHANGE UP (ref 27–34)
MCHC RBC-ENTMCNC: 28.7 PG — SIGNIFICANT CHANGE UP (ref 27–34)
MCHC RBC-ENTMCNC: 32.4 GM/DL — SIGNIFICANT CHANGE UP (ref 32–36)
MCHC RBC-ENTMCNC: 32.4 GM/DL — SIGNIFICANT CHANGE UP (ref 32–36)
MCV RBC AUTO: 88.4 FL — SIGNIFICANT CHANGE UP (ref 80–100)
MCV RBC AUTO: 88.4 FL — SIGNIFICANT CHANGE UP (ref 80–100)
PLATELET # BLD AUTO: 265 K/UL — SIGNIFICANT CHANGE UP (ref 150–400)
PLATELET # BLD AUTO: 265 K/UL — SIGNIFICANT CHANGE UP (ref 150–400)
POTASSIUM SERPL-MCNC: 3.8 MMOL/L — SIGNIFICANT CHANGE UP (ref 3.5–5.3)
POTASSIUM SERPL-MCNC: 3.8 MMOL/L — SIGNIFICANT CHANGE UP (ref 3.5–5.3)
POTASSIUM SERPL-SCNC: 3.8 MMOL/L — SIGNIFICANT CHANGE UP (ref 3.5–5.3)
POTASSIUM SERPL-SCNC: 3.8 MMOL/L — SIGNIFICANT CHANGE UP (ref 3.5–5.3)
RBC # BLD: 2.93 M/UL — LOW (ref 3.8–5.2)
RBC # BLD: 2.93 M/UL — LOW (ref 3.8–5.2)
RBC # FLD: 16.7 % — HIGH (ref 10.3–14.5)
RBC # FLD: 16.7 % — HIGH (ref 10.3–14.5)
SODIUM SERPL-SCNC: 141 MMOL/L — SIGNIFICANT CHANGE UP (ref 135–145)
SODIUM SERPL-SCNC: 141 MMOL/L — SIGNIFICANT CHANGE UP (ref 135–145)
WBC # BLD: 11.21 K/UL — HIGH (ref 3.8–10.5)
WBC # BLD: 11.21 K/UL — HIGH (ref 3.8–10.5)
WBC # FLD AUTO: 11.21 K/UL — HIGH (ref 3.8–10.5)
WBC # FLD AUTO: 11.21 K/UL — HIGH (ref 3.8–10.5)

## 2023-10-29 PROCEDURE — 99232 SBSQ HOSP IP/OBS MODERATE 35: CPT

## 2023-10-29 RX ORDER — ASPIRIN/CALCIUM CARB/MAGNESIUM 324 MG
81 TABLET ORAL
Refills: 0 | Status: DISCONTINUED | OUTPATIENT
Start: 2023-10-29 | End: 2023-10-29

## 2023-10-29 RX ORDER — ASPIRIN/CALCIUM CARB/MAGNESIUM 324 MG
81 TABLET ORAL
Refills: 0 | Status: DISCONTINUED | OUTPATIENT
Start: 2023-10-29 | End: 2023-10-30

## 2023-10-29 RX ADMIN — PANTOPRAZOLE SODIUM 40 MILLIGRAM(S): 20 TABLET, DELAYED RELEASE ORAL at 11:49

## 2023-10-29 RX ADMIN — Medication 12.5 MILLIGRAM(S): at 06:54

## 2023-10-29 RX ADMIN — CHLORHEXIDINE GLUCONATE 1 APPLICATION(S): 213 SOLUTION TOPICAL at 11:49

## 2023-10-29 RX ADMIN — Medication 81 MILLIGRAM(S): at 17:45

## 2023-10-29 RX ADMIN — PIPERACILLIN AND TAZOBACTAM 25 GRAM(S): 4; .5 INJECTION, POWDER, LYOPHILIZED, FOR SOLUTION INTRAVENOUS at 07:35

## 2023-10-29 RX ADMIN — PIPERACILLIN AND TAZOBACTAM 25 GRAM(S): 4; .5 INJECTION, POWDER, LYOPHILIZED, FOR SOLUTION INTRAVENOUS at 15:38

## 2023-10-29 RX ADMIN — PIPERACILLIN AND TAZOBACTAM 25 GRAM(S): 4; .5 INJECTION, POWDER, LYOPHILIZED, FOR SOLUTION INTRAVENOUS at 08:40

## 2023-10-29 RX ADMIN — Medication 12.5 MILLIGRAM(S): at 17:45

## 2023-10-29 NOTE — DISCHARGE NOTE PROVIDER - NSDCFUADDINST_GEN_ALL_CORE_FT
ORTHO: Continue DVT prophylaxis as prescribed, Aspirin 81mg twice a day until 11/17 or equivalent. Weight bearing as tolerated. Continue Physical therapy. Follow-up outpatient as scheduled with Dr. Jackson.

## 2023-10-29 NOTE — DISCHARGE NOTE PROVIDER - NSDCCPCAREPLAN_GEN_ALL_CORE_FT
PRINCIPAL DISCHARGE DIAGNOSIS  Diagnosis: GI bleed  Assessment and Plan of Treatment: Take your medications as prescribed by your Gastroenterologist.  If you have had an Endoscopy or Colonoscopy, follow up with your Gastroenterologist for Pathology results.  Avoid NSAIDs unless your Health Care Provider tells you that it is ok (Aspirin, Ibuprofen, Advil, Motrin, Aleve).  Follow up with your Gastroenterologist within 1-2 weeks of discharge.      SECONDARY DISCHARGE DIAGNOSES  Diagnosis: Anemia due to acute blood loss  Assessment and Plan of Treatment:     Diagnosis: S/P arthroscopy of knee  Assessment and Plan of Treatment:

## 2023-10-29 NOTE — DISCHARGE NOTE PROVIDER - NSDCFUSCHEDAPPT_GEN_ALL_CORE_FT
Benedict Jackson [facility]  ORTHOSURG Jeff GUIDO  Scheduled Appointment: 11/16/2023    Benedict Jackson [facility]  ORTHORMELVI GUIDO  Scheduled Appointment: 12/28/2023

## 2023-10-29 NOTE — PROGRESS NOTE ADULT - ASSESSMENT
A: 69 yo F with PSHx cholecystectomy  PMHx HTN, GERD, Barretts esophagus, recent L knee replacement who admitted to medicine of rectal bleed, patient with dropping H/H on presentation (10/24) H/H 10/33 (baseline 13) CT with no evidence of active bleed , patient continue to have bloody BM , H/H now dropped to 7, required 3 unit of PRBCs, 2 plasma and 1 platelet, on R CT scan patient found to have active bleeding on transverse colon , went with IR for angiogram without evidence of active bleed , patient continue to have bloody BM. Now s/p colonoscopy. No reported bloody bowel movements today,     PLAN:    - transfuse per parameter   - Colonoscopy 10/27 with pandiverticulosis but no active bleeding   - Recommend IR/repeat scope if rebleeds  - Surgical management only if continued transfusion requirement or instability  - Trend Hgb  - IVANA  - Monitor bowel function as diet advanced  - Surgery will continue to follow

## 2023-10-29 NOTE — DISCHARGE NOTE NURSING/CASE MANAGEMENT/SOCIAL WORK - PATIENT PORTAL LINK FT
You can access the FollowMyHealth Patient Portal offered by [facility] by registering at the following website: [email or web address]. By joining [facility]’s FollowMyHealth portal, you will also be able to view your health information using other applications (apps) compatible with our system.

## 2023-10-29 NOTE — DISCHARGE NOTE PROVIDER - HOSPITAL COURSE
69 y/o female with PMH of HTN, GERD, Live's esophagus, recent left TKR who was admitted to medicine service (10/24/23) for rectal bleed. Initial CTA abdomen on arrival showed long segment R sided pericolonic inflammatory changes which could reflect colitis/diverticulitis. Patient noted to have acute blood loss anemia; CTA abdomen repeated: revealed active bleeding in the left transverse colon from a diverticulum. Transferred to MICU. IR and GI on board. Patient s/p angiogram with IR, no active bleed seen. Colonoscopy done (10/27/23): no active bleed, severe diverticulosis of the whole colon and stage 1 internal hemorrhoids. S/p 4 units of PRBC. With stable hemoglobin. Also started on ASA BID and CBC monitored without drops. Seen by PT and ortho (given recent knee surgery), now stable for discharge home.

## 2023-10-29 NOTE — DISCHARGE NOTE PROVIDER - NSDCMRMEDTOKEN_GEN_ALL_CORE_FT
aspirin 81 mg oral delayed release tablet: 1 tab(s) orally 2 times a day  Bystolic 5 mg oral tablet: 1 tab(s) orally once a day  meloxicam 7.5 mg oral tablet: 1 tab(s) orally 2 times a day  NexIUM 40 mg oral powder for reconstitution, delayed release: 1 each orally once a day  Senna S 50 mg-8.6 mg oral tablet: 2 tab(s) orally once a day (at bedtime)  Vitamin D3 1250 mcg (50,000 intl units) oral capsule: 1 cap(s) orally once a week   aspirin 81 mg oral delayed release tablet: 1 tab(s) orally 2 times a day  Bystolic 5 mg oral tablet: 1 tab(s) orally once a day  NexIUM 40 mg oral powder for reconstitution, delayed release: 1 each orally once a day  Senna S 50 mg-8.6 mg oral tablet: 2 tab(s) orally once a day (at bedtime)  Vitamin D3 1250 mcg (50,000 intl units) oral capsule: 1 cap(s) orally once a week

## 2023-10-29 NOTE — DISCHARGE NOTE PROVIDER - CARE PROVIDER_API CALL
Benedict Jackson  Orthopaedic Surgery  [address] [place] [address]  Phone: [phone]  Fax: [phone]  Follow Up Time:    Benedict Jackson  Orthopaedic Surgery  [address] [place] [address]  Phone: [phone]  Fax: [phone]  Follow Up Time:     Tricia Delarosa  Gastroenterology  [address] [facility], [address]  [place] [address]  Phone: [phone]  Fax: [phone]  Follow Up Time:

## 2023-10-29 NOTE — PROGRESS NOTE ADULT - SUBJECTIVE AND OBJECTIVE BOX
Nick Plummer M.D.    Patient is a 68y old  Female who presents with a chief complaint of gastrointestinal bleed (29 Oct 2023 04:37)      SUBJECTIVE / OVERNIGHT EVENTS: no concerns. Tolerated diet overnight.     Patient denies chest pain, SOB, abd pain, N/V, fever, chills, dysuria or any other complaints. All remainder ROS negative.     MEDICATIONS  (STANDING):  aspirin  chewable 81 milliGRAM(s) Oral two times a day  chlorhexidine 2% Cloths 1 Application(s) Topical daily  metoprolol tartrate 12.5 milliGRAM(s) Oral every 12 hours  pantoprazole  Injectable 40 milliGRAM(s) IV Push daily  piperacillin/tazobactam IVPB.. 3.375 Gram(s) IV Intermittent every 8 hours    MEDICATIONS  (PRN):  ondansetron Injectable 4 milliGRAM(s) IV Push every 6 hours PRN Nausea and/or Vomiting  oxycodone    5 mG/acetaminophen 325 mG 1 Tablet(s) Oral every 6 hours PRN Moderate Pain (4 - 6) and Severe Pain (7-10)s      I&O's Summary    28 Oct 2023 07:01  -  29 Oct 2023 07:00  --------------------------------------------------------  IN: 740 mL / OUT: 0 mL / NET: 740 mL        PHYSICAL EXAM:  Vital Signs Last 24 Hrs  T(C): 37.2 (29 Oct 2023 09:16), Max: 37.2 (29 Oct 2023 09:16)  T(F): 99 (29 Oct 2023 09:16), Max: 99 (29 Oct 2023 09:16)  HR: 62 (29 Oct 2023 09:16) (62 - 87)  BP: 122/74 (29 Oct 2023 09:16) (122/74 - 151/79)  BP(mean): --  RR: 18 (29 Oct 2023 09:16) (18 - 18)  SpO2: 98% (29 Oct 2023 09:16) (95% - 98%)    Parameters below as of 29 Oct 2023 09:16  Patient On (Oxygen Delivery Method): room air      GENERAL: NAD, lying in bed comfortably  HEAD:  Atraumatic, Normocephalic  PULM: No rales, rhonchi, wheezing, or rubs. Unlabored respirations  HEART: Regular rate and rhythm; No LE edema  ABDOMEN: BSx4; Soft, nontender, nondistended  EXTREMITIES: Left knee incision site noted, c/d/i    LABS:                        8.4    11.21 )-----------( 265      ( 29 Oct 2023 05:08 )             25.9     10-29    141  |  106  |  7.5<L>  ----------------------------<  113<H>  3.8   |  26.0  |  0.58    Ca    8.2<L>      29 Oct 2023 05:08  Phos  3.3     10-28  Mg     2.2     10-29    TPro  5.2<L>  /  Alb  3.2<L>  /  TBili  0.5  /  DBili  x   /  AST  17  /  ALT  11  /  AlkPhos  62  10-28    PT/INR - ( 28 Oct 2023 07:28 )   PT: 11.8 sec;   INR: 1.07 ratio         PTT - ( 28 Oct 2023 07:28 )  PTT:26.6 sec      Urinalysis Basic - ( 29 Oct 2023 05:08 )    Color: x / Appearance: x / SG: x / pH: x  Gluc: 113 mg/dL / Ketone: x  / Bili: x / Urobili: x   Blood: x / Protein: x / Nitrite: x   Leuk Esterase: x / RBC: x / WBC x   Sq Epi: x / Non Sq Epi: x / Bacteria: x        CAPILLARY BLOOD GLUCOSE          RADIOLOGY & ADDITIONAL TESTS:  Results Reviewed:   Imaging Personally Reviewed:  Electrocardiogram Personally Reviewed:

## 2023-10-29 NOTE — PROGRESS NOTE ADULT - TIME BILLING
Patient seen and examined, chart reviewed.  No acute issues overnight.  Colonoscopy yesterday without evidence of bleeding.  Denies any further gross evidence of bleeding with her BMs.  Tolerating diet.  On exam abdomen soft, nondistended, minimally tender LLQ only on deep palpation, but no rebound/guarding.  Hgb stable from yesterday.    -- Clinically no evidence of further GI bleeding  -- Diet as tolerated  -- No urgent surgical intervention at this time.  We discussed how surgery would be a last line option/could be considered should this become a more severe issue with continued transfusion requirements or hemodynamic instability.  She understands.  -- Transfuse as needed, supportive care  -- Will follow
Negative angiogram.  Awaiting colonoscopy today.  Will followup results.
Patient denies any further gross blood per rectum.  Hgb stable.  Tolerating diet, denies abdominal pain.  On exam abdomen is soft, nondistended, nontender, no rebound/guarding.  Labs reviewed - over last three days Hgb 8.5 --> 8.8 --> 8.4, stable.    -- Clinically no current evidence of ongoing bleeding  -- Diet as tolerated  -- No urgent surgical intervention at this time  -- Will follow peripherally.  Please call with any significant changes in patient status.  Patient also encouraged to have Surgery team called back if she re-develops any bleeding.

## 2023-10-29 NOTE — CONSULT NOTE ADULT - NS ATTEND AMEND GEN_ALL_CORE FT
agree /w above
Pt seen and examined at bedside. I agree with assessment and plan as above. Pt with history of diverticulosis presenting with several episodes of dark red blood with clots and diarrhea since this morning. She reports episode of R sided abdominal pain over one month ago for which outpatient CT was obtained and showed mild diveritculitis. She was told to eat more fiber and pain resolved. At time of GI evaluation, there is dark red watery bm in bedpan at bedside mixed with clots. CTA negative for active bleed, shows underdistended stomach with extensive diverticulosis, e/o possible mild R sided colitis. Pt with Hgb of 10.9 from baseline 12's. Pending transfusion pRBC due to symptomatic anemia. Upper GI bleed is less likely, however give IV PPI BID and will plan for EGD/colonoscopy tomorrow. If patient is able to tolerate preparation (no nausea or dizziness) please give Golytely and keep NPO for colonoscopy tomorrow.

## 2023-10-29 NOTE — PROGRESS NOTE ADULT - SUBJECTIVE AND OBJECTIVE BOX
Subjective: Patient resting comfortably in bed. No acute events overnight so far. No other issues or complaints, no reported bloody bowel movements.       MEDICATIONS  (STANDING):  chlorhexidine 2% Cloths 1 Application(s) Topical daily  metoprolol tartrate 12.5 milliGRAM(s) Oral every 12 hours  pantoprazole  Injectable 40 milliGRAM(s) IV Push daily  piperacillin/tazobactam IVPB.. 3.375 Gram(s) IV Intermittent every 8 hours    MEDICATIONS  (PRN):  ondansetron Injectable 4 milliGRAM(s) IV Push every 6 hours PRN Nausea and/or Vomiting  oxycodone    5 mG/acetaminophen 325 mG 1 Tablet(s) Oral every 6 hours PRN Moderate Pain (4 - 6) and Severe Pain (7-10)s      Vital Signs Last 24 Hrs  T(C): 36.8 (28 Oct 2023 21:35), Max: 36.8 (28 Oct 2023 11:12)  T(F): 98.3 (28 Oct 2023 21:35), Max: 98.3 (28 Oct 2023 11:12)  HR: 82 (28 Oct 2023 21:35) (70 - 84)  BP: 145/79 (28 Oct 2023 21:35) (135/75 - 151/79)  BP(mean): --  RR: 18 (28 Oct 2023 21:35) (18 - 18)  SpO2: 98% (28 Oct 2023 21:35) (95% - 98%)    Parameters below as of 28 Oct 2023 21:35  Patient On (Oxygen Delivery Method): room air        Physical Exam:    Constitutional: NAD  HEENT: EOMI  Respiratory: Respirations non-labored, no accessory muscle use  Gastrointestinal: Soft, non-tender, non-distended  Neurological: A&O x 3      LABS:                        8.8    13.29 )-----------( 237      ( 28 Oct 2023 07:28 )             25.8     10-28    140  |  105  |  4.8<L>  ----------------------------<  97  3.4<L>   |  24.0  |  0.54    Ca    8.4      28 Oct 2023 07:28  Phos  3.3     10-28  Mg     2.1     10-28    TPro  5.2<L>  /  Alb  3.2<L>  /  TBili  0.5  /  DBili  x   /  AST  17  /  ALT  11  /  AlkPhos  62  10-28    PT/INR - ( 28 Oct 2023 07:28 )   PT: 11.8 sec;   INR: 1.07 ratio         PTT - ( 28 Oct 2023 07:28 )  PTT:26.6 sec  Urinalysis Basic - ( 28 Oct 2023 07:28 )    Color: x / Appearance: x / SG: x / pH: x  Gluc: 97 mg/dL / Ketone: x  / Bili: x / Urobili: x   Blood: x / Protein: x / Nitrite: x   Leuk Esterase: x / RBC: x / WBC x   Sq Epi: x / Non Sq Epi: x / Bacteria: x        A:     Plan:   -

## 2023-10-29 NOTE — PHYSICAL THERAPY INITIAL EVALUATION ADULT - ADDITIONAL COMMENTS
Pt lives in a house with 5 steps to enter with  rails and 0  stairs inside with  rails.  Pt owns medical equipment:  RW  Pt lives with: Spouse  Someone is always available to provide assist.

## 2023-10-29 NOTE — DISCHARGE NOTE PROVIDER - ATTENDING DISCHARGE PHYSICAL EXAMINATION:
GENERAL: NAD, lying in bed comfortably  HEAD:  Atraumatic, Normocephalic  PULM: No rales, rhonchi, wheezing, or rubs. Unlabored respirations  HEART: Regular rate and rhythm; No LE edema  ABDOMEN: BSx4; Soft, nontender, nondistended  EXTREMITIES: Left knee incision site noted, c/d/i   GENERAL: NAD, lying in bed comfortably  HEAD:  Atraumatic, Normocephalic  PULM: No rales, rhonchi, wheezing, or rubs. Unlabored respirations  HEART: Regular rate and rhythm; No LE edema  ABDOMEN: BSx4; Soft, nontender, nondistended  EXTREMITIES: Left knee incision site noted, c/d/i    Vital Signs Last 24 Hrs  T(C): 36.7 (31 Oct 2023 04:20), Max: 36.8 (30 Oct 2023 23:20)  T(F): 98.1 (31 Oct 2023 04:20), Max: 98.2 (30 Oct 2023 23:20)  HR: 96 (31 Oct 2023 04:20) (89 - 100)  BP: 156/74 (31 Oct 2023 04:20) (151/65 - 156/74)  BP(mean): --  RR: 18 (31 Oct 2023 04:20) (18 - 19)  SpO2: 98% (31 Oct 2023 04:20) (98% - 100%)    Parameters below as of 31 Oct 2023 04:20  Patient On (Oxygen Delivery Method): room air

## 2023-10-29 NOTE — CONSULT NOTE ADULT - SUBJECTIVE AND OBJECTIVE BOX
Patient is a 69 yo female with PMHx HTN, GERD, Barretts esophagus, recent L knee replacement who presented with rectal bleeding. Patient initial CTA abdomen did not demonstrate active bleeding, showed long segment R sided pericolonic inflammatory changes which could reflect colitis/diverticulitis. Patient was given 2 units PRBC.       Acute events: Patient had a bright red BM, felt lightheaded, and became tachycardic. Patient was initially scheduled for colonoscopy today. Patient hemoglobin dropped to 7.2 which responded to 1 unit PRBC and improved to 8.4. Patient had further bleeding with clots, s/p 1 more unit PRBC. Patient repeat CT showed active GIB in the L transverse colon from a diverticulum. Patient was taken to IR suite where mesenteric angiogram did not show further active bleed. Patient was given 2 units PRBC, 1 FFP, 1 unit PLT. Embolization was not performed. Admitted to medical ICU. Patient was intubated, on propofol at 75 currently; no pressors.         PAST MEDICAL & SURGICAL HISTORY:  HTN (hypertension)      GERD (gastroesophageal reflux disease)      Unilateral primary osteoarthritis, left knee      Thyroid nodule      Prediabetes      H/O oral surgery      H/O tubal ligation      History of cholecystectomy      S/P excision of lipoma        ДМИТРИЙ EISENBERG   68y    Female    BRIEF HOSPITAL COURSE:    Review of Systems: Patient currently intubated. All other ROS are negative.    Allergies    sulfa drugs (Hives)    Intolerances          ICU Vital Signs Last 24 Hrs  T(C): 37 (25 Oct 2023 12:00), Max: 37 (25 Oct 2023 07:13)  T(F): 98.6 (25 Oct 2023 12:00), Max: 98.6 (25 Oct 2023 07:13)  HR: 115 (25 Oct 2023 14:00) (80 - 138)  BP: 143/71 (25 Oct 2023 14:00) (111/50 - 151/75)  BP(mean): 85 (25 Oct 2023 14:00) (65 - 85)  ABP: --  ABP(mean): --  RR: 18 (25 Oct 2023 14:00) (14 - 40)  SpO2: 100% (25 Oct 2023 14:00) (100% - 100%)    O2 Parameters below as of 25 Oct 2023 14:00  Patient On (Oxygen Delivery Method): nasal cannula  O2 Flow (L/min): 2        Physical Examination:  Gen: Intubated. no acute distress  Head: normocephalic, atraumatic  Lung: CTAB, no respiratory distress  CV: normal s1/s2, rrr,   Abd: soft, non-tender, non-distended  MSK: No edema, no visible deformities  Neuro: Intubated, sedated   Skin: No rash   \        ABG - ( 25 Oct 2023 16:32 )  pH, Arterial: 7.330 pH, Blood: x     /  pCO2: 43    /  pO2: 231   / HCO3: 23    / Base Excess: -3.2  /  SaO2: 99.7                  LABS:                        8.4    12.93 )-----------( 329      ( 25 Oct 2023 11:08 )             24.8     10-24    140  |  103  |  20.8<H>  ----------------------------<  147<H>  3.9   |  22.0  |  0.57    Ca    9.0      24 Oct 2023 09:55    TPro  6.7  /  Alb  3.7  /  TBili  0.4  /  DBili  x   /  AST  16  /  ALT  12  /  AlkPhos  106  10-24          CAPILLARY BLOOD GLUCOSE        PT/INR - ( 24 Oct 2023 09:55 )   PT: 11.4 sec;   INR: 1.03 ratio         PTT - ( 24 Oct 2023 09:55 )  PTT:28.2 sec  Urinalysis Basic - ( 24 Oct 2023 09:55 )    Color: x / Appearance: x / SG: x / pH: x  Gluc: 147 mg/dL / Ketone: x  / Bili: x / Urobili: x   Blood: x / Protein: x / Nitrite: x   Leuk Esterase: x / RBC: x / WBC x   Sq Epi: x / Non Sq Epi: x / Bacteria: x      CULTURES:      Medications:  MEDICATIONS  (STANDING):  lactated ringers Bolus 1000 milliLiter(s) IV Bolus once  metoprolol tartrate 12.5 milliGRAM(s) Oral every 12 hours  pantoprazole  Injectable 40 milliGRAM(s) IV Push every 12 hours  piperacillin/tazobactam IVPB.. 3.375 Gram(s) IV Intermittent every 8 hours  sodium chloride 0.9%. 1000 milliLiter(s) (100 mL/Hr) IV Continuous <Continuous>    MEDICATIONS  (PRN):  acetaminophen     Tablet .. 650 milliGRAM(s) Oral every 6 hours PRN Temp greater or equal to 38C (100.4F), Mild Pain (1 - 3)        10-24 @ 07:01  -  10-25 @ 07:00  --------------------------------------------------------  IN: 700 mL / OUT: 0 mL / NET: 700 mL     
  HPI: 69 yo female with PSHx cholecystectomy  PMHx HTN, GERD, Barretts esophagus, recent L knee replacement who admitted to medicine of rectal bleed, patient with dropping H/H on presentation (10/24) H/H 10/33 (baseline 13) CT with no evidence of active bleed , patient continue to have bloody BM , H/H now dropped to 7, required 3 unit of PRBCs, 2 plasma and 1 platelet , on R CT scan patient found to have active bleeding on transverse colon , went with IR for angiogram without evidence of active bleed , patient continue to have bloody BM. patient last colonoscopy was 12/2022 showed diverticulosis.      ROS: 10-system review is otherwise negative except HPI above.      PAST MEDICAL & SURGICAL HISTORY:  HTN (hypertension)      GERD (gastroesophageal reflux disease)      Unilateral primary osteoarthritis, left knee      Thyroid nodule      Prediabetes      H/O oral surgery      H/O tubal ligation      History of cholecystectomy      S/P excision of lipoma        FAMILY HISTORY:  FH: Parkinson's disease (Sibling)    FH: heart attack (Mother)    FH: diabetes mellitus (Sibling)    FH: heart disease (Sibling)    FH: lung cancer (Sibling)      Family history not pertinent as reviewed with the patient.    SOCIAL HISTORY:  Denies any toxic habits    ALLERGIES: NKA sulfa drugs (Hives)      HOME MEDICATIONS:   Home Medications:  acetaminophen 325 mg oral tablet: 2 tab(s) orally every 6 hours (07 Oct 2023 10:07)  Biotin:  (06 Oct 2023 06:36)  Bystolic 5 mg oral tablet: 1 tab(s) orally once a day (06 Oct 2023 06:36)  Calcium 500 mg daily:  (06 Oct 2023 06:36)  NexIUM 40 mg oral powder for reconstitution, delayed release: 1 each orally once a day (06 Oct 2023 06:36)  Vitamin D3 1250 mcg (50,000 intl units) oral capsule: 1 cap(s) orally once a week (06 Oct 2023 06:36)  Zinc:  (06 Oct 2023 06:36)      --------------------------------------------------------------------------------------------    PHYSICAL EXAM:   General: NAD, Lying in bed comfortably  Neuro: A+Ox3  HEENT: EOMI, PERRLA, MMM  Cardio: RRR  Resp: Non labored breathing on RA  GI/Abd: Soft, NT/ND, no rebound/guarding, no masses palpated  Vascular: All 4 extremities warm and well perfused.   Pelvis: stable  Musculoskeletal: All 4 extremities moving spontaneously, no limitations, no spinal tenderness.  --------------------------------------------------------------------------------------------    LABS                 8.0    19.58  )----------(  254       ( 26 Oct 2023 09:00 )               22.8      142    |  109    |  21.5   ----------------------------<  144        ( 26 Oct 2023 02:05 )  3.4     |  20.0   |  0.69     Ca    7.6        ( 26 Oct 2023 02:05 )  Phos  3.7       ( 26 Oct 2023 02:05 )  Mg     1.8       ( 26 Oct 2023 02:05 )    TPro  4.7    /  Alb  2.9    /  TBili  0.4    /  DBili  x      /  AST  14     /  ALT  8      /  AlkPhos  62     ( 25 Oct 2023 19:00 )    LIVER FUNCTIONS - ( 25 Oct 2023 19:00 )  Alb: 2.9 g/dL / Pro: 4.7 g/dL / ALK PHOS: 62 U/L / ALT: 8 U/L / AST: 14 U/L / GGT: x           PT/INR -  13.5 sec / 1.22 ratio   ( 25 Oct 2023 19:00 )       PTT -  26.0 sec   ( 25 Oct 2023 19:00 )  CAPILLARY BLOOD GLUCOSE        Urinalysis Basic - ( 26 Oct 2023 02:05 )    Color: x / Appearance: x / SG: x / pH: x  Gluc: 144 mg/dL / Ketone: x  / Bili: x / Urobili: x   Blood: x / Protein: x / Nitrite: x   Leuk Esterase: x / RBC: x / WBC x   Sq Epi: x / Non Sq Epi: x / Bacteria: x      ABG - ( 25 Oct 2023 21:55 )  pH: 7.610 /  pCO2: <20   /  pO2: 141   / HCO3: 19    / Base Excess: -2.3  /  SaO2: 100.0               --------------------------------------------------------------------------------------------  IMAGING  < from: CT Abdomen and Pelvis w/wo IV Cont (10.25.23 @ 15:23) >  IMPRESSION:  Active GI bleed in the left transverse colon from a diverticulum.   Consider IR consultation.    Extensive colonic diverticulosis in the descending and sigmoid colon with   additional subtle acute diverticulitis in the proximal sigmoid colon.    Findings were discussed with ADALID Roberts by Dr. Card    with   read back confirmation at 3:28;pm, 10/25/2023    --- End of Report ---    < end of copied text >      
HISTORY OF PRESENT ILLNESS: 69 yo F PMH of HTN, GERD, pre-diabetes, recent left knee replacement (10/06/23) presented to Ed after rectal bleed. Patient reports 4-5 episodes of BRBP with clots from this morning. Her baseline hemoglobin was 13 gm on 09/18/23 and was 12 gm on 10/08 post knee replacement. Patient reports generalized weakness and "feeling dizzy". Noted to have large volume BRBPR with clots on exam. Patient reports use of Meloxicam 7.5 mg BID for the past 3 weeks. Not on any anticoagulation. Take baby aspirin. Denies excessive use of alcohol. Patient reports right sided abdominal pain 2 weeks ago and for which she had a CT abd and that showed ?diverticulosis/diverticulitis. She follows with gastroenterologist Dr. Richards and her last EGD/ Colonoscopy on 12/2023 reportedly showed ?diverticulosis. She has history of Live's and GERD, endorses complains with Omeprazole. In ED CTA abd pelv showed extensive colonic diverticulosis. Mild long segment right-sided pericolonic inflammatory changes could reflect colitis or mild diverticulitis.       Review of Systems:  . Constitutional: No fever, chills  . HEENT: Negative  · Respiratory and Thorax: No shortness of breath, no cough  · Cardiovascular: No chest pain, palpitation,  dizziness  · Gastrointestinal: see above  · Genitourinary: No hematuria  · Musculoskeletal: Negative  · Neurological: negative  · Psychiatric: no agitation, no anxiety      PAST MEDICAL/SURGICAL HISTORY:  HTN (hypertension)    GERD (gastroesophageal reflux disease)    Unilateral primary osteoarthritis, left knee    Thyroid nodule    Prediabetes    H/O oral surgery    H/O tubal ligation    History of cholecystectomy    S/P excision of lipoma      SOCIAL HISTORY:  - TOBACCO: Denies  - ALCOHOL: Denies  - ILLICIT DRUG USE: Denies    FAMILY HISTORY:  No known history of gastrointestinal or liver disease;  FH: Parkinson's disease (Sibling)    FH: heart attack (Mother)    FH: diabetes mellitus (Sibling)    FH: heart disease (Sibling)    FH: lung cancer (Sibling)        HOME MEDICATIONS:  acetaminophen 325 mg oral tablet: 2 tab(s) orally every 6 hours (07 Oct 2023 10:07)  Biotin:  (06 Oct 2023 06:36)  Bystolic 5 mg oral tablet: 1 tab(s) orally once a day (06 Oct 2023 06:36)  Calcium 500 mg daily:  (06 Oct 2023 06:36)  NexIUM 40 mg oral powder for reconstitution, delayed release: 1 each orally once a day (06 Oct 2023 06:36)  Vitamin D3 1250 mcg (50,000 intl units) oral capsule: 1 cap(s) orally once a week (06 Oct 2023 06:36)  Zinc:  (06 Oct 2023 06:36)    INPATIENT MEDICATIONS:  MEDICATIONS  (STANDING):    MEDICATIONS  (PRN):    ALLERGIES:  sulfa drugs (Hives)    T(C): 36.5 (10-24-23 @ 09:04), Max: 36.5 (10-24-23 @ 09:04)  HR: 89 (10-24-23 @ 09:04) (89 - 89)  BP: 126/56 (10-24-23 @ 09:04) (126/56 - 126/56)  RR: 16 (10-24-23 @ 09:04) (16 - 16)  SpO2: 99% (10-24-23 @ 09:04) (99% - 99%)      PHYSICAL EXAM:  Constitutional: No acute distress  Neuro: Awake alert, oriented  HEENT: anicteric sclerae  CV: regular rate, regular rhythm  Pulm/chest: lung sounds clear/diminished bilaterally, no accessory muscle use noted  Abd: soft, nontender, nondistended +bowel sounds. No rigidity, rebound tenderness, or guarding  Rectal exam: Not performed, large volume rectal bleed with clots on bedpan   Ext: no edema  Skin: warm, no jaundice   Psych: calm, cooperative          LABS:             10.9   18.79 )-----------( 554      ( 10-24 @ 09:55 )             33.2       PT/INR - ( 24 Oct 2023 09:55 )   PT: 11.4 sec;   INR: 1.03 ratio         PTT - ( 24 Oct 2023 09:55 )  PTT:28.2 sec  10-24    140  |  103  |  20.8<H>  ----------------------------<  147<H>  3.9   |  22.0  |  0.57    Ca    9.0      24 Oct 2023 09:55    TPro  6.7  /  Alb  3.7  /  TBili  0.4  /  DBili  x   /  AST  16  /  ALT  12  /  AlkPhos  106  10-24    LIVER FUNCTIONS - ( 24 Oct 2023 09:55 )  Alb: 3.7 g/dL / Pro: 6.7 g/dL / ALK PHOS: 106 U/L / ALT: 12 U/L / AST: 16 U/L / GGT: x               Urinalysis Basic - ( 24 Oct 2023 09:55 )    Color: x / Appearance: x / SG: x / pH: x  Gluc: 147 mg/dL / Ketone: x  / Bili: x / Urobili: x   Blood: x / Protein: x / Nitrite: x   Leuk Esterase: x / RBC: x / WBC x   Sq Epi: x / Non Sq Epi: x / Bacteria: x      < from: CT Abdomen and Pelvis w/wo IV Cont (10.24.23 @ 12:08) >  ADDITIONAL CLINICALINFORMATION: Other, Non-specified    COMPARISON: None.    CONTRAST/COMPLICATIONS:  IV Contrast: Omnipaque 350  90 cc administered   10 cc discarded  Oral Contrast: NONE  Complications: None reported at time of study completion    PROCEDURE:  CT of the Abdomen and Pelvis was performed.  Precontrast, Arterial and Delayed phases were performed.  Sagittal and coronal reformats were performed.    FINDINGS:    LOWER CHEST: No visualized pleural effusion. Small hiatal hernia.    LIVER: Liver size withinnormal limits, 17 cm in craniocaudal dimension.   Inferior right hepatic lobe cyst. Few small hepatic hypodensities, too   small to characterize.  BILE DUCTS: Common bile duct prominent size measuring 11 mm may be due to   postcholecystectomy status.Correlate with LFTs.  GALLBLADDER: Cholecystectomy.  SPLEEN: Spleen size within normal limits  PANCREAS: No acute peripancreatic inflammation  ADRENALS: Unremarkable  KIDNEYS/URETERS: No hydronephrosis. Subcentimeter renal hypodensities are   too small to characterize.    BLADDER: Minimally distended.  REPRODUCTIVE ORGANS: Myomatous uterus. Uterus and adnexa are otherwise   suboptimally characterized on CT.    BOWEL: Small hiatal hernia. Stomach is underdistended. Small   periampullary duodenal diverticulum. No small bowel distention. Appendix   is not obstructed. Mild stool burden of the colon and overall under   distention limits evaluation of the colonic mucosa. Extensive colonic   diverticulosis. Mild long segment right-sided pericolonic inflammatory   changes could reflect colitis or mild diverticulitis. No active   extravasation of contrast is identified.  PERITONEUM: No ascites.  VESSELS: No abdominal aortic aneurysm. Atheromatous changes. Soft tissue   surrounding the celiac artery, of unclear significance, measuring 12 x 7   mm on image 23 series 11.  RETROPERITONEUM/LYMPH NODES: Small volume nodes.  ABDOMINAL WALL: Small right paracentral fat containing ventral hernia.   Small fat-containing inguinal hernias.  BONES: Degenerative changes and osseous demineralization.    IMPRESSION:    No active extravasation of contrast into the bowel is identified.   Correlate with hemodynamics and hematocrit to guide further management.   Extensive colonic diverticulosis. Mild long segment right-sided   pericolonic inflammatory changes could reflect colitis or mild   diverticulitis.    Soft tissue surrounding the celiac artery, of unclear significance,   measuring 12 x 7 mm on image 23 series 11. Correlate with any prior   available imaging to evaluate for stability over time, versus abdominal   MRI for further characterization. Few small hepatic hypodensities, too   small to characterize.    Common bile duct prominent size (11 mm) may be due to postcholecystectomy   status. Correlate with LFTs.      < end of copied text >            
Pt Name: ДМИТРИЙ EISENBERG    MRN: [id number]      Patient is a 68y Female  admitted 10/24 for GI bleed, since resolved. Called for recommendations for DVT prophylaxis as patient is status post left total knee arthroplasty on 10/6/2023 with Dr. Jackson.  Patient is doing well and is comfortable. The patient's pain is controlled using the prescribed pain medications. The patient is participating in physical therapy. Patient reports compliance with prescribed DVT prophylaxis of aspirin 81mg twice a day, last dose taken 10/23/2023. Denies nausea, vomiting, chest pain, shortness of breath, abdominal pain or fever. No new complaints.      PAST MEDICAL & SURGICAL HISTORY:  PAST MEDICAL & SURGICAL HISTORY:  HTN (hypertension)      GERD (gastroesophageal reflux disease)      Unilateral primary osteoarthritis, left knee      Thyroid nodule      Prediabetes      H/O oral surgery      H/O tubal ligation      History of cholecystectomy      S/P excision of lipoma          Allergies: sulfa drugs (Hives)      Medications: aspirin  chewable 81 milliGRAM(s) Oral two times a day  chlorhexidine 2% Cloths 1 Application(s) Topical daily  metoprolol tartrate 12.5 milliGRAM(s) Oral every 12 hours  ondansetron Injectable 4 milliGRAM(s) IV Push every 6 hours PRN  oxycodone    5 mG/acetaminophen 325 mG 1 Tablet(s) Oral every 6 hours PRN  pantoprazole  Injectable 40 milliGRAM(s) IV Push daily  piperacillin/tazobactam IVPB.. 3.375 Gram(s) IV Intermittent every 8 hours      FAMILY HISTORY:  FH: Parkinson's disease (Sibling)    FH: heart attack (Mother)    FH: diabetes mellitus (Sibling)    FH: heart disease (Sibling)    FH: lung cancer (Sibling)    : non-contributory  Social History:                             8.4    11.21 )-----------( 265      ( 29 Oct 2023 05:08 )             25.9       10-29    141  |  106  |  7.5<L>  ----------------------------<  113<H>  3.8   |  26.0  |  0.58    Ca    8.2<L>      29 Oct 2023 05:08  Phos  3.3     10-28  Mg     2.2     10-29    TPro  5.2<L>  /  Alb  3.2<L>  /  TBili  0.5  /  DBili  x   /  AST  17  /  ALT  11  /  AlkPhos  62  10-28      Vital Signs Last 24 Hrs  T(C): 37.2 (29 Oct 2023 09:16), Max: 37.2 (29 Oct 2023 09:16)  T(F): 99 (29 Oct 2023 09:16), Max: 99 (29 Oct 2023 09:16)  HR: 62 (29 Oct 2023 09:16) (62 - 87)  BP: 122/74 (29 Oct 2023 09:16) (122/74 - 151/79)  BP(mean): --  RR: 18 (29 Oct 2023 09:16) (18 - 18)  SpO2: 98% (29 Oct 2023 09:16) (95% - 98%)    Parameters below as of 29 Oct 2023 09:16  Patient On (Oxygen Delivery Method): room air        Physical exam: The left knee incision intact, no wound dehiscence. No drainage or discharge. No erythema is noted. No blistering. No ecchymosis. The calf is supple nontender. No calf tenderness. Sensation to light touch is grossly intact distally. Motor function distally is 5/5. Extensor hallucis longus and flexor hallucis longus are intact. No foot drop. 2+ dorsalis pedis pulse. Capillary refill is less than 2 seconds. No cyanosis.    Primary Orthopedic Assessment:  • s/p LEFT total knee replacement 10/6/2023        Plan:   * D/w Dr. Jackson  • DVT prophylaxis Aspirin 81mg BID until 11/17/2023 unless contraindicated, including use of compression devices and ankle pumps  * d/w with Dr. Plummer regarding DVTp, and will consider alternative if necessary  • Continue physical therapy  • Weightbearing as tolerated of the left lower extremity with assistance of a walker  • Incentive spirometry encouraged  • Pain control as clinically indicated  • Discharge planning as per primary team

## 2023-10-29 NOTE — PROGRESS NOTE ADULT - ASSESSMENT
67 y/o female with PMH of HTN, GERD, Live's esophagus, recent left TKR who was admitted to medicine service (10/24/23) for rectal bleed. Initial CTA abdomen on arrival showed long segment R sided pericolonic inflammatory changes which could reflect colitis/diverticulitis. Patient noted to have acute blood loss anemia; CTA abdomen repeated: revealed active bleeding in the left transverse colon from a diverticulum. Transferred to MICU. IR and GI on board. Patient s/p angiogram with IR, no active bleed seen. Colonoscopy done (10/27/23): no active bleed, severe diverticulosis of the whole colon and stage 1 internal hemorrhoids. S/p 4 units of PRBC. Hb: 8.5.     #Acute GI bleed   Hb 8.8  S/p angiogram/colonoscopy: both showed no active bleed   GI on board   Monitor H/H; transfuse to keep Hb> 7   Diet advanced  Meloxicam stopped  ASA resumed given recent knee surg, trend CBC    #Leukocytosis  WBC: 13.71  No clear source of infection   Trend    Started on Zosyn given concern for possible colitis, will tx for 5 days    #Recent knee surgery  Avoid NSAIDs, resume ASA BID, monitor CBC  PT eval  PRN Oxy  SCD    #HTN   Metoprolol tartrate 12.5mg bid     #GERD   PPI 40mg bid    DVT ppx: ASA BID given recent knee surgery  Dispo: 1-2 days pending monitoring of CBC while on ASA    PT eval 69 y/o female with PMH of HTN, GERD, Live's esophagus, recent left TKR who was admitted to medicine service (10/24/23) for rectal bleed. Initial CTA abdomen on arrival showed long segment R sided pericolonic inflammatory changes which could reflect colitis/diverticulitis. Patient noted to have acute blood loss anemia; CTA abdomen repeated: revealed active bleeding in the left transverse colon from a diverticulum. Transferred to MICU. IR and GI on board. Patient s/p angiogram with IR, no active bleed seen. Colonoscopy done (10/27/23): no active bleed, severe diverticulosis of the whole colon and stage 1 internal hemorrhoids. S/p 4 units of PRBC. Hb: 8.5.     #Acute GI bleed   Hb 8.8  S/p angiogram/colonoscopy: both showed no active bleed   GI on board   Monitor H/H; transfuse to keep Hb> 7, s/p 4 units   Diet advanced  Meloxicam stopped  Was on ASA BID for DVT ppx, ortho called to see if ASA can be stopped earlier    #Leukocytosis  WBC: 13.71  No clear source of infection   Trend    Started on Zosyn given concern for possible colitis, will tx for 5 days    #Recent knee surgery  Avoid NSAIDs, resume ASA BID, monitor CBC  PT eval  PRN Oxy  SCD    #HTN   Metoprolol tartrate 12.5mg bid     #GERD   PPI 40mg bid    DVT ppx: SCD  Dispo: 1-2 days pending stabilization of Hg    PT eval 67 y/o female with PMH of HTN, GERD, Live's esophagus, recent left TKR who was admitted to medicine service (10/24/23) for rectal bleed. Initial CTA abdomen on arrival showed long segment R sided pericolonic inflammatory changes which could reflect colitis/diverticulitis. Patient noted to have acute blood loss anemia; CTA abdomen repeated: revealed active bleeding in the left transverse colon from a diverticulum. Transferred to MICU. IR and GI on board. Patient s/p angiogram with IR, no active bleed seen. Colonoscopy done (10/27/23): no active bleed, severe diverticulosis of the whole colon and stage 1 internal hemorrhoids. S/p 4 units of PRBC. Hb: 8.5.     #Acute GI bleed   Hb 8.8  S/p angiogram/colonoscopy: both showed no active bleed   GI on board   Monitor H/H; transfuse to keep Hb> 7, s/p 4 units   Diet advanced  Meloxicam stopped  ASA resumed, trend CBC    #Leukocytosis  WBC: 13.71  No clear source of infection   Trend    Started on Zosyn given concern for possible colitis, will tx for 5 days    #Recent knee surgery  Avoid NSAIDs, resume ASA BID, monitor CBC  PT eval  PRN Oxy  SCD    #HTN   Metoprolol tartrate 12.5mg bid     #GERD   PPI 40mg bid    DVT ppx: ASA BID  Dispo: 1-2 days pending stabilization of Hg    PT eval

## 2023-10-29 NOTE — DISCHARGE NOTE PROVIDER - PROVIDER TOKENS
PROVIDER:[TOKEN:[[id number]]] PROVIDER:[TOKEN:[[id number]]],PROVIDER:[TOKEN:[[id number]]]

## 2023-10-30 LAB
ANION GAP SERPL CALC-SCNC: 11 MMOL/L — SIGNIFICANT CHANGE UP (ref 5–17)
ANION GAP SERPL CALC-SCNC: 11 MMOL/L — SIGNIFICANT CHANGE UP (ref 5–17)
BUN SERPL-MCNC: 12.8 MG/DL — SIGNIFICANT CHANGE UP (ref 8–20)
BUN SERPL-MCNC: 12.8 MG/DL — SIGNIFICANT CHANGE UP (ref 8–20)
CALCIUM SERPL-MCNC: 8.5 MG/DL — SIGNIFICANT CHANGE UP (ref 8.4–10.5)
CALCIUM SERPL-MCNC: 8.5 MG/DL — SIGNIFICANT CHANGE UP (ref 8.4–10.5)
CHLORIDE SERPL-SCNC: 104 MMOL/L — SIGNIFICANT CHANGE UP (ref 96–108)
CHLORIDE SERPL-SCNC: 104 MMOL/L — SIGNIFICANT CHANGE UP (ref 96–108)
CO2 SERPL-SCNC: 24 MMOL/L — SIGNIFICANT CHANGE UP (ref 22–29)
CO2 SERPL-SCNC: 24 MMOL/L — SIGNIFICANT CHANGE UP (ref 22–29)
CREAT SERPL-MCNC: 0.5 MG/DL — SIGNIFICANT CHANGE UP (ref 0.5–1.3)
CREAT SERPL-MCNC: 0.5 MG/DL — SIGNIFICANT CHANGE UP (ref 0.5–1.3)
EGFR: 103 ML/MIN/1.73M2 — SIGNIFICANT CHANGE UP
EGFR: 103 ML/MIN/1.73M2 — SIGNIFICANT CHANGE UP
GLUCOSE BLDC GLUCOMTR-MCNC: 118 MG/DL — HIGH (ref 70–99)
GLUCOSE BLDC GLUCOMTR-MCNC: 118 MG/DL — HIGH (ref 70–99)
GLUCOSE BLDC GLUCOMTR-MCNC: 147 MG/DL — HIGH (ref 70–99)
GLUCOSE BLDC GLUCOMTR-MCNC: 147 MG/DL — HIGH (ref 70–99)
GLUCOSE BLDC GLUCOMTR-MCNC: 148 MG/DL — HIGH (ref 70–99)
GLUCOSE BLDC GLUCOMTR-MCNC: 148 MG/DL — HIGH (ref 70–99)
GLUCOSE SERPL-MCNC: 133 MG/DL — HIGH (ref 70–99)
GLUCOSE SERPL-MCNC: 133 MG/DL — HIGH (ref 70–99)
HCT VFR BLD CALC: 25.9 % — LOW (ref 34.5–45)
HCT VFR BLD CALC: 25.9 % — LOW (ref 34.5–45)
HGB BLD-MCNC: 8.2 G/DL — LOW (ref 11.5–15.5)
HGB BLD-MCNC: 8.2 G/DL — LOW (ref 11.5–15.5)
MCHC RBC-ENTMCNC: 28.6 PG — SIGNIFICANT CHANGE UP (ref 27–34)
MCHC RBC-ENTMCNC: 28.6 PG — SIGNIFICANT CHANGE UP (ref 27–34)
MCHC RBC-ENTMCNC: 31.7 GM/DL — LOW (ref 32–36)
MCHC RBC-ENTMCNC: 31.7 GM/DL — LOW (ref 32–36)
MCV RBC AUTO: 90.2 FL — SIGNIFICANT CHANGE UP (ref 80–100)
MCV RBC AUTO: 90.2 FL — SIGNIFICANT CHANGE UP (ref 80–100)
PLATELET # BLD AUTO: 313 K/UL — SIGNIFICANT CHANGE UP (ref 150–400)
PLATELET # BLD AUTO: 313 K/UL — SIGNIFICANT CHANGE UP (ref 150–400)
POTASSIUM SERPL-MCNC: 3.7 MMOL/L — SIGNIFICANT CHANGE UP (ref 3.5–5.3)
POTASSIUM SERPL-MCNC: 3.7 MMOL/L — SIGNIFICANT CHANGE UP (ref 3.5–5.3)
POTASSIUM SERPL-SCNC: 3.7 MMOL/L — SIGNIFICANT CHANGE UP (ref 3.5–5.3)
POTASSIUM SERPL-SCNC: 3.7 MMOL/L — SIGNIFICANT CHANGE UP (ref 3.5–5.3)
RBC # BLD: 2.87 M/UL — LOW (ref 3.8–5.2)
RBC # BLD: 2.87 M/UL — LOW (ref 3.8–5.2)
RBC # FLD: 16.4 % — HIGH (ref 10.3–14.5)
RBC # FLD: 16.4 % — HIGH (ref 10.3–14.5)
SODIUM SERPL-SCNC: 139 MMOL/L — SIGNIFICANT CHANGE UP (ref 135–145)
SODIUM SERPL-SCNC: 139 MMOL/L — SIGNIFICANT CHANGE UP (ref 135–145)
WBC # BLD: 12.57 K/UL — HIGH (ref 3.8–10.5)
WBC # BLD: 12.57 K/UL — HIGH (ref 3.8–10.5)
WBC # FLD AUTO: 12.57 K/UL — HIGH (ref 3.8–10.5)
WBC # FLD AUTO: 12.57 K/UL — HIGH (ref 3.8–10.5)

## 2023-10-30 PROCEDURE — 99232 SBSQ HOSP IP/OBS MODERATE 35: CPT

## 2023-10-30 RX ORDER — ACETAMINOPHEN 500 MG
650 TABLET ORAL EVERY 6 HOURS
Refills: 0 | Status: DISCONTINUED | OUTPATIENT
Start: 2023-10-30 | End: 2023-10-31

## 2023-10-30 RX ORDER — ASPIRIN/CALCIUM CARB/MAGNESIUM 324 MG
81 TABLET ORAL
Refills: 0 | Status: DISCONTINUED | OUTPATIENT
Start: 2023-10-30 | End: 2023-10-31

## 2023-10-30 RX ADMIN — Medication 650 MILLIGRAM(S): at 23:45

## 2023-10-30 RX ADMIN — Medication 650 MILLIGRAM(S): at 22:45

## 2023-10-30 RX ADMIN — Medication 650 MILLIGRAM(S): at 14:37

## 2023-10-30 RX ADMIN — Medication 81 MILLIGRAM(S): at 05:11

## 2023-10-30 RX ADMIN — Medication 12.5 MILLIGRAM(S): at 05:11

## 2023-10-30 RX ADMIN — Medication 12.5 MILLIGRAM(S): at 17:51

## 2023-10-30 RX ADMIN — Medication 81 MILLIGRAM(S): at 17:51

## 2023-10-30 RX ADMIN — PANTOPRAZOLE SODIUM 40 MILLIGRAM(S): 20 TABLET, DELAYED RELEASE ORAL at 14:37

## 2023-10-30 RX ADMIN — CHLORHEXIDINE GLUCONATE 1 APPLICATION(S): 213 SOLUTION TOPICAL at 14:37

## 2023-10-30 NOTE — PROGRESS NOTE ADULT - SUBJECTIVE AND OBJECTIVE BOX
Nick Plummer M.D.    Patient is a 68y old  Female who presents with a chief complaint of gib (29 Oct 2023 15:13)      SUBJECTIVE / OVERNIGHT EVENTS: No concerns.     Patient denies chest pain, SOB, abd pain, N/V, fever, chills, dysuria or any other complaints. All remainder ROS negative.     MEDICATIONS  (STANDING):  aspirin  chewable 81 milliGRAM(s) Oral two times a day  chlorhexidine 2% Cloths 1 Application(s) Topical daily  metoprolol tartrate 12.5 milliGRAM(s) Oral every 12 hours  pantoprazole  Injectable 40 milliGRAM(s) IV Push daily    MEDICATIONS  (PRN):  ondansetron Injectable 4 milliGRAM(s) IV Push every 6 hours PRN Nausea and/or Vomiting  oxycodone    5 mG/acetaminophen 325 mG 1 Tablet(s) Oral every 6 hours PRN Moderate Pain (4 - 6) and Severe Pain (7-10)s      I&O's Summary      PHYSICAL EXAM:  Vital Signs Last 24 Hrs  T(C): 36.6 (30 Oct 2023 04:37), Max: 36.7 (29 Oct 2023 12:34)  T(F): 97.9 (30 Oct 2023 04:37), Max: 98.1 (29 Oct 2023 17:18)  HR: 89 (30 Oct 2023 04:37) (62 - 98)  BP: 158/75 (30 Oct 2023 04:37) (136/69 - 158/75)  BP(mean): --  RR: 18 (30 Oct 2023 04:37) (18 - 18)  SpO2: 98% (30 Oct 2023 04:37) (96% - 98%)    Parameters below as of 30 Oct 2023 04:37  Patient On (Oxygen Delivery Method): room air      GENERAL: NAD, lying in bed comfortably  HEAD:  Atraumatic, Normocephalic  PULM: No rales, rhonchi, wheezing, or rubs. Unlabored respirations  HEART: Regular rate and rhythm; No LE edema  ABDOMEN: BSx4; Soft, nontender, nondistended  EXTREMITIES: Left knee incision site noted, c/d/i    LABS:                        8.2    12.57 )-----------( 313      ( 30 Oct 2023 05:18 )             25.9     10-30    139  |  104  |  12.8  ----------------------------<  133<H>  3.7   |  24.0  |  0.50    Ca    8.5      30 Oct 2023 05:18  Mg     2.2     10-29            Urinalysis Basic - ( 30 Oct 2023 05:18 )    Color: x / Appearance: x / SG: x / pH: x  Gluc: 133 mg/dL / Ketone: x  / Bili: x / Urobili: x   Blood: x / Protein: x / Nitrite: x   Leuk Esterase: x / RBC: x / WBC x   Sq Epi: x / Non Sq Epi: x / Bacteria: x        CAPILLARY BLOOD GLUCOSE      POCT Blood Glucose.: 148 mg/dL (30 Oct 2023 05:15)  POCT Blood Glucose.: 122 mg/dL (29 Oct 2023 22:15)      RADIOLOGY & ADDITIONAL TESTS:  Results Reviewed:   Imaging Personally Reviewed:  Electrocardiogram Personally Reviewed:

## 2023-10-30 NOTE — PROGRESS NOTE ADULT - PROVIDER SPECIALTY LIST ADULT
Gastroenterology
Hospitalist
Intervent Radiology
Colorectal Surgery
Critical Care
Critical Care
Gastroenterology
Colorectal Surgery
Hospitalist
Surgery
Hospitalist
Hospitalist

## 2023-10-30 NOTE — PROGRESS NOTE ADULT - ASSESSMENT
67 y/o female with PMH of HTN, GERD, Live's esophagus, recent left TKR who was admitted to medicine service (10/24/23) for rectal bleed. Initial CTA abdomen on arrival showed long segment R sided pericolonic inflammatory changes which could reflect colitis/diverticulitis. Patient noted to have acute blood loss anemia; CTA abdomen repeated: revealed active bleeding in the left transverse colon from a diverticulum. Transferred to MICU. IR and GI on board. Patient s/p angiogram with IR, no active bleed seen. Colonoscopy done (10/27/23): no active bleed, severe diverticulosis of the whole colon and stage 1 internal hemorrhoids. S/p 4 units of PRBC. Hb: 8.5.     #Acute GI bleed   Hb 8.8  S/p angiogram/colonoscopy: both showed no active bleed   GI on board   Monitor H/H; transfuse to keep Hb> 7, s/p 4 units   Diet advanced  Meloxicam stopped  ASA resumed, trend CBC    #Leukocytosis  WBC: 13.71  No clear source of infection   Trend    Started on Zosyn given concern for possible colitis, will tx for 5 days    #Recent knee surgery  Avoid NSAIDs, resume ASA BID, monitor CBC  PT eval  PRN Oxy  SCD    #HTN   Metoprolol tartrate 12.5mg bid     #GERD   PPI 40mg bid    DVT ppx: ASA BID  Dispo: pt wants one more day to monitor Hg, dc in AM    PT eval - no need

## 2023-10-31 VITALS
TEMPERATURE: 98 F | SYSTOLIC BLOOD PRESSURE: 153 MMHG | RESPIRATION RATE: 18 BRPM | HEART RATE: 98 BPM | DIASTOLIC BLOOD PRESSURE: 72 MMHG | OXYGEN SATURATION: 98 %

## 2023-10-31 LAB
GLUCOSE BLDC GLUCOMTR-MCNC: 109 MG/DL — HIGH (ref 70–99)
GLUCOSE BLDC GLUCOMTR-MCNC: 109 MG/DL — HIGH (ref 70–99)
HCT VFR BLD CALC: 26.1 % — LOW (ref 34.5–45)
HCT VFR BLD CALC: 26.1 % — LOW (ref 34.5–45)
HGB BLD-MCNC: 8.3 G/DL — LOW (ref 11.5–15.5)
HGB BLD-MCNC: 8.3 G/DL — LOW (ref 11.5–15.5)

## 2023-10-31 PROCEDURE — 99239 HOSP IP/OBS DSCHRG MGMT >30: CPT

## 2023-10-31 RX ADMIN — Medication 12.5 MILLIGRAM(S): at 05:43

## 2023-10-31 RX ADMIN — Medication 81 MILLIGRAM(S): at 05:43

## 2023-11-13 PROCEDURE — 84295 ASSAY OF SERUM SODIUM: CPT

## 2023-11-13 PROCEDURE — 85018 HEMOGLOBIN: CPT

## 2023-11-13 PROCEDURE — 82435 ASSAY OF BLOOD CHLORIDE: CPT

## 2023-11-13 PROCEDURE — 80048 BASIC METABOLIC PNL TOTAL CA: CPT

## 2023-11-13 PROCEDURE — 75726 ARTERY X-RAYS ABDOMEN: CPT

## 2023-11-13 PROCEDURE — 87640 STAPH A DNA AMP PROBE: CPT

## 2023-11-13 PROCEDURE — C1769: CPT

## 2023-11-13 PROCEDURE — P9037: CPT

## 2023-11-13 PROCEDURE — 71045 X-RAY EXAM CHEST 1 VIEW: CPT

## 2023-11-13 PROCEDURE — C1894: CPT

## 2023-11-13 PROCEDURE — 86850 RBC ANTIBODY SCREEN: CPT

## 2023-11-13 PROCEDURE — 82272 OCCULT BLD FECES 1-3 TESTS: CPT

## 2023-11-13 PROCEDURE — 84100 ASSAY OF PHOSPHORUS: CPT

## 2023-11-13 PROCEDURE — 82803 BLOOD GASES ANY COMBINATION: CPT

## 2023-11-13 PROCEDURE — C9399: CPT

## 2023-11-13 PROCEDURE — 85025 COMPLETE CBC W/AUTO DIFF WBC: CPT

## 2023-11-13 PROCEDURE — 87641 MR-STAPH DNA AMP PROBE: CPT

## 2023-11-13 PROCEDURE — C1760: CPT

## 2023-11-13 PROCEDURE — 85014 HEMATOCRIT: CPT

## 2023-11-13 PROCEDURE — 82962 GLUCOSE BLOOD TEST: CPT

## 2023-11-13 PROCEDURE — 86901 BLOOD TYPING SEROLOGIC RH(D): CPT

## 2023-11-13 PROCEDURE — 75894 X-RAYS TRANSCATH THERAPY: CPT

## 2023-11-13 PROCEDURE — 36600 WITHDRAWAL OF ARTERIAL BLOOD: CPT

## 2023-11-13 PROCEDURE — 36415 COLL VENOUS BLD VENIPUNCTURE: CPT

## 2023-11-13 PROCEDURE — 75774 ARTERY X-RAY EACH VESSEL: CPT

## 2023-11-13 PROCEDURE — P9016: CPT

## 2023-11-13 PROCEDURE — 85027 COMPLETE CBC AUTOMATED: CPT

## 2023-11-13 PROCEDURE — 85730 THROMBOPLASTIN TIME PARTIAL: CPT

## 2023-11-13 PROCEDURE — G1004: CPT

## 2023-11-13 PROCEDURE — 82330 ASSAY OF CALCIUM: CPT

## 2023-11-13 PROCEDURE — 74178 CT ABD&PLV WO CNTR FLWD CNTR: CPT

## 2023-11-13 PROCEDURE — 97163 PT EVAL HIGH COMPLEX 45 MIN: CPT

## 2023-11-13 PROCEDURE — P9100: CPT

## 2023-11-13 PROCEDURE — 86900 BLOOD TYPING SEROLOGIC ABO: CPT

## 2023-11-13 PROCEDURE — 36430 TRANSFUSION BLD/BLD COMPNT: CPT

## 2023-11-13 PROCEDURE — 96360 HYDRATION IV INFUSION INIT: CPT

## 2023-11-13 PROCEDURE — 86923 COMPATIBILITY TEST ELECTRIC: CPT

## 2023-11-13 PROCEDURE — 94002 VENT MGMT INPAT INIT DAY: CPT

## 2023-11-13 PROCEDURE — 85610 PROTHROMBIN TIME: CPT

## 2023-11-13 PROCEDURE — 83605 ASSAY OF LACTIC ACID: CPT

## 2023-11-13 PROCEDURE — P9059: CPT

## 2023-11-13 PROCEDURE — 93970 EXTREMITY STUDY: CPT

## 2023-11-13 PROCEDURE — 80053 COMPREHEN METABOLIC PANEL: CPT

## 2023-11-13 PROCEDURE — 86803 HEPATITIS C AB TEST: CPT

## 2023-11-13 PROCEDURE — 82947 ASSAY GLUCOSE BLOOD QUANT: CPT

## 2023-11-13 PROCEDURE — 83735 ASSAY OF MAGNESIUM: CPT

## 2023-11-13 PROCEDURE — 99285 EMERGENCY DEPT VISIT HI MDM: CPT | Mod: 25

## 2023-11-13 PROCEDURE — 76942 ECHO GUIDE FOR BIOPSY: CPT

## 2023-11-13 PROCEDURE — 84132 ASSAY OF SERUM POTASSIUM: CPT

## 2023-11-16 ENCOUNTER — APPOINTMENT (OUTPATIENT)
Dept: ORTHOPEDIC SURGERY | Facility: CLINIC | Age: 68
End: 2023-11-16
Payer: MEDICARE

## 2023-11-16 PROCEDURE — 99024 POSTOP FOLLOW-UP VISIT: CPT

## 2023-11-16 PROCEDURE — 73562 X-RAY EXAM OF KNEE 3: CPT | Mod: LT

## 2023-12-10 ENCOUNTER — NON-APPOINTMENT (OUTPATIENT)
Age: 68
End: 2023-12-10

## 2023-12-12 ENCOUNTER — APPOINTMENT (OUTPATIENT)
Dept: ORTHOPEDIC SURGERY | Facility: CLINIC | Age: 68
End: 2023-12-12
Payer: MEDICARE

## 2023-12-12 VITALS
WEIGHT: 160 LBS | DIASTOLIC BLOOD PRESSURE: 64 MMHG | BODY MASS INDEX: 25.71 KG/M2 | HEART RATE: 73 BPM | HEIGHT: 66 IN | SYSTOLIC BLOOD PRESSURE: 152 MMHG

## 2023-12-12 DIAGNOSIS — M54.16 RADICULOPATHY, LUMBAR REGION: ICD-10-CM

## 2023-12-12 DIAGNOSIS — M47.816 SPONDYLOSIS W/OUT MYELOPATHY OR RADICULOPATHY, LUMBAR REGION: ICD-10-CM

## 2023-12-12 PROCEDURE — 72110 X-RAY EXAM L-2 SPINE 4/>VWS: CPT

## 2023-12-12 PROCEDURE — 99214 OFFICE O/P EST MOD 30 MIN: CPT | Mod: 24

## 2023-12-12 RX ORDER — GABAPENTIN 300 MG/1
300 CAPSULE ORAL 3 TIMES DAILY
Qty: 90 | Refills: 1 | Status: ACTIVE | COMMUNITY
Start: 2023-12-12 | End: 1900-01-01

## 2024-01-03 ENCOUNTER — APPOINTMENT (OUTPATIENT)
Dept: ORTHOPEDIC SURGERY | Facility: CLINIC | Age: 69
End: 2024-01-03
Payer: MEDICARE

## 2024-01-03 PROCEDURE — 99024 POSTOP FOLLOW-UP VISIT: CPT

## 2024-01-03 PROCEDURE — 73562 X-RAY EXAM OF KNEE 3: CPT | Mod: LT

## 2024-01-03 NOTE — HISTORY OF PRESENT ILLNESS
[Healed] : healed [Erythema] : not erythematous [Discharge] : absent of discharge [Swelling] : swollen [Dehiscence] : not dehisced [Doing Well] : is doing well [No Sign of Infection] : is showing no signs of infection [Adequate Pain Control] : has adequate pain control [de-identified] :  left total knee arthroplasty with finesse. (DOS: 10/06/2023). [de-identified] : 68-year-old female presents to the office about 12 weeks status post left total knee arthroplasty with Waldo.  The patient has been doing very well and is happy with her progress and decision to have surgery.  She has been ambulating without the use of assistive device.  During the day she does not require any medication although at night she occasionally takes tramadol to help her sleep as she is experiences a dull ache over her knee.  She has been participating in outpatient physical therapy.  Denies any significant changes to her medical history since her last visit, incisional issues, recent injuries falls or trauma, numbness or tingling, calf tenderness. [de-identified] : Left Lower Extremity: Incision is well-healed without erythema drainage or discharge, knee range of motion 0-120 degree, no instability to varus or valgus stress, neg anterior drawer, 5 out of 5 strength for plantarflexion dorsiflexion, sensation intact to light touch over the foot, foot warm well perfused brisk capillary refill. [de-identified] : 3 views of the left knee were obtained in the office today show no acute fracture of dislocations. There is a left total knee arthroplasty in appropriate alignment evidence of fracture dislocation or hardware complication. Patella tracts centrally. [de-identified] : 68-year-old female presents to the office for about 12-week follow-up status post left total knee arthroplasty with Waldo.  The patient underwent surgery on 10/6/2023.  She has been doing very well and I am overall very happy with her progress.  I recommend she continue to take Tylenol as needed for the pain as she is unable to take NSAIDs due to history of GI bleed.  We did discuss decreasing her need for tramadol at night, she states she only takes it occasionally.  She should continue with physical therapy and low impact activity and exercise.  She was advised that she will require antibiotics prior to any future dental work.  She will follow-up in 1 year from her date of surgery or sooner if any issues arise.

## 2024-01-07 ENCOUNTER — NON-APPOINTMENT (OUTPATIENT)
Age: 69
End: 2024-01-07

## 2024-01-10 ENCOUNTER — APPOINTMENT (OUTPATIENT)
Dept: ORTHOPEDIC SURGERY | Facility: CLINIC | Age: 69
End: 2024-01-10

## 2024-01-29 ENCOUNTER — APPOINTMENT (OUTPATIENT)
Dept: RHEUMATOLOGY | Facility: CLINIC | Age: 69
End: 2024-01-29
Payer: MEDICARE

## 2024-01-29 ENCOUNTER — NON-APPOINTMENT (OUTPATIENT)
Age: 69
End: 2024-01-29

## 2024-01-29 ENCOUNTER — LABORATORY RESULT (OUTPATIENT)
Age: 69
End: 2024-01-29

## 2024-01-29 VITALS
SYSTOLIC BLOOD PRESSURE: 146 MMHG | TEMPERATURE: 98 F | WEIGHT: 165 LBS | BODY MASS INDEX: 26.52 KG/M2 | DIASTOLIC BLOOD PRESSURE: 80 MMHG | OXYGEN SATURATION: 99 % | HEART RATE: 77 BPM | HEIGHT: 66 IN | RESPIRATION RATE: 17 BRPM

## 2024-01-29 DIAGNOSIS — M79.605 PAIN IN LEFT LEG: ICD-10-CM

## 2024-01-29 DIAGNOSIS — Z87.19 PERSONAL HISTORY OF OTHER DISEASES OF THE DIGESTIVE SYSTEM: ICD-10-CM

## 2024-01-29 PROCEDURE — G2211 COMPLEX E/M VISIT ADD ON: CPT

## 2024-01-29 PROCEDURE — 99215 OFFICE O/P EST HI 40 MIN: CPT

## 2024-01-29 PROCEDURE — G2212 PROLONG OUTPT/OFFICE VIS: CPT

## 2024-01-29 RX ORDER — OXYCODONE AND ACETAMINOPHEN 5; 325 MG/1; MG/1
5-325 TABLET ORAL
Qty: 28 | Refills: 0 | Status: DISCONTINUED | COMMUNITY
Start: 2023-10-16 | End: 2024-01-29

## 2024-01-29 RX ORDER — HYDROCODONE BITARTRATE AND ACETAMINOPHEN 7.5; 325 MG/1; MG/1
7.5-325 TABLET ORAL 4 TIMES DAILY
Qty: 30 | Refills: 0 | Status: DISCONTINUED | COMMUNITY
Start: 2023-10-17 | End: 2024-01-29

## 2024-01-29 RX ORDER — ETODOLAC 400 MG/1
400 TABLET, FILM COATED, EXTENDED RELEASE ORAL
Qty: 180 | Refills: 0 | Status: DISCONTINUED | COMMUNITY
Start: 2023-04-25 | End: 2024-01-29

## 2024-01-29 RX ORDER — OXYCODONE 5 MG/1
5 TABLET ORAL
Qty: 28 | Refills: 0 | Status: DISCONTINUED | COMMUNITY
Start: 2023-10-16 | End: 2024-01-29

## 2024-01-29 RX ORDER — TRAMADOL HYDROCHLORIDE 50 MG/1
50 TABLET, COATED ORAL
Qty: 21 | Refills: 0 | Status: DISCONTINUED | COMMUNITY
Start: 2023-11-16 | End: 2024-01-29

## 2024-01-29 RX ORDER — TRAMADOL HYDROCHLORIDE 50 MG/1
50 TABLET, COATED ORAL
Qty: 15 | Refills: 0 | Status: DISCONTINUED | COMMUNITY
Start: 2024-01-03 | End: 2024-01-29

## 2024-02-02 PROBLEM — Z87.19 HISTORY OF LOWER GASTROINTESTINAL BLEEDING: Status: RESOLVED | Noted: 2024-02-02 | Resolved: 2024-02-02

## 2024-02-02 RX ORDER — ACETAMINOPHEN 650 MG/1
650 TABLET, EXTENDED RELEASE ORAL
Qty: 18 | Refills: 0 | Status: DISCONTINUED | COMMUNITY
Start: 2023-08-08

## 2024-02-02 RX ORDER — MELOXICAM 15 MG/1
15 TABLET ORAL
Qty: 30 | Refills: 0 | Status: DISCONTINUED | COMMUNITY
Start: 2023-10-19

## 2024-02-02 RX ORDER — AMOXICILLIN 500 MG/1
500 CAPSULE ORAL
Qty: 15 | Refills: 0 | Status: DISCONTINUED | COMMUNITY
Start: 2023-08-08

## 2024-02-02 RX ORDER — DOCUSATE SODIUM 50MG AND SENNOSIDES 8.6MG 8.6; 5 MG/1; MG/1
8.6-5 TABLET, FILM COATED ORAL
Qty: 10 | Refills: 0 | Status: DISCONTINUED | COMMUNITY
Start: 2023-10-07

## 2024-02-02 NOTE — PHYSICAL EXAM
[General Appearance - Alert] : alert [General Appearance - In No Acute Distress] : in no acute distress [General Appearance - Well Nourished] : well nourished [General Appearance - Well Developed] : well developed [General Appearance - Well-Appearing] : healthy appearing [Sclera] : the sclera and conjunctiva were normal [PERRL With Normal Accommodation] : pupils were equal in size, round, and reactive to light [Extraocular Movements] : extraocular movements were intact [Outer Ear] : the ears and nose were normal in appearance [Neck Appearance] : the appearance of the neck was normal [Neck Cervical Mass (___cm)] : no neck mass was observed [Jugular Venous Distention Increased] : there was no jugular-venous distention [Thyroid Diffuse Enlargement] : the thyroid was not enlarged [Lungs Percussion] : the lungs were normal to percussion [Heart Rate And Rhythm] : heart rate was normal and rhythm regular [Edema] : there was no peripheral edema [Abdomen Soft] : soft [Abdomen Tenderness] : non-tender [Abdomen Mass (___ Cm)] : no abdominal mass palpated [Cervical Lymph Nodes Enlarged Posterior Bilaterally] : posterior cervical [Cervical Lymph Nodes Enlarged Anterior Bilaterally] : anterior cervical [Supraclavicular Lymph Nodes Enlarged Bilaterally] : supraclavicular [Axillary Lymph Nodes Enlarged Bilaterally] : axillary [No CVA Tenderness] : no ~M costovertebral angle tenderness [No Spinal Tenderness] : no spinal tenderness [Skin Color & Pigmentation] : normal skin color and pigmentation [Skin Turgor] : normal skin turgor [] : no rash [Cranial Nerves] : cranial nerves 2-12 were intact [Sensation] : the sensory exam was normal to light touch and pinprick [Motor Exam] : the motor exam was normal [No Focal Deficits] : no focal deficits [Oriented To Time, Place, And Person] : oriented to person, place, and time [Impaired Insight] : insight and judgment were intact [Affect] : the affect was normal [Mood] : the mood was normal [FreeTextEntry1] : Strength-5/5

## 2024-02-02 NOTE — ASSESSMENT
[FreeTextEntry1] : Impression:ДМИТРИЙ EISENBERG is a 68 year old woman who presents for follow up office visit for further evaluation of joint symptoms and rheumatic diseases including osteoarthritis, fibromyalgia, Sjogren's syndrome and osteoporosis with wedge compression T12.  Note: Patient last seen April 2023  Patient feels fairly well. When supine, patient has pain left knee with radiation to the low back secondary to a combination of her osteoarthritis, chronic low back pain with LS radiculopathy to the left lower extremity, and fibromyalgia with much sleep disturbance and fatigue without snoring. Exercise 5 times a week by walking on treadmill for 20 minutes. Discontinued Etodolac secondary to surgery and GI bleed. The patient continues calcium and vitamin D supplements q.d. for her osteoporosis with wedge compression T12. Discontinued Vitamin D 50,000 units secondary to insurance issues.  On her own she has been taking OTC vitamin D 2000 units once daily.  Of note, patient declined additional treatment of her osteoporosis with wedge compression T12 including Prolia injections q.6 months.  She understands the need and consequences regarding treatment for her osteoporosis.  Patient denies rash or side effects with current medications. Patient is content with current medication regimen.   Plan: I reviewed the patient's chart and previous records  I reviewed recent lab results with patient with extensive discussion Laboratory tests ordered - see list below - with coordination of care  Obtain recent x-ray results completed via [facility] Obtained previous chest x-ray report from [facility]  MRI LS spine- patient declined at this time Diagnosis and prognosis discussed Continue current medications (other than those changed below) Increase Gabapentin 300 mg from as needed to h.s. on a regular basis (Possible side effects explained) Discontinue OTC vitamin D Vitamin D 50,000 units once a week (Possible side effects explained) Patient declined additional treatment of her osteoporosis--extensive discussion--she understands the need and the consequences including increased mortality with hip fractures Artificial tears one drop each eye q.i.d. and p.r.n.(Possible side effects explained) Biotene mouthwash/spray q.i.d. and p.r.n.(Possible side effects explained)  Oral Hydration Patient declines oral medication for dryness Gradually increase exercise to daily for at least 30 minutes per day - emphasized--extensive discussion Return visit 4 months All questions and concerns were addressed. Total time for this office visit, including face-to-face time and non-face-to-face time, 85 minutes--- including review of the chart and previous records, detailed review of her medical history, review of previous lab results with extensive discussion with the patient, ordering lab tests with coordination of care, review of previous imaging reports/x-ray results, ordering MRI LS spine but patient declined, obtain previous x-ray results to be reviewed as noted above, , detailed medication history, review of medications going forward with their possible side effects, reviewed the impact of the patient's rheumatic disease on their other medical problems, reviewed the impact of the patient's other medical problems on their rheumatic disease

## 2024-02-02 NOTE — CONSULT LETTER
[Dear  ___] : Dear  [unfilled], [Consult Letter:] : I had the pleasure of evaluating your patient, [unfilled]. [Please see my note below.] : Please see my note below. [Consult Closing:] : Thank you very much for allowing me to participate in the care of this patient.  If you have any questions, please do not hesitate to contact me. [Sincerely,] : Sincerely, [FreeTextEntry3] : Zack\par  Myron I. Kleiner, M.D., FACR\par  Chief, Division of Rheumatology\par  Department of Medicine\par  [facility]

## 2024-02-02 NOTE — HISTORY OF PRESENT ILLNESS
[FreeTextEntry1] : ДМИТРИЙ EISENBERG is a 68 year old woman who presents for follow up office visit for further evaluation of joint symptoms and rheumatic diseases including osteoarthritis, fibromyalgia, Sjogren's syndrome and osteoporosis with wedge compression T12.  Note: Patient last seen April 2023  Patient feels fairly well. When supine, patient has pain left knee with radiation to the low back. Much sleep disturbance and fatigue without snoring. Exercise 5 times a week by walking on treadmill for 20 minutes. Discontinued Etodolac secondary to surgery and GI bleed. The patient continues calcium and vitamin D supplements q.d. Discontinued Vitamin D 50,000 units secondary to insurance issues. Patient denies rash or side effects with current medications. Patient is content with current medication regimen.   PSH: October 6, 2023 - left TKA without complication  November 2023 - lower GI bleed - hospitalized [facility] - diagnosed diverticulosis and treated with transfusions

## 2024-02-15 NOTE — ED ADULT TRIAGE NOTE - ADDITIONAL SAFETY/BANDS...
At this point the bleeding will not stop until the Nexplanon is removed.   
Left message for pt's mother to call the office.     
Onset: 5-6 months  Location / description: bleeding spotting  Precipitating Factors:   Pain Scale (1 - 10), 10 highest: 0  Associated Symptoms: Mom called in for Sisi regarding her ongoing spotting/bleeding. Pt had Nexplanon placed 2022. Then placed on levonorgestrel-ethinyl estradiol 0.1-20 MG-MCG per tablet x 1 month (July 2023) for issues with spotting since nexplanon placed,  which helped for 1 month then started spotting/bleeding again and has not stopped since. Has a few days here and there where bleeding is lighter then starts again. No cramping, no heavy bleeding or clots. Denies fatigue, no lightheadedness or feeling of fast heart beat. Bleeding and anemia precautions discussed. Verbalizes understadning.   What improves/worsens symptoms: na  Symptom specific medications: na  LMP :ongoing  Are you pregnant or breast feeding: na  Recent Care: LOV  12/22/22    Routing to Shannen Kan CNP for review and recommendations.   
Pt's mother notified of Shannen's message. Transferred to  to schedule Nexplanon removal.   
Sisi Marcus Patient Age: 16 year old  MESSAGE: Interpreting service used: No    Insurance on file confirmed with caller: Yes    Obstetrics & Gynecology- Reason for call: Symptom- Other symptoms-   Symptom(s): Mom reports that since having the Nexplanon inserted the patient's periods have never stopped.      Connected caller to Call Center Triage Queue and routed message to provider's clinical support pool.    Message read back to caller for accuracy: Yes       ALLERGIES:  Seasonal  Current Outpatient Medications   Medication Sig Dispense Refill    levonorgestrel-ethinyl estradiol 0.1-20 MG-MCG per tablet Take 1 tablet by mouth daily. 28 tablet 0     No current facility-administered medications for this visit.     PHARMACY to use:           Pharmacy preference(s) on file:   [id number] - [place], [place] - [address] AT [facility] [address]  [address] [place] [address]  Phone: [phone] Fax: [phone]      CALL BACK INFO: Ok to leave response (including medical information) on answering machine      PCP: Dameon Yeager MD         INS: Payor: [employer]UE [employer] [place] / Plan: PPO [id number] / Product Type: PPO MISC   PATIENT ADDRESS:  [address] [place] [id number]        
Additional Safety/Bands:

## 2024-02-16 NOTE — PHYSICAL THERAPY INITIAL EVALUATION ADULT - BALANCE DISTURBANCE, IDENTIFIED IMPAIRMENT CONTRIBUTE, REHAB EVAL
Bacterial skin infection (bullous impetigo)  Intertrigo    We will call with culture results - likely next week  Start mupirocin antibiotic ointment to red areas in diaper. Use 2x/day for the next 1-2 days, then stop this to allow skin to dry out   Air dry if able  Pat dry any moisture in skin folds after baths  Cephalexin oral antibiotic  If fevers, streaking redness, looking ill-->please have him seen at [facility] over the weekend    
decreased ROM

## 2024-03-10 LAB
ALBUMIN MFR SERPL ELPH: 60.1 %
ALBUMIN SERPL ELPH-MCNC: 4.6 G/DL
ALBUMIN SERPL-MCNC: 4.5 G/DL
ALBUMIN/GLOB SERPL: 1.5 RATIO
ALP BLD-CCNC: 108 U/L
ALPHA1 GLOB MFR SERPL ELPH: 4.4 %
ALPHA1 GLOB SERPL ELPH-MCNC: 0.3 G/DL
ALPHA2 GLOB MFR SERPL ELPH: 9.7 %
ALPHA2 GLOB SERPL ELPH-MCNC: 0.7 G/DL
ALT SERPL-CCNC: 18 U/L
ANION GAP SERPL CALC-SCNC: 13 MMOL/L
AST SERPL-CCNC: 19 U/L
B-GLOBULIN MFR SERPL ELPH: 11 %
B-GLOBULIN SERPL ELPH-MCNC: 0.8 G/DL
BASOPHILS # BLD AUTO: 0.1 K/UL
BASOPHILS NFR BLD AUTO: 0.9 %
BILIRUB SERPL-MCNC: 0.2 MG/DL
BUN SERPL-MCNC: 15 MG/DL
CALCIUM SERPL-MCNC: 9.7 MG/DL
CHLORIDE SERPL-SCNC: 104 MMOL/L
CK SERPL-CCNC: 55 U/L
CO2 SERPL-SCNC: 24 MMOL/L
CREAT SERPL-MCNC: 0.6 MG/DL
CRP SERPL-MCNC: <3 MG/L
DEPRECATED KAPPA LC FREE/LAMBDA SER: 0.88 RATIO
EGFR: 98 ML/MIN/1.73M2
ENA SS-A AB SER IA-ACNC: <0.2 AL
ENA SS-B AB SER IA-ACNC: <0.2 AL
EOSINOPHIL # BLD AUTO: 0.27 K/UL
EOSINOPHIL NFR BLD AUTO: 2.5 %
ERYTHROCYTE [SEDIMENTATION RATE] IN BLOOD BY WESTERGREN METHOD: 31 MM/HR
FOLATE SERPL-MCNC: >20 NG/ML
GAMMA GLOB FLD ELPH-MCNC: 1.1 G/DL
GAMMA GLOB MFR SERPL ELPH: 14.8 %
GLUCOSE SERPL-MCNC: 89 MG/DL
HCT VFR BLD CALC: 38.3 %
HGB BLD-MCNC: 11.6 G/DL
IGA SER QL IEP: 145 MG/DL
IGG SER QL IEP: 1109 MG/DL
IGM SER QL IEP: 82 MG/DL
IMM GRANULOCYTES NFR BLD AUTO: 0.6 %
INTERPRETATION SERPL IEP-IMP: NORMAL
KAPPA LC CSF-MCNC: 1.62 MG/DL
KAPPA LC SERPL-MCNC: 1.42 MG/DL
LDH SERPL-CCNC: 165 U/L
LYMPHOCYTES # BLD AUTO: 2.99 K/UL
LYMPHOCYTES NFR BLD AUTO: 27.5 %
M PROTEIN SPEC IFE-MCNC: NORMAL
MAGNESIUM SERPL-MCNC: 2.2 MG/DL
MAN DIFF?: NORMAL
MCHC RBC-ENTMCNC: 22.8 PG
MCHC RBC-ENTMCNC: 30.3 GM/DL
MCV RBC AUTO: 75.4 FL
MONOCYTES # BLD AUTO: 1.15 K/UL
MONOCYTES NFR BLD AUTO: 10.6 %
NEUTROPHILS # BLD AUTO: 6.31 K/UL
NEUTROPHILS NFR BLD AUTO: 57.9 %
PHOSPHATE SERPL-MCNC: 3.7 MG/DL
PLATELET # BLD AUTO: 415 K/UL
POTASSIUM SERPL-SCNC: 4 MMOL/L
PROT SERPL-MCNC: 7.5 G/DL
RBC # BLD: 5.08 M/UL
RBC # FLD: 21.2 %
SODIUM SERPL-SCNC: 140 MMOL/L
VIT B12 SERPL-MCNC: 397 PG/ML
WBC # FLD AUTO: 10.89 K/UL

## 2024-03-12 DIAGNOSIS — Z47.1 AFTERCARE FOLLOWING JOINT REPLACEMENT SURGERY: ICD-10-CM

## 2024-03-12 DIAGNOSIS — Z96.652 AFTERCARE FOLLOWING JOINT REPLACEMENT SURGERY: ICD-10-CM

## 2024-04-19 ENCOUNTER — NON-APPOINTMENT (OUTPATIENT)
Age: 69
End: 2024-04-19

## 2024-05-13 NOTE — PHYSICAL THERAPY INITIAL EVALUATION ADULT - LEVEL OF INDEPENDENCE: SUPINE/SIT, REHAB EVAL
Primary Care/Behavioral Health Integration - Program Introduction    Patient referred to behavioral health by her primary care provider, Latrice Hurley DO, for concerns related to anxiety.  Second attempt to contact patient to discuss the integration of behavioral health services into the primary care clinic and the provision of care coordination and/or short-term, brief interventions to improve overall health and functioning.  Left a brief message with contact information and requested a callback at her earliest convenience.      Plan: [facility] will await a return call for 7 days. If no response, case will close.    Amy Hall, [facility]  Behavioral Health Coordinator, [facility]     
modified/independent

## 2024-06-05 ENCOUNTER — APPOINTMENT (OUTPATIENT)
Dept: RHEUMATOLOGY | Facility: CLINIC | Age: 69
End: 2024-06-05
Payer: MEDICARE

## 2024-06-05 VITALS
WEIGHT: 172 LBS | HEIGHT: 66 IN | OXYGEN SATURATION: 97 % | SYSTOLIC BLOOD PRESSURE: 148 MMHG | RESPIRATION RATE: 17 BRPM | HEART RATE: 66 BPM | DIASTOLIC BLOOD PRESSURE: 78 MMHG | TEMPERATURE: 97.8 F | BODY MASS INDEX: 27.64 KG/M2

## 2024-06-05 DIAGNOSIS — G89.29 LUMBAGO WITH SCIATICA, LEFT SIDE: ICD-10-CM

## 2024-06-05 DIAGNOSIS — M54.42 LUMBAGO WITH SCIATICA, LEFT SIDE: ICD-10-CM

## 2024-06-05 DIAGNOSIS — S22.080A WEDGE COMPRESSION FRACTURE OF T11-T12 VERTEBRA, INITIAL ENCOUNTER FOR CLOSED FRACTURE: ICD-10-CM

## 2024-06-05 DIAGNOSIS — Z87.19 PERSONAL HISTORY OF OTHER DISEASES OF THE DIGESTIVE SYSTEM: ICD-10-CM

## 2024-06-05 DIAGNOSIS — M15.9 POLYOSTEOARTHRITIS, UNSPECIFIED: ICD-10-CM

## 2024-06-05 DIAGNOSIS — M35.00 SICCA SYNDROME, UNSPECIFIED: ICD-10-CM

## 2024-06-05 DIAGNOSIS — H04.123 DRY EYE SYNDROME OF BILATERAL LACRIMAL GLANDS: ICD-10-CM

## 2024-06-05 DIAGNOSIS — M79.7 FIBROMYALGIA: ICD-10-CM

## 2024-06-05 DIAGNOSIS — R53.83 OTHER FATIGUE: ICD-10-CM

## 2024-06-05 DIAGNOSIS — M81.0 AGE-RELATED OSTEOPOROSIS W/OUT CURRENT PATHOLOGICAL FRACTURE: ICD-10-CM

## 2024-06-05 PROCEDURE — 99215 OFFICE O/P EST HI 40 MIN: CPT

## 2024-06-05 PROCEDURE — G2212 PROLONG OUTPT/OFFICE VIS: CPT

## 2024-06-05 PROCEDURE — G2211 COMPLEX E/M VISIT ADD ON: CPT

## 2024-06-05 RX ORDER — AMOXICILLIN 500 MG/1
500 CAPSULE ORAL
Qty: 4 | Refills: 1 | Status: DISCONTINUED | COMMUNITY
Start: 2024-03-12 | End: 2024-06-05

## 2024-06-05 RX ORDER — ASPIRIN ENTERIC COATED TABLETS 81 MG 81 MG/1
81 TABLET, DELAYED RELEASE ORAL
Qty: 90 | Refills: 0 | Status: DISCONTINUED | COMMUNITY
Start: 2023-04-25 | End: 2024-06-05

## 2024-06-05 RX ORDER — DULOXETINE HYDROCHLORIDE 20 MG/1
20 CAPSULE, DELAYED RELEASE PELLETS ORAL
Qty: 90 | Refills: 0 | Status: ACTIVE | COMMUNITY
Start: 2024-06-05 | End: 1900-01-01

## 2024-06-06 LAB
ALBUMIN SERPL ELPH-MCNC: 4.6 G/DL
ALP BLD-CCNC: 107 U/L
ALT SERPL-CCNC: 19 U/L
ANION GAP SERPL CALC-SCNC: 12 MMOL/L
APPEARANCE: CLEAR
AST SERPL-CCNC: 21 U/L
BACTERIA: NEGATIVE /HPF
BASOPHILS # BLD AUTO: 0.09 K/UL
BASOPHILS NFR BLD AUTO: 0.8 %
BILIRUB SERPL-MCNC: 0.2 MG/DL
BILIRUBIN URINE: NEGATIVE
BLOOD URINE: NEGATIVE
BUN SERPL-MCNC: 14 MG/DL
CALCIUM SERPL-MCNC: 9.7 MG/DL
CAST: 0 /LPF
CHLORIDE SERPL-SCNC: 103 MMOL/L
CK SERPL-CCNC: 68 U/L
CO2 SERPL-SCNC: 23 MMOL/L
COLOR: YELLOW
CREAT SERPL-MCNC: 0.67 MG/DL
CRP SERPL-MCNC: <3 MG/L
EGFR: 95 ML/MIN/1.73M2
ENA SS-A AB SER IA-ACNC: <0.2 AL
ENA SS-B AB SER IA-ACNC: <0.2 AL
EOSINOPHIL # BLD AUTO: 0.34 K/UL
EOSINOPHIL NFR BLD AUTO: 3.2 %
EPITHELIAL CELLS: 6 /HPF
ERYTHROCYTE [SEDIMENTATION RATE] IN BLOOD BY WESTERGREN METHOD: 34 MM/HR
GLUCOSE QUALITATIVE U: NEGATIVE MG/DL
GLUCOSE SERPL-MCNC: 100 MG/DL
HCT VFR BLD CALC: 39.6 %
HGB BLD-MCNC: 12.4 G/DL
IMM GRANULOCYTES NFR BLD AUTO: 0.7 %
KETONES URINE: NEGATIVE MG/DL
LDH SERPL-CCNC: 165 U/L
LEUKOCYTE ESTERASE URINE: NEGATIVE
LYMPHOCYTES # BLD AUTO: 2.65 K/UL
LYMPHOCYTES NFR BLD AUTO: 24.7 %
MAGNESIUM SERPL-MCNC: 2.3 MG/DL
MAN DIFF?: NORMAL
MCHC RBC-ENTMCNC: 24.8 PG
MCHC RBC-ENTMCNC: 31.3 GM/DL
MCV RBC AUTO: 79.4 FL
MICROSCOPIC-UA: NORMAL
MONOCYTES # BLD AUTO: 0.89 K/UL
MONOCYTES NFR BLD AUTO: 8.3 %
NEUTROPHILS # BLD AUTO: 6.71 K/UL
NEUTROPHILS NFR BLD AUTO: 62.3 %
NITRITE URINE: NEGATIVE
PH URINE: 6
PHOSPHATE SERPL-MCNC: 3.9 MG/DL
PLATELET # BLD AUTO: 370 K/UL
POTASSIUM SERPL-SCNC: 4.6 MMOL/L
PROT SERPL-MCNC: 7.5 G/DL
PROTEIN URINE: NEGATIVE MG/DL
RBC # BLD: 4.99 M/UL
RBC # FLD: 17.8 %
RED BLOOD CELLS URINE: 0 /HPF
SODIUM SERPL-SCNC: 139 MMOL/L
SPECIFIC GRAVITY URINE: 1.01
TSH SERPL-ACNC: 1.05 UIU/ML
UROBILINOGEN URINE: 0.2 MG/DL
WBC # FLD AUTO: 10.75 K/UL
WHITE BLOOD CELLS URINE: 2 /HPF

## 2024-06-07 PROBLEM — Z87.19 HISTORY OF DIVERTICULITIS OF COLON: Status: RESOLVED | Noted: 2024-02-02 | Resolved: 2024-06-07

## 2024-06-07 LAB
ALBUMIN MFR SERPL ELPH: 59.1 %
ALBUMIN SERPL-MCNC: 4.4 G/DL
ALBUMIN/GLOB SERPL: 1.4 RATIO
ALPHA1 GLOB MFR SERPL ELPH: 4.4 %
ALPHA1 GLOB SERPL ELPH-MCNC: 0.3 G/DL
ALPHA2 GLOB MFR SERPL ELPH: 10 %
ALPHA2 GLOB SERPL ELPH-MCNC: 0.8 G/DL
B-GLOBULIN MFR SERPL ELPH: 11.2 %
B-GLOBULIN SERPL ELPH-MCNC: 0.8 G/DL
DEPRECATED KAPPA LC FREE/LAMBDA SER: 0.87 RATIO
GAMMA GLOB FLD ELPH-MCNC: 1.1 G/DL
GAMMA GLOB MFR SERPL ELPH: 15.3 %
IGA SER QL IEP: 167 MG/DL
IGG SER QL IEP: 1170 MG/DL
IGM SER QL IEP: 88 MG/DL
INTERPRETATION SERPL IEP-IMP: NORMAL
KAPPA LC CSF-MCNC: 1.73 MG/DL
KAPPA LC SERPL-MCNC: 1.5 MG/DL
M PROTEIN SPEC IFE-MCNC: NORMAL
PROT SERPL-MCNC: 7.5 G/DL
PROT SERPL-MCNC: 7.5 G/DL

## 2024-06-07 NOTE — HISTORY OF PRESENT ILLNESS
[FreeTextEntry1] : ДМИТРИЙ EISENBERG is a 69 year old woman who presents for follow up office visit for further evaluation of joint symptoms and rheumatic diseases including osteoarthritis, Sjogren's syndrome, fibromyalgia and osteoporosis with wedge compression T12.  Pain left knee especially when supine. Low back pain radiating down the left lower extremities to the toes especially when supine. After starting Gabapentin pt mentions low back pain without radiation. Some recent sleep disturbance and fatigue. Some recent dry eyes and dry mouth. using artificial tears, biotene mouthwash, and oral hydration with relief. Denies recent cardio exercise. On her own pt decreased Gabapentin 300 mg h.s instead og t.i.d Secondary to feeling sleepy. The patient continues calcium 500 mg b.i.d. and vitamin D supplements q.d. Patient denies rash or side effects with current medications. Patient is content with current medication regimen.   PMH One week ago developed cold sores on lip after sun exposure - resolved  PSH: October 6, 2023 - left TKA without complication  November 2023 - lower GI bleed - hospitalized [facility] - diagnosed diverticulosis and treated with transfusions

## 2024-06-07 NOTE — ADDENDUM
[FreeTextEntry1] :  I, Adrian Amaya, acted solely as a scribe for Dr. Myron I. Kleiner, MD. on 06/05/2024. I personally performed the services described in the documentation, reviewed the documentation recorded by the scribe in my presence, and it accurately and completely records my words and actions.

## 2024-06-07 NOTE — CONSULT LETTER
[Dear  ___] : Dear  [unfilled], [Consult Letter:] : I had the pleasure of evaluating your patient, [unfilled]. [Please see my note below.] : Please see my note below. [Consult Closing:] : Thank you very much for allowing me to participate in the care of this patient.  If you have any questions, please do not hesitate to contact me. [Sincerely,] : Sincerely, [FreeTextEntry3] : Zack\par  Myron I. Kleiner, M.D., FACR\par  Chief, Division of Rheumatology\par  Department of Medicine\par  [facility]

## 2024-06-07 NOTE — PHYSICAL EXAM
[General Appearance - Alert] : alert [General Appearance - In No Acute Distress] : in no acute distress [General Appearance - Well Nourished] : well nourished [General Appearance - Well Developed] : well developed [General Appearance - Well-Appearing] : healthy appearing [Sclera] : the sclera and conjunctiva were normal [PERRL With Normal Accommodation] : pupils were equal in size, round, and reactive to light [Extraocular Movements] : extraocular movements were intact [Outer Ear] : the ears and nose were normal in appearance [Neck Appearance] : the appearance of the neck was normal [Neck Cervical Mass (___cm)] : no neck mass was observed [Jugular Venous Distention Increased] : there was no jugular-venous distention [Thyroid Diffuse Enlargement] : the thyroid was not enlarged [Lungs Percussion] : the lungs were normal to percussion [Heart Rate And Rhythm] : heart rate was normal and rhythm regular [Edema] : there was no peripheral edema [Abdomen Soft] : soft [Abdomen Tenderness] : non-tender [Abdomen Mass (___ Cm)] : no abdominal mass palpated [Cervical Lymph Nodes Enlarged Posterior Bilaterally] : posterior cervical [Cervical Lymph Nodes Enlarged Anterior Bilaterally] : anterior cervical [Supraclavicular Lymph Nodes Enlarged Bilaterally] : supraclavicular [Axillary Lymph Nodes Enlarged Bilaterally] : axillary [No CVA Tenderness] : no ~M costovertebral angle tenderness [No Spinal Tenderness] : no spinal tenderness [Skin Color & Pigmentation] : normal skin color and pigmentation [Skin Turgor] : normal skin turgor [] : no rash [Cranial Nerves] : cranial nerves 2-12 were intact [Sensation] : the sensory exam was normal to light touch and pinprick [Motor Exam] : the motor exam was normal [No Focal Deficits] : no focal deficits [Oriented To Time, Place, And Person] : oriented to person, place, and time [Impaired Insight] : insight and judgment were intact [Affect] : the affect was normal [Mood] : the mood was normal [Oropharynx] : the oropharynx was normal [FreeTextEntry1] : Shoulders/Elbows- normal Wrists- crepitus bilateral 1st CMC joints Hands- Heberden's nodes Hips- bilateral decreased external rotation Knees- bilateral crepitus, left flex 90 Ankles/Feet- normal

## 2024-06-07 NOTE — ASSESSMENT
[FreeTextEntry1] : Impression: ДМИТРИЙ EISENBERG is a 69 year old woman who presents for follow up office visit for further evaluation of joint symptoms and rheumatic diseases including osteoarthritis, Sjogren's syndrome, fibromyalgia and osteoporosis with wedge compression T12.  Pain left knee especially when supine Secondary to osteoarthritis and left anserine bursitis. Low back pain radiating down the left lower extremities to the toes especially when supine Secondary to osteoarthritis with LS radiculopathy. After starting Gabapentin pt mentions low back pain increased.   On exam pt has left anserine bursitis contributing to his joint pain. Some recent sleep disturbance and fatigue Secondary to fibromyalgia. Some recent dry eyes and dry mouth - on exam pt has eyes dry, tongue slightly moist Secondary to Sjogren Syndrome. Using artificial tears, biotene mouthwash, and oral hydration with relief. Denies recent cardio exercise. On her own pt decreased Gabapentin 300 mg h.s instead of t.i.d Secondary to feeling sleepy. Of note, patient declined additional treatment of her osteoporosis with wedge compression T12 including Prolia injections q.6 months. She understands the need and consequences regarding treatment for her osteoporosis. The patient continues calcium 500 mg b.i.d. and vitamin D supplements q.d for her osteoporosis with wedge compression T12. Patient denies rash or side effects with current medications. Patient is content with current medication regimen.   Plan: I reviewed the patient's chart and previous records  I reviewed recent lab results with patient with extensive discussion Laboratory tests ordered - see list below - with coordination of care  X-rays ordered  see list below  with coordination of care After Xray's results consider MRI of LS spine (no contraindications) - with coordination of care Diagnosis and prognosis discussed Continue current medications (other than those changed below) Discontinue Gabapentin  Duloxetine 20 mg q.d. at suppertime; if no better/side effects 7 days, increase to twice daily (possible side effects explained) Patient declines any other changes or additions to medication regimen. Artificial tears one drop each eye q.i.d. and p.r.n.(Possible side effects explained) Biotene mouthwash/spray q.i.d. and p.r.n.(Possible side effects explained)  Oral Hydration Patient declines oral medication for dryness Gradually increase exercise to daily for at least 30 minutes per day - emphasized--extensive discussion Consider PT - pt declined  Please send me future chest chest CAT scan report-- with coordination of care -- with extensive discussion Patient continues to decline all treatments for her osteoporosis.  Despite knowing the need and the consequences Return visit 4 months All questions and concerns were addressed. Total time for this office visit, including face-to-face time and non-face-to-face time, 71 minutes--- including review of the chart and previous records, detailed review of her medical history, review of previous lab results with extensive discussion with the patient, ordering lab tests with coordination of care, review of previous imaging reports/x-ray results, ordering of new x-rays with coordination of care, detailed medication history, review of medications going forward with their possible side effects, reviewed the modalities for treatment of her dryness with extensive discussion

## 2024-06-20 ENCOUNTER — EMERGENCY (EMERGENCY)
Facility: HOSPITAL | Age: 69
LOS: 1 days | Discharge: HOME | End: 2024-06-20
Attending: STUDENT IN AN ORGANIZED HEALTH CARE EDUCATION/TRAINING PROGRAM
Payer: MEDICARE

## 2024-06-20 VITALS
TEMPERATURE: 99 F | SYSTOLIC BLOOD PRESSURE: 179 MMHG | HEART RATE: 99 BPM | WEIGHT: 163.58 LBS | OXYGEN SATURATION: 96 % | RESPIRATION RATE: 16 BRPM | DIASTOLIC BLOOD PRESSURE: 82 MMHG

## 2024-06-20 DIAGNOSIS — Z98.890 OTHER SPECIFIED POSTPROCEDURAL STATES: Chronic | ICD-10-CM

## 2024-06-20 DIAGNOSIS — Z90.49 ACQUIRED ABSENCE OF OTHER SPECIFIED PARTS OF DIGESTIVE TRACT: Chronic | ICD-10-CM

## 2024-06-20 DIAGNOSIS — Z98.51 TUBAL LIGATION STATUS: Chronic | ICD-10-CM

## 2024-06-20 LAB
ALBUMIN SERPL ELPH-MCNC: 4.1 G/DL — SIGNIFICANT CHANGE UP (ref 3.3–5.2)
ALP SERPL-CCNC: 107 U/L — SIGNIFICANT CHANGE UP (ref 40–120)
ALT FLD-CCNC: 17 U/L — SIGNIFICANT CHANGE UP
ANION GAP SERPL CALC-SCNC: 15 MMOL/L — SIGNIFICANT CHANGE UP (ref 5–17)
AST SERPL-CCNC: 22 U/L — SIGNIFICANT CHANGE UP
BASOPHILS # BLD AUTO: 0.09 K/UL — SIGNIFICANT CHANGE UP (ref 0–0.2)
BASOPHILS NFR BLD AUTO: 0.9 % — SIGNIFICANT CHANGE UP (ref 0–2)
BILIRUB SERPL-MCNC: <0.2 MG/DL — LOW (ref 0.4–2)
BUN SERPL-MCNC: 15.4 MG/DL — SIGNIFICANT CHANGE UP (ref 8–20)
CALCIUM SERPL-MCNC: 9.9 MG/DL — SIGNIFICANT CHANGE UP (ref 8.4–10.5)
CHLORIDE SERPL-SCNC: 105 MMOL/L — SIGNIFICANT CHANGE UP (ref 96–108)
CO2 SERPL-SCNC: 23 MMOL/L — SIGNIFICANT CHANGE UP (ref 22–29)
CREAT SERPL-MCNC: 0.63 MG/DL — SIGNIFICANT CHANGE UP (ref 0.5–1.3)
EGFR: 97 ML/MIN/1.73M2 — SIGNIFICANT CHANGE UP
EOSINOPHIL # BLD AUTO: 0.24 K/UL — SIGNIFICANT CHANGE UP (ref 0–0.5)
EOSINOPHIL NFR BLD AUTO: 2.3 % — SIGNIFICANT CHANGE UP (ref 0–6)
GLUCOSE SERPL-MCNC: 135 MG/DL — HIGH (ref 70–99)
HCT VFR BLD CALC: 38.5 % — SIGNIFICANT CHANGE UP (ref 34.5–45)
HGB BLD-MCNC: 12.3 G/DL — SIGNIFICANT CHANGE UP (ref 11.5–15.5)
IMM GRANULOCYTES NFR BLD AUTO: 0.6 % — SIGNIFICANT CHANGE UP (ref 0–0.9)
LIDOCAIN IGE QN: 23 U/L — SIGNIFICANT CHANGE UP (ref 22–51)
LYMPHOCYTES # BLD AUTO: 2.22 K/UL — SIGNIFICANT CHANGE UP (ref 1–3.3)
LYMPHOCYTES # BLD AUTO: 21.2 % — SIGNIFICANT CHANGE UP (ref 13–44)
MCHC RBC-ENTMCNC: 25.1 PG — LOW (ref 27–34)
MCHC RBC-ENTMCNC: 31.9 GM/DL — LOW (ref 32–36)
MCV RBC AUTO: 78.4 FL — LOW (ref 80–100)
MONOCYTES # BLD AUTO: 0.89 K/UL — SIGNIFICANT CHANGE UP (ref 0–0.9)
MONOCYTES NFR BLD AUTO: 8.5 % — SIGNIFICANT CHANGE UP (ref 2–14)
NEUTROPHILS # BLD AUTO: 6.99 K/UL — SIGNIFICANT CHANGE UP (ref 1.8–7.4)
NEUTROPHILS NFR BLD AUTO: 66.5 % — SIGNIFICANT CHANGE UP (ref 43–77)
PLATELET # BLD AUTO: 343 K/UL — SIGNIFICANT CHANGE UP (ref 150–400)
POTASSIUM SERPL-MCNC: 4.4 MMOL/L — SIGNIFICANT CHANGE UP (ref 3.5–5.3)
POTASSIUM SERPL-SCNC: 4.4 MMOL/L — SIGNIFICANT CHANGE UP (ref 3.5–5.3)
PROT SERPL-MCNC: 7.3 G/DL — SIGNIFICANT CHANGE UP (ref 6.6–8.7)
RBC # BLD: 4.91 M/UL — SIGNIFICANT CHANGE UP (ref 3.8–5.2)
RBC # FLD: 16.9 % — HIGH (ref 10.3–14.5)
SODIUM SERPL-SCNC: 143 MMOL/L — SIGNIFICANT CHANGE UP (ref 135–145)
TROPONIN T, HIGH SENSITIVITY RESULT: 25 NG/L — SIGNIFICANT CHANGE UP (ref 0–51)
TROPONIN T, HIGH SENSITIVITY RESULT: 37 NG/L — SIGNIFICANT CHANGE UP (ref 0–51)
WBC # BLD: 10.49 K/UL — SIGNIFICANT CHANGE UP (ref 3.8–10.5)
WBC # FLD AUTO: 10.49 K/UL — SIGNIFICANT CHANGE UP (ref 3.8–10.5)

## 2024-06-20 PROCEDURE — 99285 EMERGENCY DEPT VISIT HI MDM: CPT

## 2024-06-20 PROCEDURE — 71046 X-RAY EXAM CHEST 2 VIEWS: CPT | Mod: 26

## 2024-06-20 PROCEDURE — 93010 ELECTROCARDIOGRAM REPORT: CPT

## 2024-06-20 NOTE — ED PROVIDER NOTE - PROGRESS NOTE DETAILS
Jeff: Troponin uptrending from 25 to 37 to 42.  Patient currently asymptomatic.  Given uptrending troponins [facility] cardiology consulted.

## 2024-06-20 NOTE — ED PROVIDER NOTE - CLINICAL SUMMARY MEDICAL DECISION MAKING FREE TEXT BOX
69-year-old female with history of GERD, GI bleed in the past, Live's esophagus with midsternal chest pain for several hours earlier this afternoon mostly resolved at the time of presentation.  Does endorse delaying her daily PPI medications, alcoholic beverages and potato chips that may have exacerbated her reflux symptoms.  Given age and history of hypertension will check troponins.  Initial EKG normal sinus rhythm with T wave inversion in lead aVL that is new from prior in September 2023.  Dispo pending results.

## 2024-06-20 NOTE — ED ADULT NURSE NOTE - CAS ELECT INFOMATION PROVIDED
Patient AA&Ox4, resp even/unlabored, ambulatory, steady gait noted, discharged home with all belongings. Med rec and discharge instructions explained and given to pt by ADALID Humphries. Patient verbalizes understanding on physician follow up, medication regimen and next dose, s/s of complications and monitoring. No distress noted. VSS, recorded in EMR. Peripheral IV removed, intact, bleeding controlled./DC instructions

## 2024-06-20 NOTE — ED PROVIDER NOTE - ATTENDING CONTRIBUTION TO CARE
69y F w/ hx HTN, GERD, Live's esophagus; presents with chest pain. Reports onset of pain earlier today. Pt initially attributed pain to GERD. Took  mg prior to arrival. Currently feels improved. On exam pt in no distress, Heart RRR, lungs CTAB, abdomen soft and nontender. EKG nonischemic; however labs notable for uptrending troponins. Cardiology consulted.

## 2024-06-20 NOTE — ED PROVIDER NOTE - OBJECTIVE STATEMENT
69-year-old female with past medical history of GERD, Live's esophagus, hypertension, rectal bleed presenting to the ER with midsternal chest pain earlier this afternoon.  Patient initially thought it was indigestion as she has very severe GERD.  When symptoms were more prolonged than usual she was worried for a cardiac cause and took 4 baby aspirin and called EMS.  Patient states her pain is much improved.  Endorses delay in taking her daily PPI medications, having a couple of alcoholic beverages for lunch earlier this afternoon prior to the onset of pain, and having potato chips for snack.  Denies shortness of breath, back pain, leg pain, recent travel.  Patient follows with a cardiologist, last evaluation back in April.

## 2024-06-20 NOTE — ED ADULT TRIAGE NOTE - CHIEF COMPLAINT QUOTE
pt c/o bilateral CP, epigastric pain, neck pain, states she initially thought it was indigestion/heart burn.  Pt took 324mg asa incase it was cardiac related.  States feeling slight nausea, was eating a lot of potato chips.  Currently pain 2/10 in epigastric area.  Hx of HTN.  EKG in triage

## 2024-06-21 ENCOUNTER — RESULT REVIEW (OUTPATIENT)
Age: 69
End: 2024-06-21

## 2024-06-21 VITALS
TEMPERATURE: 98 F | RESPIRATION RATE: 18 BRPM | DIASTOLIC BLOOD PRESSURE: 70 MMHG | SYSTOLIC BLOOD PRESSURE: 159 MMHG | OXYGEN SATURATION: 98 % | HEART RATE: 64 BPM

## 2024-06-21 DIAGNOSIS — I10 ESSENTIAL (PRIMARY) HYPERTENSION: ICD-10-CM

## 2024-06-21 DIAGNOSIS — R07.9 CHEST PAIN, UNSPECIFIED: ICD-10-CM

## 2024-06-21 LAB
A1C WITH ESTIMATED AVERAGE GLUCOSE RESULT: 6.5 % — HIGH (ref 4–5.6)
APTT BLD: 30.3 SEC — SIGNIFICANT CHANGE UP (ref 24.5–35.6)
CHOLEST SERPL-MCNC: 188 MG/DL — SIGNIFICANT CHANGE UP
ESTIMATED AVERAGE GLUCOSE: 140 MG/DL — HIGH (ref 68–114)
HDLC SERPL-MCNC: 72 MG/DL — SIGNIFICANT CHANGE UP
INR BLD: 0.94 RATIO — SIGNIFICANT CHANGE UP (ref 0.85–1.18)
LIPID PNL WITH DIRECT LDL SERPL: 107 MG/DL — HIGH
NON HDL CHOLESTEROL: 116 MG/DL — SIGNIFICANT CHANGE UP
PROTHROM AB SERPL-ACNC: 10.5 SEC — SIGNIFICANT CHANGE UP (ref 9.5–13)
TRIGL SERPL-MCNC: 47 MG/DL — SIGNIFICANT CHANGE UP
TROPONIN T, HIGH SENSITIVITY RESULT: 38 NG/L — SIGNIFICANT CHANGE UP (ref 0–51)
TROPONIN T, HIGH SENSITIVITY RESULT: 42 NG/L — SIGNIFICANT CHANGE UP (ref 0–51)

## 2024-06-21 PROCEDURE — 93306 TTE W/DOPPLER COMPLETE: CPT

## 2024-06-21 PROCEDURE — 93005 ELECTROCARDIOGRAM TRACING: CPT

## 2024-06-21 PROCEDURE — 80061 LIPID PANEL: CPT

## 2024-06-21 PROCEDURE — G0378: CPT

## 2024-06-21 PROCEDURE — 83036 HEMOGLOBIN GLYCOSYLATED A1C: CPT

## 2024-06-21 PROCEDURE — 75574 CT ANGIO HRT W/3D IMAGE: CPT | Mod: MC

## 2024-06-21 PROCEDURE — 93010 ELECTROCARDIOGRAM REPORT: CPT

## 2024-06-21 PROCEDURE — 83690 ASSAY OF LIPASE: CPT

## 2024-06-21 PROCEDURE — 99223 1ST HOSP IP/OBS HIGH 75: CPT

## 2024-06-21 PROCEDURE — 36415 COLL VENOUS BLD VENIPUNCTURE: CPT

## 2024-06-21 PROCEDURE — 85730 THROMBOPLASTIN TIME PARTIAL: CPT

## 2024-06-21 PROCEDURE — 99285 EMERGENCY DEPT VISIT HI MDM: CPT

## 2024-06-21 PROCEDURE — 80053 COMPREHEN METABOLIC PANEL: CPT

## 2024-06-21 PROCEDURE — 75574 CT ANGIO HRT W/3D IMAGE: CPT | Mod: 26,MC

## 2024-06-21 PROCEDURE — 85610 PROTHROMBIN TIME: CPT

## 2024-06-21 PROCEDURE — 71046 X-RAY EXAM CHEST 2 VIEWS: CPT

## 2024-06-21 PROCEDURE — 85025 COMPLETE CBC W/AUTO DIFF WBC: CPT

## 2024-06-21 PROCEDURE — 84484 ASSAY OF TROPONIN QUANT: CPT

## 2024-06-21 PROCEDURE — 99285 EMERGENCY DEPT VISIT HI MDM: CPT | Mod: 25

## 2024-06-21 PROCEDURE — 93306 TTE W/DOPPLER COMPLETE: CPT | Mod: 26

## 2024-06-21 RX ORDER — CHOLECALCIFEROL (VITAMIN D3) 125 MCG
1 CAPSULE ORAL
Refills: 0 | DISCHARGE

## 2024-06-21 RX ORDER — NEBIVOLOL HYDROCHLORIDE 5 MG/1
1 TABLET ORAL
Refills: 0 | DISCHARGE

## 2024-06-21 RX ORDER — GABAPENTIN 400 MG/1
1 CAPSULE ORAL
Refills: 0 | DISCHARGE

## 2024-06-21 RX ORDER — METOPROLOL TARTRATE 50 MG
100 TABLET ORAL ONCE
Refills: 0 | Status: COMPLETED | OUTPATIENT
Start: 2024-06-21 | End: 2024-06-21

## 2024-06-21 RX ORDER — PANTOPRAZOLE SODIUM 20 MG/1
40 TABLET, DELAYED RELEASE ORAL
Refills: 0 | Status: ACTIVE | OUTPATIENT
Start: 2024-06-21 | End: 2025-05-20

## 2024-06-21 RX ORDER — GABAPENTIN 400 MG/1
300 CAPSULE ORAL AT BEDTIME
Refills: 0 | Status: ACTIVE | OUTPATIENT
Start: 2024-06-21 | End: 2025-05-20

## 2024-06-21 RX ORDER — ATORVASTATIN CALCIUM 80 MG/1
1 TABLET, FILM COATED ORAL
Qty: 30 | Refills: 0
Start: 2024-06-21 | End: 2024-07-20

## 2024-06-21 RX ORDER — METOPROLOL TARTRATE 50 MG
50 TABLET ORAL ONCE
Refills: 0 | Status: DISCONTINUED | OUTPATIENT
Start: 2024-06-21 | End: 2024-06-21

## 2024-06-21 RX ORDER — ESOMEPRAZOLE MAGNESIUM 40 MG/1
1 CAPSULE, DELAYED RELEASE ORAL
Qty: 0 | Refills: 0 | DISCHARGE

## 2024-06-21 RX ORDER — OMEPRAZOLE 10 MG/1
1 CAPSULE, DELAYED RELEASE ORAL
Refills: 0 | DISCHARGE

## 2024-06-21 RX ADMIN — Medication 100 MILLIGRAM(S): at 08:35

## 2024-06-21 NOTE — CONSULT NOTE ADULT - PROBLEM SELECTOR RECOMMENDATION 2
Assess for target organ damage (CKD, papilledema, encephalopathy) BP goal<130/80mmHg  - lifestyle modifications (DASH diet, daily exercise encouraged, weight loss, limit ETOH intake, avoid NSAIDs)   - VS as per unit protocol  - pharmacologic options: Thiazide (Chlorthalidone) with normal GFR, Bystolic daily    case d/w Dr. Lakhani

## 2024-06-21 NOTE — ED CDU PROVIDER DISPOSITION NOTE - CONDITION AT DISCHARGE:
02/23/18 1154   Group 2   Start Time 1030   Stop Time 1115   Length (min) 45 min   Group Name Therapy Group   Attendance Not present      Improved

## 2024-06-21 NOTE — ED CDU PROVIDER DISPOSITION NOTE - CLINICAL COURSE
68yo F presented to ED with midsternal CP, now resolved. Trop stable x 4. Evaluated by [facility] cardiology, recommended TTE and CTCA. TTE grossly unremarkable, EF 55-60%. CTCA demonstrated mild-to-moderate CAD with 25-49% stenosis at the ostial LCx and 1-24% stenosis at the proximal LAD. Calcium score 37. Dr. Lakhani recommending dc on lipitor 20mg. 68yo F presented to ED with midsternal CP, now resolved. Trop stable x 4. Evaluated by [facility] cardiology, recommended TTE and CTCA. TTE grossly unremarkable, EF 55-60%. CTCA demonstrated mild-to-moderate CAD with 25-49% stenosis at the ostial LCx and 1-24% stenosis at the proximal LAD. Calcium score 37. Dr. Lakhani recommending dc on lipitor 20mg. all incidental ct findings discussed. all results discussed. provided copy of all results. return precautions discussed

## 2024-06-21 NOTE — ED ADULT NURSE REASSESSMENT NOTE - NS ED NURSE REASSESS COMMENT FT1
Pt transferred to obs. Report given to Jigna CADET. KEVIN at this time. Ppt A&Ox4 offering no complaints. Resp even and unlabored

## 2024-06-21 NOTE — ED CDU PROVIDER DISPOSITION NOTE - CARE PROVIDER_API CALL
Teo Lakhani  Cardiovascular Disease  [address] [facility], [address] [address]  Phone: [phone]  Fax: [phone]  Follow Up Time:

## 2024-06-21 NOTE — ED ADULT NURSE REASSESSMENT NOTE - NS ED NURSE REASSESS COMMENT FT1
Assumed care of patient at 08:10 from CHICHI Shultz. Charting as noted. Patient AA&Ox4, resp even/unlabored, presents to ED c/o chest pain. At time of assessment, patient denies pain/discomfort, denies CP/SOB. Patient updated on the plan of care, awaiting CCTA and TTE. Stretcher locked in lowest position, IV site flushed w/ NS. No redness, swelling or pain noted to site. No signs of acute distress noted, safety maintained. Pt remains on CM in NSR, rate 74, SpO2 98% on room air.

## 2024-06-21 NOTE — CONSULT NOTE ADULT - NS ATTEND AMEND GEN_ALL_CORE FT
seen with above,    69M history significant for HTN, GERD/Live's following with outside cardilogist reportedly had recent exercise treadmill stress test 2 months ago, had episode of midsternal chest/neck pain more persistent then usualy GERD prompted ER visit, lab work and EKG unremarkable    -underwent Echo and CCTA with mild-moderate CAD, etiology of chest pain likely due to chronic GERD, also incidental small hiatal hernia, advised to be adherent with daily omeprazole use, add atorvastatin 20mg for nonobstructive CAD  -stable from cardiac standpoint for discharge home      Teo Lakhani DO, [facility]  Faculty Non-Invasive Cardiologist  [phone]

## 2024-06-21 NOTE — ED CDU PROVIDER INITIAL DAY NOTE - CLINICAL SUMMARY MEDICAL DECISION MAKING FREE TEXT BOX
70yo F presenting with midsternal CP, now resolved. Trop stable x 4. Evaluated by [facility] cardiology, recommending TTE and CTCA.

## 2024-06-21 NOTE — ED ADULT NURSE REASSESSMENT NOTE - NS ED NURSE REASSESS COMMENT FT1
Patient AA&Ox4, resp even/unlabored, ambulatory, steady gait noted, discharged home with all belongings. Med rec and discharge instructions explained and given to pt by ADALID Humphries. Patient verbalizes understanding on physician follow up, medication regimen and next dose, s/s of complications and monitoring. No distress noted. VSS, recorded in EMR. Peripheral IV removed, intact, bleeding controlled.

## 2024-06-21 NOTE — CONSULT NOTE ADULT - PROBLEM SELECTOR RECOMMENDATION 9
Admit and monitor on Tele for acute arrhythmias, check labs including CBC, BMP, trend CE x3,   - FLP and A1C in AM, check ECG and CXR tonight  - continue home Bystolic, low cholesterol diet, monitor lFts  - will schedule for TTE to assess functional/structural status  - NST vs. LHC based on AM labs and re-assessment  - pain management as needed (NTG/MSO4)

## 2024-06-21 NOTE — ED ADULT NURSE REASSESSMENT NOTE - NS ED NURSE REASSESS COMMENT FT1
Pt a&o x4 breathing equal and unlabored; pt denies pain at this time; pt on cardiac monitor reading 64 bpm in sinus rhyhtm on monitor.

## 2024-06-21 NOTE — ED CDU PROVIDER DISPOSITION NOTE - NSFOLLOWUPINSTRUCTIONS_ED_ALL_ED_FT
- Return to the ED for any new or worsening symptoms.   - Follow-up with Dr. Lakhani   - Start taking lipitor 20mg     Chest Pain    Chest pain can be caused by many different conditions which may or may not be dangerous. Causes include heartburn, lung infections, heart attack, blood clot in lungs, skin infections, strain or damage to muscle, cartilage, or bones, etc. In addition to a history and physical examination, an electrocardiogram (ECG) or other lab tests may have been performed to determine the cause of your chest pain. Follow up with your primary care provider or with a cardiologist as instructed.     SEEK IMMEDIATE MEDICAL CARE IF YOU HAVE ANY OF THE FOLLOWING SYMPTOMS: worsening chest pain, coughing up blood, unexplained back/neck/jaw pain, severe abdominal pain, dizziness or lightheadedness, fainting, shortness of breath, sweaty or clammy skin, vomiting, or racing heart beat. These symptoms may represent a serious problem that is an emergency. Do not wait to see if the symptoms will go away. Get medical help right away. Call 911 and do not drive yourself to the hospital. - Return to the ED for any new or worsening symptoms.   - Follow-up with Dr. Lakhani   - Start taking lipitor 20mg     Chest Pain    Chest pain can be caused by many different conditions which may or may not be dangerous. Causes include heartburn, lung infections, heart attack, blood clot in lungs, skin infections, strain or damage to muscle, cartilage, or bones, etc. In addition to a history and physical examination, an electrocardiogram (ECG) or other lab tests may have been performed to determine the cause of your chest pain. Follow up with your primary care provider or with a cardiologist as instructed.     SEEK IMMEDIATE MEDICAL CARE IF YOU HAVE ANY OF THE FOLLOWING SYMPTOMS: worsening chest pain, coughing up blood, unexplained back/neck/jaw pain, severe abdominal pain, dizziness or lightheadedness, fainting, shortness of breath, sweaty or clammy skin, vomiting, or racing heart beat. These symptoms may represent a serious problem that is an emergency. Do not wait to see if the symptoms will go away. Get medical help right away. Call 911 and do not drive yourself to the hospital.    - Please follow-up with your doctor for further evaluation of the following CT findings   *  Lungs: Few subcentimeter pulmonary nodules including a 5 mm left lower lobe   pulmonary nodule (series 14, image 40) and 4 mm right lower lobe nodule   (series 14, image 34).   - You should undergo dedicated CT chest in 6-12 months for further evaluation  * Bones/Chest wall: Asymmetrically increased soft tissue in the left   breast.   - Please follow-up for further evaluation of abnormal soft tissue in left breast

## 2024-06-21 NOTE — ED CDU PROVIDER INITIAL DAY NOTE - ATTENDING APP SHARED VISIT CONTRIBUTION OF CARE
Pt with chest pain. initial workup neg. cards consult appreciated. will put in obs for CCTA and TTE.

## 2024-06-21 NOTE — CONSULT NOTE ADULT - SUBJECTIVE AND OBJECTIVE BOX
[facility]                                              CARDIOLOGY AT [facility]                                                   [address], [place] [address]                                             Telephone: [phone]. Fax:[phone]                                                       CARDIOLOGY CONSULTATION NOTE                                                                                             History obtained by: Patient and medical record  Community Cardiologist:    obtained: Yes [  ] No [  ]  Reason for Consultation:   Available out pt records reviewed: Yes [  ] No [  ]    Chief complaint:    Patient is a 69y old  Female who presents with a chief complaint of     HPI: Patient is a 69y F w/ PMHx of GERD, Live's esophagus, HTN, rectal bleed presents to [facility] with midsternal chest pain started earlier yesterday.  Patient initially thought it was indigestion as she has very severe GERD.  When symptoms were more prolonged than usual she was worried for a cardiac cause and took 4 baby aspirin and called EMS.  Patient states her pain is much improved.  Endorses delay in taking her daily PPI medications, having a couple of alcoholic beverages for lunch earlier this afternoon prior to the onset of pain, and having potato chips for snack.  Denies shortness of breath, back pain, leg pain, recent travel.  Patient follows with a cardiologist, last evaluation back in April.      CARDIAC TESTING   ECHO:    STRESS:    CATH:     ELECTROPHYSIOLOGY:     PAST MEDICAL HISTORY  HTN (hypertension)    GERD (gastroesophageal reflux disease)    Unilateral primary osteoarthritis, left knee    Thyroid nodule    Prediabetes        PAST SURGICAL HISTORY  H/O oral surgery    H/O tubal ligation    History of cholecystectomy    S/P excision of lipoma        SOCIAL HISTORY:  Denies smoking/alcohol/drugs  CIGARETTES:     ALCOHOL:  DRUGS:    FAMILY HISTORY:  FH: Parkinson's disease (Sibling)    FH: heart attack (Mother)    FH: diabetes mellitus (Sibling)    FH: heart disease (Sibling)    FH: lung cancer (Sibling)      Family History of Cardiovascular Disease:  Yes [  ] No [  ]  Coronary Artery Disease in first degree relative: Yes [  ] No [  ]  Sudden Cardiac Death in First degree relative: Yes [  ] No [  ]    HOME MEDICATIONS:  Bystolic 5 mg oral tablet: 1 tab(s) orally once a day (26 Oct 2023 10:12)  NexIUM 40 mg oral powder for reconstitution, delayed release: 1 each orally once a day (26 Oct 2023 10:12)  Vitamin D3 1250 mcg (50,000 intl units) oral capsule: 1 cap(s) orally once a week (26 Oct 2023 10:12)      CURRENT CARDIAC MEDICATIONS:      CURRENT OTHER MEDICATIONS:      ALLERGIES:   sulfa drugs (Hives)      REVIEW OF SYMPTOMS:   CONSTITUTIONAL: No fever, no chills, no weight loss, no weight gain, no fatigue   ENMT:  No vertigo; No sinus or throat pain  NECK: No pain or stiffness  CARDIOVASCULAR: No chest pain, no dyspnea, no syncope/presyncope, no palpitations, no dizziness, no Orthopnea, no Paroxsymal nocturnal dyspnea  RESPIRATORY: no Shortness of breath, no cough, no wheezing  : No dysuria, no hematuria   GI: No dark color stool, no nausea, no diarrhea, no constipation, no abdominal pain   NEURO: No headache, no slurred speech   MUSCULOSKELETAL: No joint pain or swelling; No muscle, back, or extremity pain  PSYCH: No agitation, no anxiety.    ALL OTHER REVIEW OF SYSTEMS ARE NEGATIVE.    VITAL SIGNS:  T(C): 36.7 (06-21-24 @ 05:11), Max: 37 (06-20-24 @ 21:08)  T(F): 98.1 (06-21-24 @ 05:11), Max: 98.6 (06-20-24 @ 21:08)  HR: 71 (06-21-24 @ 05:11) (71 - 99)  BP: 164/79 (06-21-24 @ 05:11) (164/79 - 179/82)  RR: 22 (06-21-24 @ 05:11) (16 - 22)  SpO2: 99% (06-21-24 @ 05:11) (96% - 99%)    INTAKE AND OUTPUT:       PHYSICAL EXAM:  Constitutional: Comfortable . No acute distress.   HEENT: Atraumatic and normocephalic , neck is supple . no JVD. No carotid bruit.  CNS: A&Ox3. No focal deficits.   Respiratory: CTAB, unlabored   Cardiovascular: RRR normal s1 s2. No murmur. No rubs or gallop.  Gastrointestinal: Soft, non-tender. +Bowel sounds.   Extremities: 2+ Peripheral Pulses, No clubbing, cyanosis, or edema  Psychiatric: Calm . no agitation.   Skin: Warm and dry, no ulcers on extremities     LABS:                            12.3   10.49 )-----------( 343      ( 20 Jun 2024 22:20 )             38.5     06-20    143  |  105  |  15.4  ----------------------------<  135<H>  4.4   |  23.0  |  0.63    Ca    9.9      20 Jun 2024 22:20    TPro  7.3  /  Alb  4.1  /  TBili  <0.2<L>  /  DBili  x   /  AST  22  /  ALT  17  /  AlkPhos  107  06-20    PT/INR - ( 21 Jun 2024 00:38 )   PT: 10.5 sec;   INR: 0.94 ratio         PTT - ( 21 Jun 2024 00:38 )  PTT:30.3 sec  Urinalysis Basic - ( 20 Jun 2024 22:20 )    Color: x / Appearance: x / SG: x / pH: x  Gluc: 135 mg/dL / Ketone: x  / Bili: x / Urobili: x   Blood: x / Protein: x / Nitrite: x   Leuk Esterase: x / RBC: x / WBC x   Sq Epi: x / Non Sq Epi: x / Bacteria: x              INTERPRETATION OF TELEMETRY:     ECG:   Prior ECG: Yes [  ] No [  ]    RADIOLOGY & ADDITIONAL STUDIES:    X-ray:    CT scan:   MRI:   US:                                                [facility]                                              CARDIOLOGY AT [facility]                                                   [address], [place] [address]                                             Telephone: [phone]. Fax:[phone]                                                       CARDIOLOGY CONSULTATION NOTE                                                                                             History obtained by: Patient and medical record  Community Cardiologist: Dr. [facility] [place]    obtained: Yes [  ] No [  ]  Reason for Consultation: chest pain  Available out pt records reviewed: Yes [x] No [  ]    Chief complaint:  I had chest pain yesterday    HPI: Patient is a 69y F w/ PMHx of GERD, Live's esophagus, HTN, rectal bleed presents to [facility] EDU with midsternal chest pain started earlier yesterday.  Patient was resting, initially thought it was indigestion as she has very severe GERD.  When symptoms were more prolonged than usual she took 4 baby ASA and called EMS.  Patient in bed now states pain much improved.  It was dull, across her chest first, then sub sternal, 5/10 in severity, w/o radiation and mild nausea.  Denies associated shortness of breath, palpitations, diaphoresis, back pain, leg pain, recent travel, fever, or chills.  Patient follows with cardiologist Dr. Damian in [place].  Reports recent NST with normal results back in April.  Cardiology called for hsT-T: 25 -> 37 ->42.        CARDIAC TESTING   ECHO:    STRESS:    CATH:     ELECTROPHYSIOLOGY:     PAST MEDICAL HISTORY  HTN (hypertension)    GERD (gastroesophageal reflux disease)    Unilateral primary osteoarthritis, left knee    Thyroid nodule    Prediabetes        PAST SURGICAL HISTORY  H/O oral surgery    H/O tubal ligation    History of cholecystectomy    S/P excision of lipoma        SOCIAL HISTORY:  Denies smoking/alcohol/drugs  CIGARETTES:     ALCOHOL:  DRUGS:    FAMILY HISTORY:  FH: Parkinson's disease (Sibling)    FH: heart attack (Mother)    FH: diabetes mellitus (Sibling)    FH: heart disease (Sibling)    FH: lung cancer (Sibling)      Family History of Cardiovascular Disease:  Yes [  ] No [  ]  Coronary Artery Disease in first degree relative: Yes [  ] No [  ]  Sudden Cardiac Death in First degree relative: Yes [  ] No [  ]    HOME MEDICATIONS:  Bystolic 5 mg oral tablet: 1 tab(s) orally once a day (26 Oct 2023 10:12)  NexIUM 40 mg oral powder for reconstitution, delayed release: 1 each orally once a day (26 Oct 2023 10:12)  Vitamin D3 1250 mcg (50,000 intl units) oral capsule: 1 cap(s) orally once a week (26 Oct 2023 10:12)      CURRENT CARDIAC MEDICATIONS:      CURRENT OTHER MEDICATIONS:      ALLERGIES:   sulfa drugs (Hives)      REVIEW OF SYMPTOMS:   CONSTITUTIONAL: No fever, no chills, no weight loss, no weight gain, no fatigue   ENMT:  No vertigo; No sinus or throat pain  NECK: No pain or stiffness  CARDIOVASCULAR: No chest pain, no dyspnea, no syncope/presyncope, no palpitations, no dizziness, no Orthopnea, no Paroxsymal nocturnal dyspnea  RESPIRATORY: no Shortness of breath, no cough, no wheezing  : No dysuria, no hematuria   GI: No dark color stool, no nausea, no diarrhea, no constipation, no abdominal pain   NEURO: No headache, no slurred speech   MUSCULOSKELETAL: No joint pain or swelling; No muscle, back, or extremity pain  PSYCH: No agitation, no anxiety.    ALL OTHER REVIEW OF SYSTEMS ARE NEGATIVE.    VITAL SIGNS:  T(C): 36.7 (06-21-24 @ 05:11), Max: 37 (06-20-24 @ 21:08)  T(F): 98.1 (06-21-24 @ 05:11), Max: 98.6 (06-20-24 @ 21:08)  HR: 71 (06-21-24 @ 05:11) (71 - 99)  BP: 164/79 (06-21-24 @ 05:11) (164/79 - 179/82)  RR: 22 (06-21-24 @ 05:11) (16 - 22)  SpO2: 99% (06-21-24 @ 05:11) (96% - 99%)    INTAKE AND OUTPUT:       PHYSICAL EXAM:  Constitutional: Comfortable . No acute distress.   HEENT: Atraumatic and normocephalic , neck is supple . no JVD. No carotid bruit.  CNS: A&Ox3. No focal deficits.   Respiratory: CTAB, unlabored   Cardiovascular: RRR normal s1 s2. No murmur. No rubs or gallop.  Gastrointestinal: Soft, non-tender. +Bowel sounds.   Extremities: 2+ Peripheral Pulses, No clubbing, cyanosis, or edema  Psychiatric: Calm . no agitation.   Skin: Warm and dry, no ulcers on extremities     LABS:                            12.3   10.49 )-----------( 343      ( 20 Jun 2024 22:20 )             38.5     06-20    143  |  105  |  15.4  ----------------------------<  135<H>  4.4   |  23.0  |  0.63    Ca    9.9      20 Jun 2024 22:20    TPro  7.3  /  Alb  4.1  /  TBili  <0.2<L>  /  DBili  x   /  AST  22  /  ALT  17  /  AlkPhos  107  06-20    PT/INR - ( 21 Jun 2024 00:38 )   PT: 10.5 sec;   INR: 0.94 ratio         PTT - ( 21 Jun 2024 00:38 )  PTT:30.3 sec  Urinalysis Basic - ( 20 Jun 2024 22:20 )    Color: x / Appearance: x / SG: x / pH: x  Gluc: 135 mg/dL / Ketone: x  / Bili: x / Urobili: x   Blood: x / Protein: x / Nitrite: x   Leuk Esterase: x / RBC: x / WBC x   Sq Epi: x / Non Sq Epi: x / Bacteria: x              INTERPRETATION OF TELEMETRY:     ECG:   Prior ECG: Yes [  ] No [  ]    RADIOLOGY & ADDITIONAL STUDIES:    X-ray:    CT scan:   MRI:   US:                                                [facility]                                              CARDIOLOGY AT [facility]                                                   [address], [place] [address]                                             Telephone: [phone]. Fax:[phone]                                                       CARDIOLOGY CONSULTATION NOTE                                                                                             History obtained by: Patient and medical record  Community Cardiologist: Dr. [facility] [place]    obtained: Yes [  ] No [  ]  Reason for Consultation: chest pain  Available out pt records reviewed: Yes [x] No [  ]    Chief complaint:  I had chest pain yesterday    HPI: Patient is a 69y F w/ PMHx of GERD, Live's esophagus, HTN, rectal bleed presents to [facility] EDU with midsternal chest pain started earlier yesterday.  Patient was resting, initially thought it was indigestion as she has very severe GERD.  When symptoms were more prolonged than usual she took 4 baby ASA and called EMS.  Patient in bed now states pain much improved.  It was dull, across her chest first, then sub sternal, 5/10 in severity, w/o radiation and mild nausea.  Denies associated shortness of breath, palpitations, diaphoresis, back pain, leg pain, recent travel, fever, or chills.  Patient follows with cardiologist Dr. Damian in [place].  Reports recent NST with normal results back in April.  Cardiology called for hsT-T: 25 -> 37 ->42.      CARDIAC TESTING   ECHO: Pen  STRESS:  CATH:   ELECTROPHYSIOLOGY:     PAST MEDICAL HISTORY  HTN (hypertension)  GERD (gastroesophageal reflux disease)  Unilateral primary osteoarthritis, left knee  Thyroid nodule  Prediabetes    PAST SURGICAL HISTORY  H/O oral surgery  H/O tubal ligation  History of cholecystectomy  S/P excision of lipoma    SOCIAL HISTORY:  Denies smoking/alcohol/drugs    FAMILY HISTORY:  FH: Parkinson's disease (Sibling)  FH: heart attack (Mother)  FH: diabetes mellitus (Sibling)  FH: heart disease (Sibling)  FH: lung cancer (Sibling)    Family History of Cardiovascular Disease:  Yes [  ] No [  ]  Coronary Artery Disease in first degree relative: Yes [  ] No [  ]  Sudden Cardiac Death in First degree relative: Yes [  ] No [  ]    HOME MEDICATIONS:  Bystolic 5 mg oral tablet: 1 tab(s) orally once a day (26 Oct 2023 10:12)  NexIUM 40 mg oral powder for reconstitution, delayed release: 1 each orally once a day (26 Oct 2023 10:12)  Vitamin D3 1250 mcg (50,000 intl units) oral capsule: 1 cap(s) orally once a week (26 Oct 2023 10:12)    ALLERGIES: sulfa drugs (Hives)    REVIEW OF SYMPTOMS:   CONSTITUTIONAL: No fever, no chills, no weight loss, no weight gain, no fatigue   ENMT:  No vertigo; No sinus or throat pain  NECK: No pain or stiffness  CARDIOVASCULAR: + chest pain, no dyspnea, no syncope/presyncope, no palpitations, no dizziness, no Orthopnea, no Paroxsymal nocturnal dyspnea  RESPIRATORY: no Shortness of breath, no cough, no wheezing  GI: no nausea, no diarrhea, no constipation, no abdominal pain   NEURO: No headache, no slurred speech   MUSCULOSKELETAL: No joint pain or swelling  PSYCH: No agitation, no anxiety    ALL OTHER REVIEW OF SYSTEMS ARE NEGATIVE.    VITAL SIGNS:  T(C): 36.7 (06-21-24 @ 05:11), Max: 37 (06-20-24 @ 21:08)  T(F): 98.1 (06-21-24 @ 05:11), Max: 98.6 (06-20-24 @ 21:08)  HR: 71 (06-21-24 @ 05:11) (71 - 99)  BP: 164/79 (06-21-24 @ 05:11) (164/79 - 179/82)  RR: 22 (06-21-24 @ 05:11) (16 - 22)  SpO2: 99% (06-21-24 @ 05:11) (96% - 99%)    INTAKE AND OUTPUT:     PHYSICAL EXAM:  Constitutional: Comfortable, no acute distress   HEENT: Atraumatic, neck is supple, no JVD  CNS: A&Ox3. No focal deficits.   Respiratory: CTAB, unlabored   Cardiovascular: RRR, S1S2, no murmur or rubs  Gastrointestinal: Soft, non-tender   Extremities: 2+ Peripheral Pulses, no cyanosis, or edema  Psychiatric: Calm  Skin: Warm and dry, no ulcers on extremities     LABS:                       12.3   10.49 )-----------( 343      ( 20 Jun 2024 22:20 )             38.5     06-20    143  |  105  |  15.4  ----------------------------<  135<H>  4.4   |  23.0  |  0.63    Ca    9.9      20 Jun 2024 22:20    TPro  7.3  /  Alb  4.1  /  TBili  <0.2<L>  /  DBili  x   /  AST  22  /  ALT  17  /  AlkPhos  107  06-20    PT/INR - ( 21 Jun 2024 00:38 )   PT: 10.5 sec;   INR: 0.94 ratio       PTT - ( 21 Jun 2024 00:38 )  PTT:30.3 sec  Urinalysis Basic - ( 20 Jun 2024 22:20 )  Color: x / Appearance: x / SG: x / pH: x  Gluc: 135 mg/dL / Ketone: x  / Bili: x / Urobili: x   Blood: x / Protein: x / Nitrite: x   Leuk Esterase: x / RBC: x / WBC x   Sq Epi: x / Non Sq Epi: x / Bacteria: x    INTERPRETATION OF TELEMETRY: SR no ectopy  ECG: NSR@95bpm, no acute T or ST changes   Prior ECG: Yes [  ] No [x]    RADIOLOGY & ADDITIONAL STUDIES:    X-ray:  Pen  CT scan:   MRI:   US:

## 2024-06-21 NOTE — ED CDU PROVIDER INITIAL DAY NOTE - OBJECTIVE STATEMENT
69y F PMHx of GERD, Live's esophagus, HTN, rectal bleed presented to ED c/o midsternal CP onset ~6pm yesterday. Patient was resting, initially thought it was indigestion however symptoms were not consistent with her GERD so took 4 baby ASA and called EMS. States discomfort started midsternal and radiated toward b/l shoulder blades. States she does not usually get bad indigestion because she takes omeprazole. Denies associated shortness of breath, palpitations, diaphoresis, leg pain, recent travel, fever, or chills.  Patient follows with cardiologist Dr. Bergman in [place].  Reports recent NST with normal results back in April. hsT-T: 25 ->37 ->42 ->38. Pt now resting comfortably, no CP overnight or this morning. [facility] cardiology recommending TTE and CTCA.

## 2024-06-21 NOTE — CONSULT NOTE ADULT - ASSESSMENT
69y F w/ Live's esophagus, HTN, presents to [facility] with midsternal chest pain started earlier yesterday.   Pain was dull, across her chest first, then sub sternal, 5/10 in severity, w/o radiation and mild nausea.  Denies associated shortness of breath, palpitations, diaphoresis, back pain, leg pain, recent travel, fever, or chills.  Patient follows with cardiologist Dr. Damian in [place].  Reports recent NST with normal results back in April.  Cardiology called for hsT-T: 25 -> 37 ->42    ECG: NSR no acute T or ST changes

## 2024-06-21 NOTE — ED CDU PROVIDER INITIAL DAY NOTE - NS ED ATTENDING STATEMENT MOD
This was a shared visit with the VICKI. I reviewed and verified the documentation. Universal Safety Interventions

## 2024-06-21 NOTE — ED CDU PROVIDER DISPOSITION NOTE - PATIENT PORTAL LINK FT
You can access the FollowMyHealth Patient Portal offered by [facility] by registering at the following website: [email or web address]. By joining [facility]’s FollowMyHealth portal, you will also be able to view your health information using other applications (apps) compatible with our system.

## 2024-06-21 NOTE — ED ADULT NURSE REASSESSMENT NOTE - NS ED NURSE REASSESS COMMENT FT1
Pt care assumed @ 0715. Pt resting in bed. VSS. Resp even and unlabored. Awaiting further plan of care at this time. Pt updated on plan of care and verbalizes understanding.

## 2024-06-21 NOTE — ED CDU PROVIDER DISPOSITION NOTE - ATTENDING CONTRIBUTION TO CARE
CCTA and TTE reviewed. cards recommended lipitor and dc with outpatient f/up. given return instructions.

## 2024-07-09 ENCOUNTER — APPOINTMENT (OUTPATIENT)
Dept: ORTHOPEDIC SURGERY | Facility: CLINIC | Age: 69
End: 2024-07-09
Payer: MEDICARE

## 2024-07-09 DIAGNOSIS — Z96.652 AFTERCARE FOLLOWING JOINT REPLACEMENT SURGERY: ICD-10-CM

## 2024-07-09 DIAGNOSIS — Z47.1 AFTERCARE FOLLOWING JOINT REPLACEMENT SURGERY: ICD-10-CM

## 2024-07-09 PROCEDURE — 73562 X-RAY EXAM OF KNEE 3: CPT | Mod: LT

## 2024-07-09 PROCEDURE — 99213 OFFICE O/P EST LOW 20 MIN: CPT

## 2024-07-23 ENCOUNTER — NON-APPOINTMENT (OUTPATIENT)
Age: 69
End: 2024-07-23

## 2024-08-06 ENCOUNTER — NON-APPOINTMENT (OUTPATIENT)
Age: 69
End: 2024-08-06

## 2024-08-06 ENCOUNTER — APPOINTMENT (OUTPATIENT)
Dept: CARDIOLOGY | Facility: CLINIC | Age: 69
End: 2024-08-06

## 2024-08-06 PROBLEM — I25.10 CORONARY ARTERY DISEASE INVOLVING NATIVE CORONARY ARTERY OF NATIVE HEART WITHOUT ANGINA PECTORIS: Status: ACTIVE | Noted: 2024-08-06

## 2024-08-06 PROBLEM — I77.9 CAROTID ARTERY DISEASE: Status: ACTIVE | Noted: 2024-08-06

## 2024-08-06 PROBLEM — R91.8 LUNG NODULES: Status: ACTIVE | Noted: 2024-08-06

## 2024-08-06 PROCEDURE — 93000 ELECTROCARDIOGRAM COMPLETE: CPT

## 2024-08-06 PROCEDURE — 99215 OFFICE O/P EST HI 40 MIN: CPT | Mod: 25

## 2024-08-06 NOTE — REVIEW OF SYSTEMS
[Feeling Fatigued] : feeling fatigued [SOB] : no shortness of breath [Dyspnea on exertion] : not dyspnea during exertion [Chest Discomfort] : no chest discomfort [Lower Ext Edema] : no extremity edema [Palpitations] : no palpitations [Syncope] : no syncope [Dizziness] : no dizziness [FreeTextEntry5] : see HPI for neck discomfort

## 2024-08-06 NOTE — DISCUSSION/SUMMARY
[FreeTextEntry1] : 69-year-old female, former smoker (quit about 6 years ago) with history of Live's esophagus, HTN, carotid artery disease, GERD, thyroid nodule, prediabetes, knee replacement, GIB follow knee replacement (was told secondary to anti-inflammatory meds), who presents to the office for follow up after hospitalization June 2024. She went to [facility] ED with chest pain, although she describes it more of a neck pain "pulling the muscles", and HTN. She used to follow with cardiologist Dr. Marr in [place], had a recent NST with normal results back in April. Cardiology called for hsT-T: 25 -> 37 ->42 ECG: NSR no acute T or ST changes. She underwent TTE which revealed LVEF 55-60%, moderate calcification of mitral valve, mild MR. CCTA 6/21/2024 revealed mild-mod CAD, etiology of chest pain thought to be related to GERD and incidental finding of small hiatal hernia, lung nodule and abnormal breast tissue.  Pt wants to follow with Dr. Lakhani rather than previous cardiologist.   Assessment/ Plan: 1. CAD: mild - mod - denied chest pain, dyspnea.  - continue Atorvastatin 20mg daily. Repeat lipid panel ordered prior to next visit.  - encouraged healthy diet and routine exercise.   2. HTN: uncontrolled. - per patient was on HCTZ in the past but was discontinued. Advised patient to send records from her old cardiologist for review.  - Currently on low dose Bystolic, cannot increase due to HR 60s. Pt prefers to limit total medications needed. Discussed options for HTN management and possible SE of CCB, Diuretic and ARB. Patient preferred to try ARB. Start Losartan 25mg daily. Stop bystolic. CMP ordered to be done prior to next visit.  - Advised to keep BP log and bring to next visit with home machine.  - low sodium diet recommended.  3. Pulm referral recommended to follow lung nodule and due to history of smoking.   4. Pt has GYN f/u already and will have mammogram to assess breast tissue.   5. Carotid artery disease- records from previous cardiologist requested.   6. Recommended GI follow up due to GERD and hiatal hernia.   7. Follow up in 2-3 weeks for reassessment of BP and HR.  Patient was advised to contact the office or seek emergency medical care for any new, worsening or concerning symptoms. Patient verbalized understanding and is in agreement with the above plan.  Bianca Pradhan was present in office at the time of visit. [EKG obtained to assist in diagnosis and management of assessed problem(s)] : EKG obtained to assist in diagnosis and management of assessed problem(s)

## 2024-08-06 NOTE — CARDIOLOGY SUMMARY
[de-identified] : 8/6/2024: NSR, LAE, QTc 410 [de-identified] : TTE 6/2024 CONCLUSIONS:   1. Left ventricular systolic function is normal with an ejection fraction of 62 % by Castro's method of disks with an ejection fraction visually estimated at 55 to 60 %.  2. Normal left ventricular diastolic function.  3. Normal right ventricular cavity size and normal systolic function.  4. Pulmonary artery systolic pressure could not be estimated.  5. Normal left and right atrial size.  6. There is moderate posterior calcification of the mitral valve annulus.  7. Mild mitral regurgitation.  8. No pericardial effusion seen.  9. No prior echocardiogram is available for comparison.

## 2024-08-06 NOTE — PHYSICAL EXAM
[Well Developed] : well developed [No Acute Distress] : no acute distress [No Carotid Bruit] : no carotid bruit [Normal S1, S2] : normal S1, S2 [No Murmur] : no murmur [Clear Lung Fields] : clear lung fields [No Respiratory Distress] : no respiratory distress  [Normal Gait] : normal gait [No Edema] : no edema [Moves all extremities] : moves all extremities [Normal Speech] : normal speech [Alert and Oriented] : alert and oriented [Normal memory] : normal memory

## 2024-08-13 ENCOUNTER — LABORATORY RESULT (OUTPATIENT)
Age: 69
End: 2024-08-13

## 2024-08-13 LAB
ALBUMIN SERPL ELPH-MCNC: 4.3 G/DL
ALP BLD-CCNC: 100 U/L
ALT SERPL-CCNC: 19 U/L
ANION GAP SERPL CALC-SCNC: 16 MMOL/L
AST SERPL-CCNC: 19 U/L
BASOPHILS # BLD AUTO: 0.06 K/UL
BASOPHILS NFR BLD AUTO: 0.7 %
BILIRUB SERPL-MCNC: 0.4 MG/DL
BUN SERPL-MCNC: 15 MG/DL
CALCIUM SERPL-MCNC: 9.9 MG/DL
CHLORIDE SERPL-SCNC: 101 MMOL/L
CO2 SERPL-SCNC: 23 MMOL/L
CREAT SERPL-MCNC: 0.63 MG/DL
EGFR: 97 ML/MIN/1.73M2
EOSINOPHIL # BLD AUTO: 0.3 K/UL
EOSINOPHIL NFR BLD AUTO: 3.5 %
GLUCOSE SERPL-MCNC: 99 MG/DL
HCT VFR BLD CALC: 39.7 %
HGB BLD-MCNC: 12.4 G/DL
IMM GRANULOCYTES NFR BLD AUTO: 0.6 %
LYMPHOCYTES # BLD AUTO: 1.91 K/UL
LYMPHOCYTES NFR BLD AUTO: 22.2 %
MAN DIFF?: NORMAL
MCHC RBC-ENTMCNC: 25.6 PG
MCHC RBC-ENTMCNC: 31.2 GM/DL
MCV RBC AUTO: 82 FL
MONOCYTES # BLD AUTO: 0.79 K/UL
MONOCYTES NFR BLD AUTO: 9.2 %
NEUTROPHILS # BLD AUTO: 5.48 K/UL
NEUTROPHILS NFR BLD AUTO: 63.8 %
PLATELET # BLD AUTO: 343 K/UL
POTASSIUM SERPL-SCNC: 4.5 MMOL/L
PROT SERPL-MCNC: 7.2 G/DL
RBC # BLD: 4.84 M/UL
RBC # FLD: 18.1 %
SODIUM SERPL-SCNC: 139 MMOL/L
T3 SERPL-MCNC: 151 NG/DL
T4 FREE SERPL-MCNC: 1.3 NG/DL
TSH SERPL-ACNC: 0.79 UIU/ML
WBC # FLD AUTO: 8.59 K/UL

## 2024-08-14 ENCOUNTER — NON-APPOINTMENT (OUTPATIENT)
Age: 69
End: 2024-08-14

## 2024-08-15 ENCOUNTER — NON-APPOINTMENT (OUTPATIENT)
Age: 69
End: 2024-08-15

## 2024-08-15 ENCOUNTER — APPOINTMENT (OUTPATIENT)
Dept: ENDOCRINOLOGY | Facility: CLINIC | Age: 69
End: 2024-08-15

## 2024-08-15 VITALS
SYSTOLIC BLOOD PRESSURE: 144 MMHG | OXYGEN SATURATION: 97 % | HEART RATE: 88 BPM | WEIGHT: 174 LBS | BODY MASS INDEX: 27.97 KG/M2 | DIASTOLIC BLOOD PRESSURE: 82 MMHG | HEIGHT: 66 IN

## 2024-08-15 DIAGNOSIS — I10 ESSENTIAL (PRIMARY) HYPERTENSION: ICD-10-CM

## 2024-08-15 DIAGNOSIS — E78.2 MIXED HYPERLIPIDEMIA: ICD-10-CM

## 2024-08-15 DIAGNOSIS — E04.2 NONTOXIC MULTINODULAR GOITER: ICD-10-CM

## 2024-08-15 DIAGNOSIS — M81.0 AGE-RELATED OSTEOPOROSIS W/OUT CURRENT PATHOLOGICAL FRACTURE: ICD-10-CM

## 2024-08-15 DIAGNOSIS — E11.65 TYPE 2 DIABETES MELLITUS WITH HYPERGLYCEMIA: ICD-10-CM

## 2024-08-15 PROCEDURE — 99214 OFFICE O/P EST MOD 30 MIN: CPT

## 2024-08-15 NOTE — PHYSICAL EXAM
[Alert] : alert [Well Nourished] : well nourished [No Acute Distress] : no acute distress [Well Developed] : well developed [Normal Sclera/Conjunctiva] : normal sclera/conjunctiva [EOMI] : extra ocular movement intact [No Proptosis] : no proptosis [Normal Oropharynx] : the oropharynx was normal [Thyroid Not Enlarged] : the thyroid was not enlarged [No Respiratory Distress] : no respiratory distress [No Accessory Muscle Use] : no accessory muscle use [Clear to Auscultation] : lungs were clear to auscultation bilaterally [Normal S1, S2] : normal S1 and S2 [Normal Rate] : heart rate was normal [Regular Rhythm] : with a regular rhythm [No Edema] : no peripheral edema [Pedal Pulses Normal] : the pedal pulses are present [Normal Bowel Sounds] : normal bowel sounds [Not Tender] : non-tender [Not Distended] : not distended [Soft] : abdomen soft [Normal Anterior Cervical Nodes] : no anterior cervical lymphadenopathy [Normal Gait] : normal gait [No Rash] : no rash [No Tremors] : no tremors [Oriented x3] : oriented to person, place, and time [Acanthosis Nigricans] : no acanthosis nigricans [de-identified] : R thyroid enlarged

## 2024-08-15 NOTE — ASSESSMENT
[FreeTextEntry1] : NTMNG  declines compressive sx  pt euthyroid clinically and biochemically. FNA MArch 2021 bening .  indeterminate LN on L side but stable. It was FNA with benign pt  check thyroid US to evaluate architecture  Dm2: A1c now 6..6 . New onset  start lifestyle changes, low carbs diet, exercise  next apt will consider medication   Overweight: gained another 8 lbs . Recent knee replacement.  encouraged more exercise walking 30 min 3 x week if tolerated   HLD: cont statin . Ca score high on chest CT.  HTN : I advised low fat/low cholesterol diet, low salt diet  osteopenia : DXA Jan 2021 FRAX score 18.7 / 1.3 %.  continue calcium and vitamin d    all lab results reviewed and discussed with patient with extensive discussion. All questions answered Laboratory tests ordered today Continue other current medications

## 2024-08-26 ENCOUNTER — APPOINTMENT (OUTPATIENT)
Dept: CARDIOLOGY | Facility: CLINIC | Age: 69
End: 2024-08-26
Payer: MEDICARE

## 2024-08-26 VITALS
HEART RATE: 98 BPM | WEIGHT: 167 LBS | BODY MASS INDEX: 26.84 KG/M2 | SYSTOLIC BLOOD PRESSURE: 150 MMHG | HEIGHT: 66 IN | DIASTOLIC BLOOD PRESSURE: 62 MMHG | OXYGEN SATURATION: 98 %

## 2024-08-26 DIAGNOSIS — K44.9 DIAPHRAGMATIC HERNIA W/OUT OBSTRUCTION OR GANGRENE: ICD-10-CM

## 2024-08-26 DIAGNOSIS — I10 ESSENTIAL (PRIMARY) HYPERTENSION: ICD-10-CM

## 2024-08-26 DIAGNOSIS — I25.10 ATHEROSCLEROTIC HEART DISEASE OF NATIVE CORONARY ARTERY W/OUT ANGINA PECTORIS: ICD-10-CM

## 2024-08-26 PROCEDURE — 99214 OFFICE O/P EST MOD 30 MIN: CPT

## 2024-08-26 PROCEDURE — G2211 COMPLEX E/M VISIT ADD ON: CPT

## 2024-08-26 RX ORDER — NEBIVOLOL 10 MG/1
10 TABLET ORAL
Qty: 90 | Refills: 3 | Status: ACTIVE | COMMUNITY
Start: 1900-01-01 | End: 1900-01-01

## 2024-08-26 RX ORDER — OMEPRAZOLE 40 MG/1
40 CAPSULE, DELAYED RELEASE ORAL
Qty: 90 | Refills: 2 | Status: ACTIVE | COMMUNITY
Start: 2024-08-26 | End: 1900-01-01

## 2024-08-26 NOTE — HISTORY OF PRESENT ILLNESS
[FreeTextEntry1] : 69F h/o former smoker (quit about 6 years ago) with history of Live's esophagus, HTN, carotid artery diease, GERD, thyroid nodule, prediabetes, knee replacement, GIB follow knee replacement (was told secondary to anti-inflammatory meds), who presents to the office for follow up after hospitalization June 2024. She went to [facility] ED with chest pain, although she describes it more of a neck pain "pulling the muscles", and HTN. She used to follow with cardiologist Dr. Marr in [place], had a recent NST with normal results back in April. Cardiology called for hsT-T: 25 -> 37 ->42 ECG: NSR no acute T or ST changes. She underwent TTE which revealed LVEF 55-60%, moderate calcification of mitral valve, mild MR. CCTA 6/21/2024 revealed mild-mod CAD, etiology of chest pain thought to be related to GERD and incidental finding of small hiatal hernia, lung nodule and abnormal breast tissue, last seen 8/6/24 switched Bystolic to losartan, returns for follow up.  Reports feeling shakiness sensation since with losartan use and still seeing SBP 160s, feels better when she was on the Bystolic, HR has been 80-90s. She has not yet schedule appointment with pulmonary regarding the incidental lung nodule.  Has been following with vascular Dr. Gross for monitoring of carotid artery disease. Prior cardiologist in [place]    Prior visit 8/6/24:  Today, she reports feeling well. Reports having one episode of neck pain, like she had in the hospital, since discharge, occurring at night when laying down, lasting about 30 mins. Reports feeling tired sometimes, she attributes this to her iron deficiency. Denies chest pain, SOB at rest, BARLOW, palpitations, lightheadedness, dizziness, syncope, near syncope and LE edema. Denies smoking (quit about 6 years ago), excessive alcohol and illicit drug use. She has not been exercising regularly recently, was in PT after knee replacement.

## 2024-08-26 NOTE — CARDIOLOGY SUMMARY
[de-identified] : 8/6/2024: NSR, LAE, QTc 410 [de-identified] : TTE 6/2024 CONCLUSIONS:   1. Left ventricular systolic function is normal with an ejection fraction of 62 % by Castro's method of disks with an ejection fraction visually estimated at 55 to 60 %.  2. Normal left ventricular diastolic function.  3. Normal right ventricular cavity size and normal systolic function.  4. Pulmonary artery systolic pressure could not be estimated.  5. Normal left and right atrial size.  6. There is moderate posterior calcification of the mitral valve annulus.  7. Mild mitral regurgitation.  8. No pericardial effusion seen.  9. No prior echocardiogram is available for comparison. [de-identified] : 6/20/24  CORONARY: DOMINANCE: left LEFT MAIN CORONARY ARTERY: normal ANTERIOR DESCENDING ARTERY: 1-24% stenosis with calcified plaque at the  proximal region, distal LAD wraps around the posterior LV apex. DIAGONAL 1:patent; DIAGONAL 2: not present RAMUS INTERMEDIUS: not present CIRCUMFLEX ARTERY: 25-49% noncalcified plaque at the ostial LCx. Obtuse  marginal (OM): normal Left posterior descending artery (PDA): probably  normal RIGHT CORONARY ARTERY:  small caliber nondominant vessel, ostial RCA with  mild focal calcification. Myocardial Function: The left ventricle is of normal size and wall thickness. Cine display  demonstrates no regional wall motion abnormality. LVEDV= 119 cc; LVESV=  13 cc. Calculated ejection fraction is >75%.   Non Cardiac Findings: *  Lungs: No focal consolidation in the imaged portions of lungs. Few  subcentimeter pulmonary nodules including a 5 mm left lower lobe  pulmonary nodule (series 14, image 40) and 4 mm right lower lobe nodule  (series 14, image 34). Mild lingular atelectasis. *  Aorta: No calcification in the imaged aorta. *  Upper abdomen: Small hiatal hernia. Bones/Chest wall: Asymmetrically increased soft tissue in the left  breast. Multilevel degenerative disc disease.   IMPRESSION:  Cardiac: CAD RADS 2, mild-to-moderate CAD with 25-49% stenosis at the  ostial LCx and 1-24% stenosis at the proximal LAD.  Coronary artery calcium score:37. Between 50-75 percentile. Mild coronary  calcification.  Normal LV size and function.   Non Cardiac:  Asymmetrically increased soft tissue in the left breast. Correlate with  dedicated breast imaging.  Few subcentimeter pulmonary nodules measuring up to 5 mm, for which  dedicated CT chest can be obtained in 12 months.

## 2024-08-26 NOTE — DISCUSSION/SUMMARY
[FreeTextEntry1] : 69F h/o former smoker (quit about 6 years ago) with history of Live's esophagus, HTN, carotid artery diease, GERD, thyroid nodule, prediabetes, knee replacement, GIB follow knee replacement (was told secondary to anti-inflammatory meds), who presents to the office for follow up after hospitalization June 2024. She went to [facility] ED with chest pain, although she describes it more of a neck pain "pulling the muscles", and HTN. She used to follow with cardiologist Dr. Marr in [place], had a recent NST with normal results back in April. Cardiology called for hsT-T: 25 -> 37 ->42 ECG: NSR no acute T or ST changes. She underwent TTE which revealed LVEF 55-60%, moderate calcification of mitral valve, mild MR. CCTA 6/21/2024 revealed mild-mod CAD, etiology of chest pain thought to be related to GERD and incidental finding of small hiatal hernia, lung nodule and abnormal breast tissue, last seen 8/6/24 switched Bystolic to losartan, returns for follow up.  Intolerant to losartan use and wants to retry Bystolic, given improved HR 90s will increase dose to 10mg for goal BP <140/90   CAD: mild - mod - denied chest pain, dyspnea.  - continue Atorvastatin 20mg daily. Repeat lipid panel ordered prior to next visit for goal LDL <70 - encouraged healthy diet and routine exercise.   HTN: uncontrolled. - restart Bystolic but at higher dose 10mg and titrate for goal BP <140/90 as long as HR >60 - Advised to keep BP log and bring to next visit with home machine.  - low sodium diet recommended.  Pulmonary nodules- 5 mm, advised pulmonary eval. and need repeat CT chest in 12 months given former smoker.  Carotid artery disease- following with outside vascular surgeon for repeat carotid Duplex  GERD with hiatal hernia- continue omeprazole 40mg.    Follow up in 4 months.

## 2024-09-06 ENCOUNTER — NON-APPOINTMENT (OUTPATIENT)
Age: 69
End: 2024-09-06

## 2024-09-11 ENCOUNTER — RX ONLY (RX ONLY)
Age: 69
End: 2024-09-11

## 2024-09-11 ENCOUNTER — OFFICE (OUTPATIENT)
Dept: URBAN - METROPOLITAN AREA CLINIC 115 | Facility: CLINIC | Age: 69
Setting detail: OPHTHALMOLOGY
End: 2024-09-11
Payer: MEDICARE

## 2024-09-11 DIAGNOSIS — H04.123: ICD-10-CM

## 2024-09-11 DIAGNOSIS — H25.13: ICD-10-CM

## 2024-09-11 DIAGNOSIS — H35.033: ICD-10-CM

## 2024-09-11 DIAGNOSIS — H25.11: ICD-10-CM

## 2024-09-11 PROCEDURE — 99214 OFFICE O/P EST MOD 30 MIN: CPT | Performed by: OPHTHALMOLOGY

## 2024-09-11 PROCEDURE — 92136 OPHTHALMIC BIOMETRY: CPT | Mod: RT | Performed by: OPHTHALMOLOGY

## 2024-09-11 PROCEDURE — 92250 FUNDUS PHOTOGRAPHY W/I&R: CPT | Performed by: OPHTHALMOLOGY

## 2024-09-11 ASSESSMENT — CONFRONTATIONAL VISUAL FIELD TEST (CVF)
OD_FINDINGS: FULL
OS_FINDINGS: FULL

## 2024-09-25 ENCOUNTER — OFFICE (OUTPATIENT)
Dept: URBAN - METROPOLITAN AREA CLINIC 94 | Facility: CLINIC | Age: 69
Setting detail: OPHTHALMOLOGY
End: 2024-09-25
Payer: MEDICARE

## 2024-09-25 ENCOUNTER — EMERGENCY (EMERGENCY)
Facility: HOSPITAL | Age: 69
LOS: 1 days | Discharge: HOME | End: 2024-09-25
Attending: EMERGENCY MEDICINE
Payer: MEDICARE

## 2024-09-25 VITALS
SYSTOLIC BLOOD PRESSURE: 176 MMHG | OXYGEN SATURATION: 100 % | HEART RATE: 74 BPM | TEMPERATURE: 98 F | RESPIRATION RATE: 20 BRPM | HEIGHT: 66 IN | WEIGHT: 171.08 LBS | DIASTOLIC BLOOD PRESSURE: 79 MMHG

## 2024-09-25 DIAGNOSIS — H25.12: ICD-10-CM

## 2024-09-25 DIAGNOSIS — Z98.51 TUBAL LIGATION STATUS: Chronic | ICD-10-CM

## 2024-09-25 DIAGNOSIS — H25.13: ICD-10-CM

## 2024-09-25 DIAGNOSIS — H25.11: ICD-10-CM

## 2024-09-25 DIAGNOSIS — H04.123: ICD-10-CM

## 2024-09-25 DIAGNOSIS — Z98.890 OTHER SPECIFIED POSTPROCEDURAL STATES: Chronic | ICD-10-CM

## 2024-09-25 DIAGNOSIS — Z90.49 ACQUIRED ABSENCE OF OTHER SPECIFIED PARTS OF DIGESTIVE TRACT: Chronic | ICD-10-CM

## 2024-09-25 DIAGNOSIS — H35.363: ICD-10-CM

## 2024-09-25 DIAGNOSIS — H35.033: ICD-10-CM

## 2024-09-25 LAB
ALBUMIN SERPL ELPH-MCNC: 4.1 G/DL — SIGNIFICANT CHANGE UP (ref 3.3–5.2)
ALP SERPL-CCNC: 105 U/L — SIGNIFICANT CHANGE UP (ref 40–120)
ALT FLD-CCNC: 17 U/L — SIGNIFICANT CHANGE UP
ANION GAP SERPL CALC-SCNC: 14 MMOL/L — SIGNIFICANT CHANGE UP (ref 5–17)
APPEARANCE UR: ABNORMAL
AST SERPL-CCNC: 18 U/L — SIGNIFICANT CHANGE UP
BACTERIA # UR AUTO: ABNORMAL /HPF
BASOPHILS # BLD AUTO: 0.04 K/UL — SIGNIFICANT CHANGE UP (ref 0–0.2)
BASOPHILS NFR BLD AUTO: 0.3 % — SIGNIFICANT CHANGE UP (ref 0–2)
BILIRUB SERPL-MCNC: 0.6 MG/DL — SIGNIFICANT CHANGE UP (ref 0.4–2)
BILIRUB UR-MCNC: NEGATIVE — SIGNIFICANT CHANGE UP
BUN SERPL-MCNC: 8.8 MG/DL — SIGNIFICANT CHANGE UP (ref 8–20)
CALCIUM SERPL-MCNC: 9.3 MG/DL — SIGNIFICANT CHANGE UP (ref 8.4–10.5)
CAST: 0 /LPF — SIGNIFICANT CHANGE UP (ref 0–4)
CHLORIDE SERPL-SCNC: 105 MMOL/L — SIGNIFICANT CHANGE UP (ref 96–108)
CO2 SERPL-SCNC: 23 MMOL/L — SIGNIFICANT CHANGE UP (ref 22–29)
COLOR SPEC: YELLOW — SIGNIFICANT CHANGE UP
CREAT SERPL-MCNC: 0.55 MG/DL — SIGNIFICANT CHANGE UP (ref 0.5–1.3)
DIFF PNL FLD: ABNORMAL
EGFR: 100 ML/MIN/1.73M2 — SIGNIFICANT CHANGE UP
EOSINOPHIL # BLD AUTO: 0.18 K/UL — SIGNIFICANT CHANGE UP (ref 0–0.5)
EOSINOPHIL NFR BLD AUTO: 1.2 % — SIGNIFICANT CHANGE UP (ref 0–6)
GLUCOSE SERPL-MCNC: 114 MG/DL — HIGH (ref 70–99)
GLUCOSE UR QL: NEGATIVE MG/DL — SIGNIFICANT CHANGE UP
HCT VFR BLD CALC: 40.1 % — SIGNIFICANT CHANGE UP (ref 34.5–45)
HGB BLD-MCNC: 12.8 G/DL — SIGNIFICANT CHANGE UP (ref 11.5–15.5)
IMM GRANULOCYTES NFR BLD AUTO: 0.7 % — SIGNIFICANT CHANGE UP (ref 0–0.9)
KETONES UR-MCNC: ABNORMAL MG/DL
LEUKOCYTE ESTERASE UR-ACNC: ABNORMAL
LYMPHOCYTES # BLD AUTO: 1.74 K/UL — SIGNIFICANT CHANGE UP (ref 1–3.3)
LYMPHOCYTES # BLD AUTO: 11.5 % — LOW (ref 13–44)
MCHC RBC-ENTMCNC: 26.3 PG — LOW (ref 27–34)
MCHC RBC-ENTMCNC: 31.9 GM/DL — LOW (ref 32–36)
MCV RBC AUTO: 82.5 FL — SIGNIFICANT CHANGE UP (ref 80–100)
MONOCYTES # BLD AUTO: 1.03 K/UL — HIGH (ref 0–0.9)
MONOCYTES NFR BLD AUTO: 6.8 % — SIGNIFICANT CHANGE UP (ref 2–14)
NEUTROPHILS # BLD AUTO: 12.04 K/UL — HIGH (ref 1.8–7.4)
NEUTROPHILS NFR BLD AUTO: 79.5 % — HIGH (ref 43–77)
NITRITE UR-MCNC: NEGATIVE — SIGNIFICANT CHANGE UP
PH UR: 6.5 — SIGNIFICANT CHANGE UP (ref 5–8)
PLATELET # BLD AUTO: 294 K/UL — SIGNIFICANT CHANGE UP (ref 150–400)
POTASSIUM SERPL-MCNC: 3.9 MMOL/L — SIGNIFICANT CHANGE UP (ref 3.5–5.3)
POTASSIUM SERPL-SCNC: 3.9 MMOL/L — SIGNIFICANT CHANGE UP (ref 3.5–5.3)
PROT SERPL-MCNC: 7.3 G/DL — SIGNIFICANT CHANGE UP (ref 6.6–8.7)
PROT UR-MCNC: 100 MG/DL
RBC # BLD: 4.86 M/UL — SIGNIFICANT CHANGE UP (ref 3.8–5.2)
RBC # FLD: 17.2 % — HIGH (ref 10.3–14.5)
RBC CASTS # UR COMP ASSIST: 230 /HPF — HIGH (ref 0–4)
SODIUM SERPL-SCNC: 141 MMOL/L — SIGNIFICANT CHANGE UP (ref 135–145)
SP GR SPEC: 1.02 — SIGNIFICANT CHANGE UP (ref 1–1.03)
SQUAMOUS # UR AUTO: 4 /HPF — SIGNIFICANT CHANGE UP (ref 0–5)
UROBILINOGEN FLD QL: 1 MG/DL — SIGNIFICANT CHANGE UP (ref 0.2–1)
WBC # BLD: 15.13 K/UL — HIGH (ref 3.8–10.5)
WBC # FLD AUTO: 15.13 K/UL — HIGH (ref 3.8–10.5)
WBC UR QL: >998 /HPF — HIGH (ref 0–5)

## 2024-09-25 PROCEDURE — 36415 COLL VENOUS BLD VENIPUNCTURE: CPT

## 2024-09-25 PROCEDURE — 87186 SC STD MICRODIL/AGAR DIL: CPT

## 2024-09-25 PROCEDURE — 80053 COMPREHEN METABOLIC PANEL: CPT

## 2024-09-25 PROCEDURE — 92012 INTRM OPH EXAM EST PATIENT: CPT | Performed by: OPHTHALMOLOGY

## 2024-09-25 PROCEDURE — 96374 THER/PROPH/DIAG INJ IV PUSH: CPT

## 2024-09-25 PROCEDURE — 99284 EMERGENCY DEPT VISIT MOD MDM: CPT | Mod: 25

## 2024-09-25 PROCEDURE — 85025 COMPLETE CBC W/AUTO DIFF WBC: CPT

## 2024-09-25 PROCEDURE — 99284 EMERGENCY DEPT VISIT MOD MDM: CPT

## 2024-09-25 PROCEDURE — 81001 URINALYSIS AUTO W/SCOPE: CPT

## 2024-09-25 PROCEDURE — 74176 CT ABD & PELVIS W/O CONTRAST: CPT | Mod: MC

## 2024-09-25 PROCEDURE — 74176 CT ABD & PELVIS W/O CONTRAST: CPT | Mod: 26,MC

## 2024-09-25 PROCEDURE — 87086 URINE CULTURE/COLONY COUNT: CPT

## 2024-09-25 PROCEDURE — 96375 TX/PRO/DX INJ NEW DRUG ADDON: CPT

## 2024-09-25 RX ORDER — PREDNISONE 10 MG
50 TABLET, DOSE PACK ORAL ONCE
Refills: 0 | Status: COMPLETED | OUTPATIENT
Start: 2024-09-25 | End: 2024-09-25

## 2024-09-25 RX ORDER — FAMOTIDINE 10 MG/ML
1 INJECTION INTRAVENOUS
Qty: 7 | Refills: 0
Start: 2024-09-25 | End: 2024-10-01

## 2024-09-25 RX ORDER — CEFPODOXIME PROXETIL 100 MG/5ML
1 GRANULE, FOR SUSPENSION ORAL
Qty: 20 | Refills: 0
Start: 2024-09-25 | End: 2024-10-04

## 2024-09-25 RX ORDER — DIPHENHYDRAMINE HCL 50 MG
25 CAPSULE ORAL ONCE
Refills: 0 | Status: COMPLETED | OUTPATIENT
Start: 2024-09-25 | End: 2024-09-25

## 2024-09-25 RX ORDER — ACETAMINOPHEN 325 MG/1
1000 TABLET ORAL ONCE
Refills: 0 | Status: COMPLETED | OUTPATIENT
Start: 2024-09-25 | End: 2024-09-25

## 2024-09-25 RX ORDER — SODIUM CHLORIDE 9 MG/ML
1000 INJECTION INTRAMUSCULAR; INTRAVENOUS; SUBCUTANEOUS ONCE
Refills: 0 | Status: COMPLETED | OUTPATIENT
Start: 2024-09-25 | End: 2024-09-25

## 2024-09-25 RX ORDER — FAMOTIDINE 10 MG/ML
20 INJECTION INTRAVENOUS ONCE
Refills: 0 | Status: COMPLETED | OUTPATIENT
Start: 2024-09-25 | End: 2024-09-25

## 2024-09-25 RX ADMIN — ACETAMINOPHEN 400 MILLIGRAM(S): 325 TABLET ORAL at 08:04

## 2024-09-25 RX ADMIN — Medication 1000 MILLIGRAM(S): at 09:00

## 2024-09-25 RX ADMIN — FAMOTIDINE 20 MILLIGRAM(S): 10 INJECTION INTRAVENOUS at 09:01

## 2024-09-25 RX ADMIN — Medication 50 MILLIGRAM(S): at 08:04

## 2024-09-25 RX ADMIN — SODIUM CHLORIDE 1000 MILLILITER(S): 9 INJECTION INTRAMUSCULAR; INTRAVENOUS; SUBCUTANEOUS at 08:08

## 2024-09-25 RX ADMIN — Medication 25 MILLIGRAM(S): at 08:04

## 2024-09-25 ASSESSMENT — CONFRONTATIONAL VISUAL FIELD TEST (CVF)
OD_FINDINGS: FULL
OS_FINDINGS: FULL

## 2024-09-25 NOTE — ED PROVIDER NOTE - PHYSICAL EXAMINATION
Gen: NAD, AOx3  Head: NCAT  HEENT: oral mucosa moist, normal conjunctiva, oropharynx clear without exudate or erythema  Lung: CTAB, no respiratory distress, no wheezing, rales, rhonchi  CV: normal s1/s2, rrr, no murmurs, Normal perfusion, pulses 2+ throughout  Abd: soft, NTND  MSK: No edema, no visible deformities, full range of motion in all 4 extremities  Neuro: CN II-XII grossly intact, No focal neurologic deficits  Skin: urticaria present over palms, arms, neck, face, chest  Psych: normal affect

## 2024-09-25 NOTE — ED PROVIDER NOTE - CLINICAL SUMMARY MEDICAL DECISION MAKING FREE TEXT BOX
patient with 2 complaints, 1.  Patient with dysuria, right flank pain rating to right groin, plan to check labs, CT abdomen pelvis to evaluate for renal stone, UA, pain control, reassess.  2.  Patient likely with allergic reaction, unclear if from medication versus food ingestion on Monday.  Will treat with Benadryl, prednisone, Pepcid, reassess.  No signs or symptoms of Fair[place]-Olman syndrome or TE N at this time, no mucosal lesions, no signs or symptoms of anaphylaxis. patient with 2 complaints, 1.  Patient with dysuria, right flank pain rating to right groin, plan to check labs, CT abdomen pelvis to evaluate for renal stone, UA, pain control, reassess.  2.  Patient likely with allergic reaction, unclear if from medication versus food ingestion on Monday.  Will treat with Benadryl, prednisone, Pepcid, reassess.  No signs or symptoms of [place]-Olman syndrome or TE N at this time, no mucosal lesions, no signs or symptoms of anaphylaxis.    Patient resting comfortably no acute distress at this time.  Labs reviewed.  UA positive for infection.  CT scan shows fullness of the right kidney likely secondary to infection.  Patient otherwise well-appearing, afebrile and states that she would like to go home.  Will treat for pyelonephritis with Vantin.  Patient also with history of GI bleed, will send home with prednisone x 2 more days.  Patient has been taken Benadryl with no relief of her rash/itchiness and therefore benefits outweigh the risks at this time when using prednisone to suppress allergic reaction.  I had a lengthy discussion with patient regarding these risks versus benefits and she agrees that she would like to take the prednisone.  Patient stable for discharge return precautions discussed.

## 2024-09-25 NOTE — ED ADULT NURSE NOTE - OBJECTIVE STATEMENT
Assumed care of pt at 0743 in . Pt A&Ox4 c/o hives on bilateral hairs, forehead, and arms. PT denies CP and SOB. RR even and unlabored.

## 2024-09-25 NOTE — ED PROVIDER NOTE - NSFOLLOWUPINSTRUCTIONS_ED_ALL_ED_FT
Rash    A rash is a change in the color of the skin. A rash can also change the way your skin feels. There are many different conditions and factors that can cause a rash, most of which are not dangerous. Make sure to follow up with your primary care physician or a dermatologist as instructed by your health care provider.    SEEK IMMEDIATE MEDICAL CARE IF YOU HAVE ANY OF THE FOLLOWING SYMPTOMS: fever, blisters, a rash inside your mouth, vaginal or anal pain, or altered mental status.     Pyelonephritis    Pyelonephritis is a kidney infection. In most cases, the infection clears up with treatment and does not cause further problems. More severe infections or chronic infections can sometimes spread to the bloodstream or lead to other problems with the kidneys. Symptoms include frequent or painful urination, abdominal pain, back pain, flank pain, fever/chills, nausea, or vomiting. If you were prescribed an antibiotic medicine, take it as told by your health care provider. Do not stop taking the antibiotic even if you start to feel better.    SEEK IMMEDIATE MEDICAL CARE IF YOU HAVE ANY OF THE FOLLOWING SYMPTOMS: inability to hold down antibiotics or fluids, worsening pain, dizziness/lightheadedness, or change in mental status.

## 2024-09-25 NOTE — ED ADULT TRIAGE NOTE - CHIEF COMPLAINT QUOTE
Patient presents to ED with c/o hives and both hands since Monday and urinary symptoms since last Friday.  Per patient, she was treated for UTI two weeks ago that resolved and now returned.  Last Tylenol 0430

## 2024-09-25 NOTE — ED PROVIDER NOTE - PATIENT PORTAL LINK FT
You can access the FollowMyHealth Patient Portal offered by [facility] by registering at the following website: [email or web address]. By joining [facility]’s FollowMyHealth portal, you will also be able to view your health information using other applications (apps) compatible with our system.

## 2024-09-25 NOTE — ED PROVIDER NOTE - ATTENDING APP SHARED VISIT CONTRIBUTION OF CARE
I, Kathi Collins, performed a face to face bedside interview with this patient regarding history of present illness, review of symptoms and relevant past medical, social and family history.  I completed an independent physical examination. Medical decision making, follow-up on ordered tests (ie labs, radiologic studies) and re-evaluation of the patient's status has been communicated to the ACP.  Disposition of the patient will be based on test outcome and response to ED interventions.

## 2024-09-25 NOTE — ED ADULT TRIAGE NOTE - WEIGHT IN LBS
71yo M w/pmhx htn (no longer on meds), dm2 (no longer on meds), and BPH as well as prostate cancer s/p prostatectomy now presenting with jaundice and abdominal pain x 1 month found to have pancreatic mass and biliary system dilatation as well as possible pancreatitis 171

## 2024-09-25 NOTE — ED PROVIDER NOTE - CARE PROVIDER_API CALL
William Bhatti J  Allergy and Immunology  [address] [place] [address]  Phone: [phone]  Fax: [phone]  Follow Up Time:

## 2024-09-25 NOTE — ED PROVIDER NOTE - OBJECTIVE STATEMENT
61-year-old female past medical history of hypertension, GI bleed in October 2023, presenting with dysuria and right flank pain radiating to right groin.  Patient states that she was diagnosed with a UTI 2 weeks ago, status post course of antibiotic (patient not sure what she took), which she finished 4 days ago.  2 days ago, patient noticed that she started to have itching to her palms and neck.  She states that it then spread to her face, chest, back.  States that the rash is "splotchy" and is extremely itchy.  Went to urgent care, was prescribed Benadryl.  Patient states that she did eat Chinese food on Monday.  No fevers or chills.  She is continuing to have dysuria and flank pain as well.  States that the rash is getting worse.  Denies mucosal involvement.  Denies shortness of breath, vomiting.

## 2024-09-26 RX ORDER — PREDNISONE 10 MG
2 TABLET, DOSE PACK ORAL
Qty: 4 | Refills: 0
Start: 2024-09-26 | End: 2024-09-27

## 2024-10-02 ENCOUNTER — NON-APPOINTMENT (OUTPATIENT)
Age: 69
End: 2024-10-02

## 2024-10-03 ENCOUNTER — APPOINTMENT (OUTPATIENT)
Dept: ULTRASOUND IMAGING | Facility: CLINIC | Age: 69
End: 2024-10-03

## 2024-10-05 ENCOUNTER — NON-APPOINTMENT (OUTPATIENT)
Age: 69
End: 2024-10-05

## 2024-10-17 ENCOUNTER — APPOINTMENT (OUTPATIENT)
Dept: RHEUMATOLOGY | Facility: CLINIC | Age: 69
End: 2024-10-17

## 2024-10-17 VITALS
BODY MASS INDEX: 26.84 KG/M2 | OXYGEN SATURATION: 98 % | HEART RATE: 72 BPM | DIASTOLIC BLOOD PRESSURE: 70 MMHG | HEIGHT: 66 IN | SYSTOLIC BLOOD PRESSURE: 140 MMHG | WEIGHT: 167 LBS | TEMPERATURE: 97.2 F

## 2024-10-17 DIAGNOSIS — R53.83 OTHER FATIGUE: ICD-10-CM

## 2024-10-17 DIAGNOSIS — M15.9 POLYOSTEOARTHRITIS, UNSPECIFIED: ICD-10-CM

## 2024-10-17 DIAGNOSIS — S22.080A WEDGE COMPRESSION FRACTURE OF T11-T12 VERTEBRA, INITIAL ENCOUNTER FOR CLOSED FRACTURE: ICD-10-CM

## 2024-10-17 DIAGNOSIS — H04.123 DRY EYE SYNDROME OF BILATERAL LACRIMAL GLANDS: ICD-10-CM

## 2024-10-17 DIAGNOSIS — M35.00 SICCA SYNDROME, UNSPECIFIED: ICD-10-CM

## 2024-10-17 DIAGNOSIS — M79.7 FIBROMYALGIA: ICD-10-CM

## 2024-10-17 DIAGNOSIS — N39.0 URINARY TRACT INFECTION, SITE NOT SPECIFIED: ICD-10-CM

## 2024-10-17 DIAGNOSIS — H26.9 UNSPECIFIED CATARACT: ICD-10-CM

## 2024-10-17 DIAGNOSIS — K44.9 DIAPHRAGMATIC HERNIA W/OUT OBSTRUCTION OR GANGRENE: ICD-10-CM

## 2024-10-17 DIAGNOSIS — M81.0 AGE-RELATED OSTEOPOROSIS W/OUT CURRENT PATHOLOGICAL FRACTURE: ICD-10-CM

## 2024-10-17 DIAGNOSIS — Z87.09 PERSONAL HISTORY OF OTHER DISEASES OF THE RESPIRATORY SYSTEM: ICD-10-CM

## 2024-10-17 PROCEDURE — 99215 OFFICE O/P EST HI 40 MIN: CPT

## 2024-10-17 PROCEDURE — G2212 PROLONG OUTPT/OFFICE VIS: CPT

## 2024-10-17 PROCEDURE — G2211 COMPLEX E/M VISIT ADD ON: CPT

## 2024-10-22 RX ORDER — AMOXICILLIN 500 MG/1
500 TABLET, FILM COATED ORAL
Qty: 4 | Refills: 2 | Status: ACTIVE | COMMUNITY
Start: 2024-10-22 | End: 1900-01-01

## 2024-10-25 PROBLEM — Z87.09 HISTORY OF ACUTE BRONCHITIS: Status: RESOLVED | Noted: 2024-10-25 | Resolved: 2024-10-25

## 2024-10-25 PROBLEM — H26.9 CATARACT OF BOTH EYES, UNSPECIFIED CATARACT TYPE: Status: RESOLVED | Noted: 2024-10-25 | Resolved: 2024-10-25

## 2024-10-25 PROBLEM — N39.0 ACUTE UTI: Status: RESOLVED | Noted: 2024-10-25 | Resolved: 2024-10-25

## 2024-10-25 LAB
ALBUMIN MFR SERPL ELPH: 58.7 %
ALBUMIN SERPL ELPH-MCNC: 4.4 G/DL
ALBUMIN SERPL-MCNC: 4.2 G/DL
ALBUMIN/GLOB SERPL: 1.4 RATIO
ALP BLD-CCNC: 114 U/L
ALPHA1 GLOB MFR SERPL ELPH: 4.5 %
ALPHA1 GLOB SERPL ELPH-MCNC: 0.3 G/DL
ALPHA2 GLOB MFR SERPL ELPH: 11.3 %
ALPHA2 GLOB SERPL ELPH-MCNC: 0.8 G/DL
ALT SERPL-CCNC: 24 U/L
ANA SER IF-ACNC: NEGATIVE
ANION GAP SERPL CALC-SCNC: 15 MMOL/L
APPEARANCE: CLEAR
AST SERPL-CCNC: 20 U/L
B-GLOBULIN MFR SERPL ELPH: 11.3 %
B-GLOBULIN SERPL ELPH-MCNC: 0.8 G/DL
BACTERIA: ABNORMAL /HPF
BASOPHILS # BLD AUTO: 0.1 K/UL
BASOPHILS NFR BLD AUTO: 0.7 %
BILIRUB SERPL-MCNC: 0.4 MG/DL
BILIRUBIN URINE: NEGATIVE
BLOOD URINE: NEGATIVE
BUN SERPL-MCNC: 14 MG/DL
CALCIUM SERPL-MCNC: 10 MG/DL
CAST: 0 /LPF
CHLORIDE SERPL-SCNC: 100 MMOL/L
CK SERPL-CCNC: 50 U/L
CO2 SERPL-SCNC: 23 MMOL/L
COLOR: YELLOW
CREAT SERPL-MCNC: 0.64 MG/DL
CRP SERPL-MCNC: 3 MG/L
DEPRECATED KAPPA LC FREE/LAMBDA SER: 0.88 RATIO
EGFR: 96 ML/MIN/1.73M2
ENA SS-A AB SER IA-ACNC: <0.2 AL
ENA SS-B AB SER IA-ACNC: <0.2 AL
EOSINOPHIL # BLD AUTO: 0.54 K/UL
EOSINOPHIL NFR BLD AUTO: 3.8 %
EPITHELIAL CELLS: 1 /HPF
ERYTHROCYTE [SEDIMENTATION RATE] IN BLOOD BY WESTERGREN METHOD: 24 MM/HR
FERRITIN SERPL-MCNC: 28 NG/ML
GAMMA GLOB FLD ELPH-MCNC: 1 G/DL
GAMMA GLOB MFR SERPL ELPH: 14.2 %
GLUCOSE QUALITATIVE U: NEGATIVE MG/DL
GLUCOSE SERPL-MCNC: 111 MG/DL
HCT VFR BLD CALC: 42.9 %
HGB BLD-MCNC: 13.6 G/DL
IGA SER QL IEP: 138 MG/DL
IGG SER QL IEP: 997 MG/DL
IGM SER QL IEP: 84 MG/DL
IMM GRANULOCYTES NFR BLD AUTO: 1.5 %
INTERPRETATION SERPL IEP-IMP: NORMAL
IRON SATN MFR SERPL: 21 %
IRON SERPL-MCNC: 69 UG/DL
KAPPA LC CSF-MCNC: 1.38 MG/DL
KAPPA LC SERPL-MCNC: 1.21 MG/DL
KETONES URINE: NEGATIVE MG/DL
LDH SERPL-CCNC: 176 U/L
LEUKOCYTE ESTERASE URINE: ABNORMAL
LYMPHOCYTES # BLD AUTO: 2.18 K/UL
LYMPHOCYTES NFR BLD AUTO: 15.5 %
M PROTEIN SPEC IFE-MCNC: NORMAL
MAGNESIUM SERPL-MCNC: 2.3 MG/DL
MAN DIFF?: NORMAL
MCHC RBC-ENTMCNC: 27 PG
MCHC RBC-ENTMCNC: 31.7 GM/DL
MCV RBC AUTO: 85.1 FL
MICROSCOPIC-UA: NORMAL
MONOCYTES # BLD AUTO: 1 K/UL
MONOCYTES NFR BLD AUTO: 7.1 %
NEUTROPHILS # BLD AUTO: 10.03 K/UL
NEUTROPHILS NFR BLD AUTO: 71.4 %
NITRITE URINE: NEGATIVE
PH URINE: 6.5
PHOSPHATE SERPL-MCNC: 4.1 MG/DL
PLATELET # BLD AUTO: 345 K/UL
POTASSIUM SERPL-SCNC: 4.5 MMOL/L
PROT SERPL-MCNC: 7.1 G/DL
PROTEIN URINE: NEGATIVE MG/DL
RBC # BLD: 5.04 M/UL
RBC # FLD: 17.3 %
RED BLOOD CELLS URINE: 0 /HPF
RHEUMATOID FACT SER QL: <10 IU/ML
SODIUM SERPL-SCNC: 138 MMOL/L
SPECIFIC GRAVITY URINE: 1.01
TIBC SERPL-MCNC: 324 UG/DL
UIBC SERPL-MCNC: 255 UG/DL
UROBILINOGEN URINE: 0.2 MG/DL
WBC # FLD AUTO: 14.06 K/UL
WHITE BLOOD CELLS URINE: 7 /HPF

## 2024-11-08 ENCOUNTER — APPOINTMENT (OUTPATIENT)
Dept: DERMATOLOGY | Facility: CLINIC | Age: 69
End: 2024-11-08
Payer: MEDICARE

## 2024-11-08 ENCOUNTER — RESULT REVIEW (OUTPATIENT)
Age: 69
End: 2024-11-08

## 2024-11-08 PROCEDURE — 11103 TANGNTL BX SKIN EA SEP/ADDL: CPT

## 2024-11-08 PROCEDURE — 99203 OFFICE O/P NEW LOW 30 MIN: CPT | Mod: 25

## 2024-11-08 PROCEDURE — 11102 TANGNTL BX SKIN SINGLE LES: CPT

## 2024-11-13 ENCOUNTER — ASC (OUTPATIENT)
Dept: URBAN - METROPOLITAN AREA SURGERY 8 | Facility: SURGERY | Age: 69
Setting detail: OPHTHALMOLOGY
End: 2024-11-13
Payer: MEDICARE

## 2024-11-13 DIAGNOSIS — H52.211: ICD-10-CM

## 2024-11-13 DIAGNOSIS — H25.11: ICD-10-CM

## 2024-11-13 PROCEDURE — FEMTO FEMTOSECOND LASER: Mod: GY | Performed by: OPHTHALMOLOGY

## 2024-11-13 PROCEDURE — 66984 XCAPSL CTRC RMVL W/O ECP: CPT | Mod: RT | Performed by: OPHTHALMOLOGY

## 2024-11-14 ENCOUNTER — OFFICE (OUTPATIENT)
Dept: URBAN - METROPOLITAN AREA CLINIC 112 | Facility: CLINIC | Age: 69
Setting detail: OPHTHALMOLOGY
End: 2024-11-14
Payer: MEDICARE

## 2024-11-14 ENCOUNTER — RX ONLY (RX ONLY)
Age: 69
End: 2024-11-14

## 2024-11-14 DIAGNOSIS — Z96.1: ICD-10-CM

## 2024-11-14 PROCEDURE — 99024 POSTOP FOLLOW-UP VISIT: CPT | Performed by: OPTOMETRIST

## 2024-11-14 ASSESSMENT — REFRACTION_CURRENTRX
OS_AXIS: 111
OD_SPHERE: +3.00
OD_CYLINDER: -1.50
OS_OVR_VA: 20/
OD_VPRISM_DIRECTION: PROGS
OS_VPRISM_DIRECTION: PROGS
OD_ADD: +2.25
OS_CYLINDER: -0.25
OD_CYLINDER: -0.50
OS_AXIS: 124
OD_AXIS: 081
OD_VPRISM_DIRECTION: PROGS
OS_SPHERE: +2.75
OS_SPHERE: +2.00
OS_ADD: +2.50
OS_OVR_VA: 20/
OD_SPHERE: +3.00
OS_ADD: +2.25
OD_ADD: +2.50
OD_AXIS: 078
OS_CYLINDER: +1.00
OD_OVR_VA: 20/
OS_VPRISM_DIRECTION: PROGS
OD_OVR_VA: 20/

## 2024-11-14 ASSESSMENT — REFRACTION_MANIFEST
OD_CYLINDER: -0.50
OS_SPHERE: +2.50
OD_SPHERE: +2.75
OS_VA1: 20/40
OS_AXIS: 170
OS_CYLINDER: -1.25
OD_VA1: 20/40
OD_AXIS: 001

## 2024-11-14 ASSESSMENT — REFRACTION_AUTOREFRACTION
OD_SPHERE: -0.25
OS_AXIS: 147
OD_CYLINDER: -1.25
OS_SPHERE: +2.50
OS_CYLINDER: -0.25
OD_AXIS: 092

## 2024-11-14 ASSESSMENT — KERATOMETRY
OS_K1POWER_DIOPTERS: 45.00
OD_K2POWER_DIOPTERS: 46.00
OS_K2POWER_DIOPTERS: 45.25
OD_AXISANGLE_DEGREES: 029
OS_AXISANGLE_DEGREES: 070
OD_K1POWER_DIOPTERS: 45.50

## 2024-11-14 ASSESSMENT — TONOMETRY
OD_IOP_MMHG: 17
OS_IOP_MMHG: 18

## 2024-11-14 ASSESSMENT — CONFRONTATIONAL VISUAL FIELD TEST (CVF)
OS_FINDINGS: FULL
OD_FINDINGS: FULL

## 2024-11-14 ASSESSMENT — CORNEAL EDEMA CLINICAL DESCRIPTION: OD_CORNEALEDEMA: 2+

## 2024-11-14 ASSESSMENT — VISUAL ACUITY
OS_BCVA: 20/30
OD_BCVA: 20/20-2

## 2024-11-20 ENCOUNTER — APPOINTMENT (OUTPATIENT)
Dept: DERMATOLOGY | Facility: CLINIC | Age: 69
End: 2024-11-20
Payer: MEDICARE

## 2024-11-20 PROCEDURE — 99213 OFFICE O/P EST LOW 20 MIN: CPT

## 2024-11-25 ENCOUNTER — OFFICE (OUTPATIENT)
Dept: URBAN - METROPOLITAN AREA CLINIC 116 | Facility: CLINIC | Age: 69
Setting detail: OPHTHALMOLOGY
End: 2024-11-25
Payer: MEDICARE

## 2024-11-25 DIAGNOSIS — H25.12: ICD-10-CM

## 2024-11-25 DIAGNOSIS — Z96.1: ICD-10-CM

## 2024-11-25 PROCEDURE — 92136 OPHTHALMIC BIOMETRY: CPT | Mod: 24,LT | Performed by: OPTOMETRIST

## 2024-11-25 PROCEDURE — 99024 POSTOP FOLLOW-UP VISIT: CPT | Performed by: OPTOMETRIST

## 2024-11-25 ASSESSMENT — REFRACTION_AUTOREFRACTION
OS_AXIS: 164
OD_CYLINDER: -0.50
OS_SPHERE: -1.25
OS_CYLINDER: -0.75
OD_SPHERE: 0.00
OD_AXIS: 084

## 2024-11-25 ASSESSMENT — KERATOMETRY
OS_K2POWER_DIOPTERS: 48.75
OS_K1POWER_DIOPTERS: 48.00
OD_AXISANGLE_DEGREES: 090
OD_K1POWER_DIOPTERS: 45.00
OS_AXISANGLE_DEGREES: 078
OD_K2POWER_DIOPTERS: 45.00

## 2024-11-25 ASSESSMENT — REFRACTION_CURRENTRX
OD_SPHERE: +3.00
OD_VPRISM_DIRECTION: PROGS
OD_CYLINDER: -1.50
OS_SPHERE: +2.00
OD_SPHERE: +3.00
OD_OVR_VA: 20/
OS_VPRISM_DIRECTION: PROGS
OD_AXIS: 078
OD_ADD: +2.50
OS_AXIS: 124
OS_OVR_VA: 20/
OS_ADD: +2.25
OD_ADD: +2.25
OS_ADD: +2.50
OS_OVR_VA: 20/
OS_SPHERE: +2.75
OD_VPRISM_DIRECTION: PROGS
OS_AXIS: 111
OD_OVR_VA: 20/
OD_AXIS: 081
OS_CYLINDER: +1.00
OS_CYLINDER: -0.25
OS_VPRISM_DIRECTION: PROGS
OD_CYLINDER: -0.50

## 2024-11-25 ASSESSMENT — VISUAL ACUITY
OD_BCVA: 20/20-2
OS_BCVA: 20/25-2

## 2024-11-25 ASSESSMENT — REFRACTION_MANIFEST
OS_VA1: 20/40
OD_CYLINDER: -0.50
OD_CYLINDER: -0.50
OD_AXIS: 001
OD_SPHERE: PLANO
OD_VA1: 20/25
OS_AXIS: 170
OS_SPHERE: +2.50
OS_CYLINDER: -1.25
OD_AXIS: 080
OD_VA1: 20/40
OD_SPHERE: +2.75

## 2024-11-25 ASSESSMENT — CONFRONTATIONAL VISUAL FIELD TEST (CVF)
OS_FINDINGS: FULL
OD_FINDINGS: FULL

## 2024-11-25 ASSESSMENT — CORNEAL EDEMA CLINICAL DESCRIPTION: OD_CORNEALEDEMA: 2+

## 2024-11-25 ASSESSMENT — TONOMETRY: OD_IOP_MMHG: 17

## 2024-12-04 ENCOUNTER — ASC (OUTPATIENT)
Dept: URBAN - METROPOLITAN AREA SURGERY 8 | Facility: SURGERY | Age: 69
Setting detail: OPHTHALMOLOGY
End: 2024-12-04
Payer: MEDICARE

## 2024-12-04 DIAGNOSIS — H52.212: ICD-10-CM

## 2024-12-04 DIAGNOSIS — H25.12: ICD-10-CM

## 2024-12-04 PROCEDURE — 66984 XCAPSL CTRC RMVL W/O ECP: CPT | Mod: 79,LT | Performed by: OPHTHALMOLOGY

## 2024-12-04 PROCEDURE — FEMTO FEMTOSECOND LASER: Mod: GY | Performed by: OPHTHALMOLOGY

## 2024-12-05 ENCOUNTER — OFFICE (OUTPATIENT)
Dept: URBAN - METROPOLITAN AREA CLINIC 94 | Facility: CLINIC | Age: 69
Setting detail: OPHTHALMOLOGY
End: 2024-12-05
Payer: MEDICARE

## 2024-12-05 ENCOUNTER — APPOINTMENT (OUTPATIENT)
Dept: ENDOCRINOLOGY | Facility: CLINIC | Age: 69
End: 2024-12-05

## 2024-12-05 DIAGNOSIS — Z96.1: ICD-10-CM

## 2024-12-05 PROCEDURE — 99024 POSTOP FOLLOW-UP VISIT: CPT | Performed by: OPTOMETRIST

## 2024-12-05 ASSESSMENT — KERATOMETRY
OS_K2POWER_DIOPTERS: 46.25
OD_K1POWER_DIOPTERS: 44.75
OD_AXISANGLE_DEGREES: 125
OD_K2POWER_DIOPTERS: 45.00
OS_AXISANGLE_DEGREES: 105
OS_K1POWER_DIOPTERS: 45.25

## 2024-12-05 ASSESSMENT — REFRACTION_MANIFEST
OS_CYLINDER: -1.25
OD_SPHERE: +2.75
OD_CYLINDER: -0.50
OS_AXIS: 170
OD_AXIS: 080
OS_SPHERE: +2.50
OD_SPHERE: PLANO
OD_AXIS: 001
OD_CYLINDER: -0.50
OD_VA1: 20/25
OS_VA1: 20/40
OD_VA1: 20/40

## 2024-12-05 ASSESSMENT — REFRACTION_CURRENTRX
OD_SPHERE: +3.00
OS_ADD: +2.25
OS_SPHERE: +2.75
OD_SPHERE: +3.00
OS_ADD: +2.50
OS_AXIS: 124
OS_CYLINDER: +1.00
OD_AXIS: 081
OD_VPRISM_DIRECTION: PROGS
OS_VPRISM_DIRECTION: PROGS
OS_CYLINDER: -0.25
OS_OVR_VA: 20/
OD_OVR_VA: 20/
OS_AXIS: 111
OD_VPRISM_DIRECTION: PROGS
OS_VPRISM_DIRECTION: PROGS
OD_CYLINDER: -1.50
OS_SPHERE: +2.00
OD_OVR_VA: 20/
OD_ADD: +2.50
OS_OVR_VA: 20/
OD_CYLINDER: -0.50
OD_ADD: +2.25
OD_AXIS: 078

## 2024-12-05 ASSESSMENT — TONOMETRY
OS_IOP_MMHG: 19
OD_IOP_MMHG: 17

## 2024-12-05 ASSESSMENT — VISUAL ACUITY
OD_BCVA: 20/30
OS_BCVA: 20/25

## 2024-12-05 ASSESSMENT — REFRACTION_AUTOREFRACTION
OD_CYLINDER: -0.50
OS_SPHERE: -1.00
OD_SPHERE: 0.00
OD_AXIS: 100
OS_CYLINDER: -0.75
OS_AXIS: 035

## 2024-12-05 ASSESSMENT — CONFRONTATIONAL VISUAL FIELD TEST (CVF)
OS_FINDINGS: FULL
OD_FINDINGS: FULL

## 2024-12-05 ASSESSMENT — CORNEAL EDEMA CLINICAL DESCRIPTION
OD_CORNEALEDEMA: 2+
OS_CORNEALEDEMA: 1+

## 2024-12-05 ASSESSMENT — CORNEAL EDEMA - FOLDS/STRIAE: OS_FOLDSSTRIAE: 2+

## 2024-12-16 ENCOUNTER — NON-APPOINTMENT (OUTPATIENT)
Age: 69
End: 2024-12-16

## 2024-12-16 ENCOUNTER — APPOINTMENT (OUTPATIENT)
Dept: CARDIOLOGY | Facility: CLINIC | Age: 69
End: 2024-12-16
Payer: MEDICARE

## 2024-12-16 VITALS
BODY MASS INDEX: 28.45 KG/M2 | SYSTOLIC BLOOD PRESSURE: 190 MMHG | HEIGHT: 66 IN | WEIGHT: 177 LBS | OXYGEN SATURATION: 97 % | DIASTOLIC BLOOD PRESSURE: 80 MMHG | HEART RATE: 62 BPM

## 2024-12-16 VITALS — SYSTOLIC BLOOD PRESSURE: 170 MMHG | DIASTOLIC BLOOD PRESSURE: 70 MMHG

## 2024-12-16 DIAGNOSIS — I10 ESSENTIAL (PRIMARY) HYPERTENSION: ICD-10-CM

## 2024-12-16 DIAGNOSIS — E78.2 MIXED HYPERLIPIDEMIA: ICD-10-CM

## 2024-12-16 PROCEDURE — 99214 OFFICE O/P EST MOD 30 MIN: CPT | Mod: 25

## 2024-12-16 PROCEDURE — 93000 ELECTROCARDIOGRAM COMPLETE: CPT

## 2024-12-16 RX ORDER — HYDROCHLOROTHIAZIDE 25 MG/1
25 TABLET ORAL
Qty: 90 | Refills: 0 | Status: ACTIVE | COMMUNITY
Start: 2024-12-16 | End: 1900-01-01

## 2024-12-17 ENCOUNTER — OFFICE (OUTPATIENT)
Dept: URBAN - METROPOLITAN AREA CLINIC 116 | Facility: CLINIC | Age: 69
Setting detail: OPHTHALMOLOGY
End: 2024-12-17
Payer: MEDICARE

## 2024-12-17 DIAGNOSIS — Z96.1: ICD-10-CM

## 2024-12-17 PROCEDURE — 99024 POSTOP FOLLOW-UP VISIT: CPT | Performed by: PHYSICIAN ASSISTANT

## 2024-12-17 ASSESSMENT — REFRACTION_CURRENTRX
OS_SPHERE: +2.00
OD_VPRISM_DIRECTION: PROGS
OS_VPRISM_DIRECTION: PROGS
OS_CYLINDER: -0.25
OS_ADD: +2.25
OS_VPRISM_DIRECTION: PROGS
OD_VPRISM_DIRECTION: PROGS
OD_OVR_VA: 20/
OD_OVR_VA: 20/
OD_ADD: +2.50
OS_SPHERE: +2.75
OD_SPHERE: +3.00
OD_ADD: +2.25
OD_AXIS: 081
OD_SPHERE: +3.00
OS_OVR_VA: 20/
OS_AXIS: 124
OD_CYLINDER: -0.50
OS_CYLINDER: +1.00
OD_AXIS: 078
OS_OVR_VA: 20/
OD_CYLINDER: -1.50
OS_AXIS: 111
OS_ADD: +2.50

## 2024-12-17 ASSESSMENT — REFRACTION_MANIFEST
OD_SPHERE: +2.75
OD_CYLINDER: -0.50
OD_VA1: 20/25
OD_SPHERE: PLANO
OS_VA1: 20/40
OS_SPHERE: +2.50
OD_AXIS: 080
OS_AXIS: 170
OD_AXIS: 001
OD_CYLINDER: -0.50
OS_CYLINDER: -1.25
OD_VA1: 20/40

## 2024-12-17 ASSESSMENT — KERATOMETRY
OS_AXISANGLE_DEGREES: 090
OS_K2POWER_DIOPTERS: 47.25
OD_K2POWER_DIOPTERS: 47.25
OD_K1POWER_DIOPTERS: 45.00
OS_K1POWER_DIOPTERS: 45.00
OD_AXISANGLE_DEGREES: 090

## 2024-12-17 ASSESSMENT — CONFRONTATIONAL VISUAL FIELD TEST (CVF)
OS_FINDINGS: FULL
OD_FINDINGS: FULL

## 2024-12-17 ASSESSMENT — REFRACTION_AUTOREFRACTION
OS_SPHERE: -1.00
OD_SPHERE: -0.25
OS_AXIS: 005
OD_CYLINDER: -1.25
OS_CYLINDER: -1.50
OD_AXIS: 180

## 2024-12-17 ASSESSMENT — VISUAL ACUITY
OS_BCVA: 20/20-1
OD_BCVA: 20/20+1

## 2024-12-17 ASSESSMENT — TONOMETRY
OD_IOP_MMHG: 12
OS_IOP_MMHG: 12

## 2024-12-26 RX ORDER — AMOXICILLIN 500 MG/1
500 CAPSULE ORAL
Qty: 4 | Refills: 2 | Status: ACTIVE | COMMUNITY
Start: 2024-12-26 | End: 1900-01-01

## 2024-12-30 ENCOUNTER — APPOINTMENT (OUTPATIENT)
Dept: DERMATOLOGY | Facility: CLINIC | Age: 69
End: 2024-12-30
Payer: MEDICARE

## 2024-12-30 ENCOUNTER — NON-APPOINTMENT (OUTPATIENT)
Age: 69
End: 2024-12-30

## 2024-12-30 DIAGNOSIS — C44.722 SQUAMOUS CELL CARCINOMA OF SKIN OF RIGHT LOWER LIMB, INCLUDING HIP: ICD-10-CM

## 2024-12-30 PROCEDURE — 12032 INTMD RPR S/A/T/EXT 2.6-7.5: CPT

## 2024-12-30 PROCEDURE — 17314 MOHS ADDL STAGE T/A/L: CPT

## 2024-12-30 PROCEDURE — 17313 MOHS 1 STAGE T/A/L: CPT

## 2024-12-30 RX ORDER — MUPIROCIN 20 MG/G
2 OINTMENT TOPICAL
Qty: 1 | Refills: 1 | Status: ACTIVE | COMMUNITY
Start: 2024-12-30 | End: 1900-01-01

## 2024-12-30 RX ORDER — CEPHALEXIN 500 MG/1
500 CAPSULE ORAL 3 TIMES DAILY
Qty: 21 | Refills: 0 | Status: ACTIVE | COMMUNITY
Start: 2024-12-30 | End: 1900-01-01

## 2025-01-21 ENCOUNTER — APPOINTMENT (OUTPATIENT)
Dept: DERMATOLOGY | Facility: CLINIC | Age: 70
End: 2025-01-21
Payer: MEDICARE

## 2025-01-21 ENCOUNTER — APPOINTMENT (OUTPATIENT)
Dept: CARDIOLOGY | Facility: CLINIC | Age: 70
End: 2025-01-21
Payer: MEDICARE

## 2025-01-21 VITALS
HEIGHT: 66 IN | DIASTOLIC BLOOD PRESSURE: 74 MMHG | OXYGEN SATURATION: 99 % | WEIGHT: 173 LBS | SYSTOLIC BLOOD PRESSURE: 154 MMHG | HEART RATE: 70 BPM | BODY MASS INDEX: 27.8 KG/M2

## 2025-01-21 DIAGNOSIS — I10 ESSENTIAL (PRIMARY) HYPERTENSION: ICD-10-CM

## 2025-01-21 DIAGNOSIS — C44.722 SQUAMOUS CELL CARCINOMA OF SKIN OF RIGHT LOWER LIMB, INCLUDING HIP: ICD-10-CM

## 2025-01-21 PROCEDURE — 99214 OFFICE O/P EST MOD 30 MIN: CPT

## 2025-01-21 PROCEDURE — 99212 OFFICE O/P EST SF 10 MIN: CPT

## 2025-01-23 ENCOUNTER — OFFICE (OUTPATIENT)
Dept: URBAN - METROPOLITAN AREA CLINIC 115 | Facility: CLINIC | Age: 70
Setting detail: OPHTHALMOLOGY
End: 2025-01-23
Payer: MEDICARE

## 2025-01-23 DIAGNOSIS — Z96.1: ICD-10-CM

## 2025-01-23 PROCEDURE — 99024 POSTOP FOLLOW-UP VISIT: CPT | Performed by: OPHTHALMOLOGY

## 2025-01-23 ASSESSMENT — KERATOMETRY
OD_AXISANGLE_DEGREES: 090
OS_K1POWER_DIOPTERS: 45.00
OS_K2POWER_DIOPTERS: 47.25
OS_AXISANGLE_DEGREES: 090
OD_K1POWER_DIOPTERS: 45.00
OD_K2POWER_DIOPTERS: 47.25

## 2025-01-23 ASSESSMENT — REFRACTION_CURRENTRX
OD_SPHERE: +3.00
OS_ADD: +2.25
OD_AXIS: 081
OD_VPRISM_DIRECTION: PROGS
OS_ADD: +2.50
OS_CYLINDER: -0.25
OD_AXIS: 078
OS_SPHERE: +2.75
OD_SPHERE: +3.00
OD_VPRISM_DIRECTION: PROGS
OD_CYLINDER: -0.50
OS_CYLINDER: +1.00
OS_OVR_VA: 20/
OD_OVR_VA: 20/
OS_AXIS: 124
OS_VPRISM_DIRECTION: PROGS
OS_SPHERE: +2.00
OS_OVR_VA: 20/
OD_ADD: +2.50
OD_ADD: +2.25
OD_OVR_VA: 20/
OD_CYLINDER: -1.50
OS_AXIS: 111
OS_VPRISM_DIRECTION: PROGS

## 2025-01-23 ASSESSMENT — VISUAL ACUITY
OS_BCVA: 20/25
OD_BCVA: 20/25

## 2025-01-23 ASSESSMENT — REFRACTION_MANIFEST
OD_VA1: 20/25
OD_SPHERE: PLANO
OS_CYLINDER: -1.25
OD_VA1: 20/40
OD_AXIS: 080
OD_CYLINDER: -0.50
OD_AXIS: 001
OS_VA1: 20/40
OD_CYLINDER: -0.50
OS_AXIS: 170
OS_SPHERE: +2.50
OD_SPHERE: +2.75

## 2025-01-23 ASSESSMENT — REFRACTION_AUTOREFRACTION
OS_CYLINDER: -1.75
OD_CYLINDER: -0.51
OS_SPHERE: +0.50
OS_AXIS: 006
OD_SPHERE: -0.25

## 2025-01-23 ASSESSMENT — CONFRONTATIONAL VISUAL FIELD TEST (CVF)
OD_FINDINGS: FULL
OS_FINDINGS: FULL

## 2025-01-23 ASSESSMENT — TONOMETRY
OD_IOP_MMHG: 14
OS_IOP_MMHG: 17

## 2025-02-03 NOTE — ED ADULT NURSE NOTE - SUICIDE SCREENING QUESTION 2
Clindamcyin three times a day for 7 days to treat infection. Warm/hot soaks with epson salt as often as you can to keep open and draining. Ibuprofen for pain if needed. Follow up with obgyn for further evaluation and management    No

## 2025-02-05 ENCOUNTER — RX RENEWAL (OUTPATIENT)
Age: 70
End: 2025-02-05

## 2025-02-11 ENCOUNTER — APPOINTMENT (OUTPATIENT)
Dept: DERMATOLOGY | Facility: CLINIC | Age: 70
End: 2025-02-11

## 2025-02-12 NOTE — H&P ADULT - CONVERSATION DETAILS
.no   labs , imaging discussed with patient   compressions, intubation discussed with patient   patient wishes to be full code

## 2025-02-20 NOTE — CONSULT NOTE ADULT - PROBLEM SELECTOR PROBLEM 2
Send us copy of bill for the Botox injection so we can see what the cost was  Referral to [facility] [address], [place] [address]   HTN, goal below 130/80

## 2025-02-27 ENCOUNTER — NON-APPOINTMENT (OUTPATIENT)
Age: 70
End: 2025-02-27

## 2025-02-27 ENCOUNTER — APPOINTMENT (OUTPATIENT)
Dept: RHEUMATOLOGY | Facility: CLINIC | Age: 70
End: 2025-02-27
Payer: MEDICARE

## 2025-02-27 VITALS
OXYGEN SATURATION: 97 % | HEART RATE: 69 BPM | DIASTOLIC BLOOD PRESSURE: 70 MMHG | SYSTOLIC BLOOD PRESSURE: 136 MMHG | HEIGHT: 66 IN | TEMPERATURE: 98.4 F

## 2025-02-27 DIAGNOSIS — S22.080A WEDGE COMPRESSION FRACTURE OF T11-T12 VERTEBRA, INITIAL ENCOUNTER FOR CLOSED FRACTURE: ICD-10-CM

## 2025-02-27 DIAGNOSIS — M79.7 FIBROMYALGIA: ICD-10-CM

## 2025-02-27 DIAGNOSIS — M15.9 POLYOSTEOARTHRITIS, UNSPECIFIED: ICD-10-CM

## 2025-02-27 DIAGNOSIS — R53.83 OTHER FATIGUE: ICD-10-CM

## 2025-02-27 DIAGNOSIS — R68.2 DRY MOUTH, UNSPECIFIED: ICD-10-CM

## 2025-02-27 DIAGNOSIS — M54.41 LUMBAGO WITH SCIATICA, RIGHT SIDE: ICD-10-CM

## 2025-02-27 DIAGNOSIS — M81.0 AGE-RELATED OSTEOPOROSIS W/OUT CURRENT PATHOLOGICAL FRACTURE: ICD-10-CM

## 2025-02-27 DIAGNOSIS — G89.29 LUMBAGO WITH SCIATICA, RIGHT SIDE: ICD-10-CM

## 2025-02-27 DIAGNOSIS — H04.123 DRY EYE SYNDROME OF BILATERAL LACRIMAL GLANDS: ICD-10-CM

## 2025-02-27 DIAGNOSIS — M35.00 SICCA SYNDROME, UNSPECIFIED: ICD-10-CM

## 2025-02-27 PROCEDURE — 99215 OFFICE O/P EST HI 40 MIN: CPT

## 2025-02-27 PROCEDURE — G2211 COMPLEX E/M VISIT ADD ON: CPT

## 2025-02-27 PROCEDURE — G2212 PROLONG OUTPT/OFFICE VIS: CPT

## 2025-03-02 RX ORDER — TIZANIDINE 2 MG/1
2 TABLET ORAL
Qty: 360 | Refills: 1 | Status: ACTIVE | COMMUNITY
Start: 2025-03-02 | End: 1900-01-01

## 2025-04-02 ENCOUNTER — APPOINTMENT (OUTPATIENT)
Dept: PULMONOLOGY | Facility: CLINIC | Age: 70
End: 2025-04-02
Payer: MEDICARE

## 2025-04-02 VITALS
HEIGHT: 65 IN | RESPIRATION RATE: 16 BRPM | DIASTOLIC BLOOD PRESSURE: 80 MMHG | OXYGEN SATURATION: 98 % | SYSTOLIC BLOOD PRESSURE: 126 MMHG | BODY MASS INDEX: 28.99 KG/M2 | HEART RATE: 76 BPM | WEIGHT: 174 LBS

## 2025-04-02 DIAGNOSIS — R91.8 OTHER NONSPECIFIC ABNORMAL FINDING OF LUNG FIELD: ICD-10-CM

## 2025-04-02 DIAGNOSIS — Z87.891 PERSONAL HISTORY OF NICOTINE DEPENDENCE: ICD-10-CM

## 2025-04-02 DIAGNOSIS — R06.02 SHORTNESS OF BREATH: ICD-10-CM

## 2025-04-02 PROCEDURE — 99204 OFFICE O/P NEW MOD 45 MIN: CPT

## 2025-04-02 PROCEDURE — G2211 COMPLEX E/M VISIT ADD ON: CPT

## 2025-04-24 ENCOUNTER — OFFICE (OUTPATIENT)
Dept: URBAN - METROPOLITAN AREA CLINIC 115 | Facility: CLINIC | Age: 70
Setting detail: OPHTHALMOLOGY
End: 2025-04-24
Payer: MEDICARE

## 2025-04-24 DIAGNOSIS — H04.123: ICD-10-CM

## 2025-04-24 DIAGNOSIS — H35.033: ICD-10-CM

## 2025-04-24 PROCEDURE — 92012 INTRM OPH EXAM EST PATIENT: CPT | Performed by: OPHTHALMOLOGY

## 2025-04-24 ASSESSMENT — REFRACTION_MANIFEST
OD_AXIS: 080
OD_CYLINDER: -0.50
OS_SPHERE: +2.50
OD_CYLINDER: -0.50
OD_SPHERE: +2.75
OS_AXIS: 170
OD_VA1: 20/40
OD_SPHERE: PLANO
OS_VA1: 20/40
OD_AXIS: 001
OS_CYLINDER: -1.25
OD_VA1: 20/25

## 2025-04-24 ASSESSMENT — REFRACTION_AUTOREFRACTION
OD_SPHERE: -0.25
OD_CYLINDER: -0.75
OS_AXIS: 175
OS_CYLINDER: -1.00
OD_AXIS: 171
OS_SPHERE: -0.50

## 2025-04-24 ASSESSMENT — REFRACTION_CURRENTRX
OS_CYLINDER: -0.25
OS_ADD: +2.25
OD_ADD: +2.50
OS_AXIS: 111
OD_ADD: +2.25
OD_AXIS: 078
OD_SPHERE: +3.00
OD_OVR_VA: 20/
OS_SPHERE: +2.00
OS_VPRISM_DIRECTION: PROGS
OS_AXIS: 124
OD_CYLINDER: -0.50
OS_VPRISM_DIRECTION: PROGS
OS_OVR_VA: 20/
OD_SPHERE: +3.00
OD_VPRISM_DIRECTION: PROGS
OD_OVR_VA: 20/
OD_AXIS: 081
OS_CYLINDER: +1.00
OD_CYLINDER: -1.50
OS_OVR_VA: 20/
OS_SPHERE: +2.75
OD_VPRISM_DIRECTION: PROGS
OS_ADD: +2.50

## 2025-04-24 ASSESSMENT — VISUAL ACUITY
OS_BCVA: 20/25-
OD_BCVA: 20/25-

## 2025-04-24 ASSESSMENT — KERATOMETRY
OS_K2POWER_DIOPTERS: 47.25
OS_AXISANGLE_DEGREES: 090
OD_AXISANGLE_DEGREES: 090
OD_K1POWER_DIOPTERS: 45.00
OD_K2POWER_DIOPTERS: 47.25
OS_K1POWER_DIOPTERS: 45.00

## 2025-04-24 ASSESSMENT — TONOMETRY
OS_IOP_MMHG: 14
OD_IOP_MMHG: 11

## 2025-04-24 ASSESSMENT — CONFRONTATIONAL VISUAL FIELD TEST (CVF)
OD_FINDINGS: FULL
OS_FINDINGS: FULL

## 2025-05-07 ENCOUNTER — RESULT REVIEW (OUTPATIENT)
Age: 70
End: 2025-05-07

## 2025-05-07 ENCOUNTER — APPOINTMENT (OUTPATIENT)
Dept: DERMATOLOGY | Facility: CLINIC | Age: 70
End: 2025-05-07
Payer: MEDICARE

## 2025-05-07 PROCEDURE — 11102 TANGNTL BX SKIN SINGLE LES: CPT

## 2025-05-07 PROCEDURE — 99213 OFFICE O/P EST LOW 20 MIN: CPT | Mod: 25

## 2025-05-13 ENCOUNTER — TRANSCRIPTION ENCOUNTER (OUTPATIENT)
Age: 70
End: 2025-05-13

## 2025-05-14 ENCOUNTER — TRANSCRIPTION ENCOUNTER (OUTPATIENT)
Age: 70
End: 2025-05-14

## 2025-05-14 DIAGNOSIS — M54.42 LUMBAGO WITH SCIATICA, LEFT SIDE: ICD-10-CM

## 2025-05-14 DIAGNOSIS — G89.29 LUMBAGO WITH SCIATICA, LEFT SIDE: ICD-10-CM

## 2025-05-14 DIAGNOSIS — G89.29 OTHER CHRONIC PAIN: ICD-10-CM

## 2025-05-14 DIAGNOSIS — M54.41 OTHER CHRONIC PAIN: ICD-10-CM

## 2025-06-18 ENCOUNTER — APPOINTMENT (OUTPATIENT)
Dept: RHEUMATOLOGY | Facility: CLINIC | Age: 70
End: 2025-06-18
Payer: MEDICARE

## 2025-06-18 VITALS
HEIGHT: 65 IN | DIASTOLIC BLOOD PRESSURE: 74 MMHG | WEIGHT: 174 LBS | SYSTOLIC BLOOD PRESSURE: 140 MMHG | HEART RATE: 77 BPM | RESPIRATION RATE: 17 BRPM | BODY MASS INDEX: 28.99 KG/M2 | TEMPERATURE: 97.1 F | OXYGEN SATURATION: 95 %

## 2025-06-18 PROBLEM — M54.9 MID BACK PAIN, CHRONIC: Status: ACTIVE | Noted: 2025-06-18

## 2025-06-18 PROCEDURE — G2212 PROLONG OUTPT/OFFICE VIS: CPT

## 2025-06-18 PROCEDURE — 99215 OFFICE O/P EST HI 40 MIN: CPT

## 2025-06-18 PROCEDURE — G2211 COMPLEX E/M VISIT ADD ON: CPT

## 2025-06-21 PROBLEM — R91.1 PULMONARY NODULE: Status: RESOLVED | Noted: 2025-06-21 | Resolved: 2025-06-21

## 2025-06-21 PROBLEM — Z80.0 FAMILY HISTORY OF THROAT CANCER: Status: ACTIVE | Noted: 2025-06-21

## 2025-06-24 ENCOUNTER — RX RENEWAL (OUTPATIENT)
Age: 70
End: 2025-06-24

## 2025-06-24 RX ORDER — ABALOPARATIDE 2000 UG/ML
3120 INJECTION, SOLUTION SUBCUTANEOUS
Qty: 3 | Refills: 0 | Status: ACTIVE | COMMUNITY
Start: 2025-06-24

## 2025-06-24 RX ORDER — ABALOPARATIDE 2000 UG/ML
3120 INJECTION, SOLUTION SUBCUTANEOUS
Qty: 3 | Refills: 0 | Status: ACTIVE | COMMUNITY
Start: 2025-06-21

## 2025-07-07 ENCOUNTER — APPOINTMENT (OUTPATIENT)
Dept: PULMONOLOGY | Facility: CLINIC | Age: 70
End: 2025-07-07
Payer: MEDICARE

## 2025-07-07 VITALS — HEIGHT: 64.5 IN | WEIGHT: 173 LBS | BODY MASS INDEX: 29.18 KG/M2

## 2025-07-07 VITALS
DIASTOLIC BLOOD PRESSURE: 80 MMHG | OXYGEN SATURATION: 98 % | RESPIRATION RATE: 16 BRPM | HEART RATE: 81 BPM | SYSTOLIC BLOOD PRESSURE: 130 MMHG

## 2025-07-07 PROBLEM — J30.9 ALLERGIC RHINITIS, MILD: Status: ACTIVE | Noted: 2025-07-07

## 2025-07-07 PROCEDURE — 94010 BREATHING CAPACITY TEST: CPT

## 2025-07-07 PROCEDURE — 99214 OFFICE O/P EST MOD 30 MIN: CPT | Mod: 25

## 2025-07-07 PROCEDURE — 85018 HEMOGLOBIN: CPT | Mod: QW

## 2025-07-07 PROCEDURE — 94727 GAS DIL/WSHOT DETER LNG VOL: CPT

## 2025-07-07 PROCEDURE — 94729 DIFFUSING CAPACITY: CPT

## 2025-07-07 RX ORDER — FLUTICASONE FUROATE 27.5 UG/1
27.5 SPRAY, METERED NASAL DAILY
Qty: 1 | Refills: 2 | Status: ACTIVE | COMMUNITY
Start: 2025-07-07 | End: 1900-01-01

## 2025-07-07 RX ORDER — BUDESONIDE AND FORMOTEROL FUMARATE DIHYDRATE 80; 4.5 UG/1; UG/1
80-4.5 AEROSOL RESPIRATORY (INHALATION) TWICE DAILY
Qty: 1 | Refills: 5 | Status: ACTIVE | COMMUNITY
Start: 2025-07-07 | End: 1900-01-01

## 2025-07-08 ENCOUNTER — APPOINTMENT (OUTPATIENT)
Dept: ORTHOPEDIC SURGERY | Facility: CLINIC | Age: 70
End: 2025-07-08
Payer: MEDICARE

## 2025-07-08 VITALS
BODY MASS INDEX: 29.18 KG/M2 | HEART RATE: 82 BPM | WEIGHT: 173 LBS | SYSTOLIC BLOOD PRESSURE: 130 MMHG | HEIGHT: 64.5 IN | DIASTOLIC BLOOD PRESSURE: 74 MMHG

## 2025-07-08 PROCEDURE — G2211 COMPLEX E/M VISIT ADD ON: CPT

## 2025-07-08 PROCEDURE — 73562 X-RAY EXAM OF KNEE 3: CPT | Mod: LT

## 2025-07-08 PROCEDURE — 99214 OFFICE O/P EST MOD 30 MIN: CPT

## 2025-07-08 RX ORDER — AMOXICILLIN 500 MG/1
500 CAPSULE ORAL
Qty: 4 | Refills: 4 | Status: ACTIVE | COMMUNITY
Start: 2025-07-08 | End: 1900-01-01

## 2025-07-16 LAB
ALBUMIN SERPL ELPH-MCNC: 4.4 G/DL
ALP BLD-CCNC: 114 U/L
ALT SERPL-CCNC: 25 U/L
ANION GAP SERPL CALC-SCNC: 16 MMOL/L
AST SERPL-CCNC: 25 U/L
BASOPHILS # BLD AUTO: 0.11 K/UL
BASOPHILS NFR BLD AUTO: 0.9 %
BILIRUB SERPL-MCNC: 0.5 MG/DL
BUN SERPL-MCNC: 14 MG/DL
CALCIUM SERPL-MCNC: 9.4 MG/DL
CHLORIDE SERPL-SCNC: 102 MMOL/L
CHOLEST SERPL-MCNC: 157 MG/DL
CO2 SERPL-SCNC: 22 MMOL/L
CREAT SERPL-MCNC: 0.63 MG/DL
CREAT SPEC-SCNC: 77 MG/DL
EGFRCR SERPLBLD CKD-EPI 2021: 96 ML/MIN/1.73M2
EOSINOPHIL # BLD AUTO: 0.45 K/UL
EOSINOPHIL NFR BLD AUTO: 3.9 %
ESTIMATED AVERAGE GLUCOSE: 146 MG/DL
GLUCOSE SERPL-MCNC: 118 MG/DL
HBA1C MFR BLD HPLC: 6.7 %
HCT VFR BLD CALC: 41.4 %
HDLC SERPL-MCNC: 59 MG/DL
HGB BLD-MCNC: 13.3 G/DL
IMM GRANULOCYTES NFR BLD AUTO: 0.7 %
LDLC SERPL-MCNC: 80 MG/DL
LYMPHOCYTES # BLD AUTO: 2.25 K/UL
LYMPHOCYTES NFR BLD AUTO: 19.4 %
MAN DIFF?: NORMAL
MCHC RBC-ENTMCNC: 27.8 PG
MCHC RBC-ENTMCNC: 32.1 G/DL
MCV RBC AUTO: 86.6 FL
MICROALBUMIN 24H UR DL<=1MG/L-MCNC: <1.2 MG/DL
MICROALBUMIN/CREAT 24H UR-RTO: NORMAL MG/G
MONOCYTES # BLD AUTO: 1.03 K/UL
MONOCYTES NFR BLD AUTO: 8.9 %
NEUTROPHILS # BLD AUTO: 7.67 K/UL
NEUTROPHILS NFR BLD AUTO: 66.2 %
NONHDLC SERPL-MCNC: 99 MG/DL
PLATELET # BLD AUTO: 338 K/UL
POTASSIUM SERPL-SCNC: 4.4 MMOL/L
PROT SERPL-MCNC: 7.1 G/DL
RBC # BLD: 4.78 M/UL
RBC # FLD: 14.4 %
SODIUM SERPL-SCNC: 139 MMOL/L
T4 FREE SERPL-MCNC: 1.4 NG/DL
TRIGL SERPL-MCNC: 102 MG/DL
TSH SERPL-ACNC: 0.91 UIU/ML
WBC # FLD AUTO: 11.59 K/UL

## 2025-07-17 ENCOUNTER — APPOINTMENT (OUTPATIENT)
Dept: CARDIOLOGY | Facility: CLINIC | Age: 70
End: 2025-07-17
Payer: MEDICARE

## 2025-07-17 ENCOUNTER — APPOINTMENT (OUTPATIENT)
Dept: ENDOCRINOLOGY | Facility: CLINIC | Age: 70
End: 2025-07-17
Payer: MEDICARE

## 2025-07-17 VITALS
SYSTOLIC BLOOD PRESSURE: 131 MMHG | WEIGHT: 176 LBS | HEIGHT: 64.5 IN | DIASTOLIC BLOOD PRESSURE: 70 MMHG | BODY MASS INDEX: 29.68 KG/M2 | OXYGEN SATURATION: 96 % | HEART RATE: 78 BPM

## 2025-07-17 VITALS
WEIGHT: 178 LBS | HEART RATE: 75 BPM | DIASTOLIC BLOOD PRESSURE: 82 MMHG | BODY MASS INDEX: 30.02 KG/M2 | HEIGHT: 64.5 IN | OXYGEN SATURATION: 97 % | SYSTOLIC BLOOD PRESSURE: 146 MMHG

## 2025-07-17 PROCEDURE — G2211 COMPLEX E/M VISIT ADD ON: CPT

## 2025-07-17 PROCEDURE — 99214 OFFICE O/P EST MOD 30 MIN: CPT

## 2025-07-17 PROCEDURE — 93000 ELECTROCARDIOGRAM COMPLETE: CPT

## 2025-07-17 RX ORDER — METFORMIN HYDROCHLORIDE 500 MG/1
500 TABLET, COATED ORAL
Qty: 180 | Refills: 1 | Status: ACTIVE | COMMUNITY
Start: 2025-07-17 | End: 1900-01-01

## 2025-07-17 RX ORDER — METFORMIN HYDROCHLORIDE 750 MG/1
TABLET ORAL
Refills: 0 | Status: DISCONTINUED | COMMUNITY

## 2025-08-14 ENCOUNTER — APPOINTMENT (OUTPATIENT)
Dept: ORTHOPEDIC SURGERY | Facility: CLINIC | Age: 70
End: 2025-08-14
Payer: MEDICARE

## 2025-08-14 VITALS
HEART RATE: 72 BPM | HEIGHT: 64.5 IN | BODY MASS INDEX: 29.35 KG/M2 | WEIGHT: 174 LBS | DIASTOLIC BLOOD PRESSURE: 78 MMHG | SYSTOLIC BLOOD PRESSURE: 158 MMHG

## 2025-08-14 DIAGNOSIS — S16.1XXA STRAIN OF MUSCLE, FASCIA AND TENDON AT NECK LEVEL, INITIAL ENCOUNTER: ICD-10-CM

## 2025-08-14 DIAGNOSIS — M47.812 SPONDYLOSIS W/OUT MYELOPATHY OR RADICULOPATHY, CERVICAL REGION: ICD-10-CM

## 2025-08-14 PROCEDURE — 99213 OFFICE O/P EST LOW 20 MIN: CPT

## 2025-08-14 PROCEDURE — 72050 X-RAY EXAM NECK SPINE 4/5VWS: CPT

## 2025-08-14 RX ORDER — METHYLPREDNISOLONE 4 MG/1
4 TABLET ORAL
Qty: 1 | Refills: 0 | Status: ACTIVE | COMMUNITY
Start: 2025-08-14 | End: 1900-01-01

## 2025-08-21 ENCOUNTER — APPOINTMENT (OUTPATIENT)
Dept: ORTHOPEDIC SURGERY | Facility: CLINIC | Age: 70
End: 2025-08-21
Payer: MEDICARE

## 2025-08-21 VITALS — BODY MASS INDEX: 29.51 KG/M2 | WEIGHT: 175 LBS | HEIGHT: 64.5 IN

## 2025-08-21 DIAGNOSIS — S12.100A UNSPECIFIED DISPLACED FRACTURE OF SECOND CERVICAL VERTEBRA, INITIAL ENCOUNTER FOR CLOSED FRACTURE: ICD-10-CM

## 2025-08-21 PROCEDURE — 99214 OFFICE O/P EST MOD 30 MIN: CPT

## 2025-08-29 ENCOUNTER — OUTPATIENT (OUTPATIENT)
Dept: OUTPATIENT SERVICES | Facility: HOSPITAL | Age: 70
LOS: 1 days | End: 2025-08-29
Payer: MEDICARE

## 2025-08-29 ENCOUNTER — RESULT REVIEW (OUTPATIENT)
Age: 70
End: 2025-08-29

## 2025-08-29 ENCOUNTER — NON-APPOINTMENT (OUTPATIENT)
Age: 70
End: 2025-08-29

## 2025-08-29 ENCOUNTER — APPOINTMENT (OUTPATIENT)
Dept: CT IMAGING | Facility: CLINIC | Age: 70
End: 2025-08-29
Payer: MEDICARE

## 2025-08-29 DIAGNOSIS — S12.100A UNSPECIFIED DISPLACED FRACTURE OF SECOND CERVICAL VERTEBRA, INITIAL ENCOUNTER FOR CLOSED FRACTURE: ICD-10-CM

## 2025-08-29 DIAGNOSIS — Z98.890 OTHER SPECIFIED POSTPROCEDURAL STATES: Chronic | ICD-10-CM

## 2025-08-29 DIAGNOSIS — Z00.8 ENCOUNTER FOR OTHER GENERAL EXAMINATION: ICD-10-CM

## 2025-08-29 DIAGNOSIS — Z98.51 TUBAL LIGATION STATUS: Chronic | ICD-10-CM

## 2025-08-29 PROCEDURE — 76376 3D RENDER W/INTRP POSTPROCES: CPT | Mod: 26

## 2025-08-29 PROCEDURE — 72125 CT NECK SPINE W/O DYE: CPT

## 2025-08-29 PROCEDURE — 76376 3D RENDER W/INTRP POSTPROCES: CPT

## 2025-08-29 PROCEDURE — 72125 CT NECK SPINE W/O DYE: CPT | Mod: 26

## 2025-09-02 ENCOUNTER — APPOINTMENT (OUTPATIENT)
Dept: ORTHOPEDIC SURGERY | Facility: CLINIC | Age: 70
End: 2025-09-02
Payer: MEDICARE

## 2025-09-02 VITALS — BODY MASS INDEX: 29.51 KG/M2 | HEIGHT: 64.5 IN | WEIGHT: 175 LBS

## 2025-09-02 DIAGNOSIS — S12.100A UNSPECIFIED DISPLACED FRACTURE OF SECOND CERVICAL VERTEBRA, INITIAL ENCOUNTER FOR CLOSED FRACTURE: ICD-10-CM

## 2025-09-02 PROCEDURE — 72040 X-RAY EXAM NECK SPINE 2-3 VW: CPT

## 2025-09-02 PROCEDURE — 99214 OFFICE O/P EST MOD 30 MIN: CPT

## 2025-09-02 RX ORDER — TRAMADOL HYDROCHLORIDE 50 MG/1
50 TABLET, COATED ORAL 3 TIMES DAILY
Qty: 21 | Refills: 0 | Status: ACTIVE | COMMUNITY
Start: 2025-09-02 | End: 1900-01-01

## 2025-09-11 ENCOUNTER — APPOINTMENT (OUTPATIENT)
Dept: CT IMAGING | Facility: CLINIC | Age: 70
End: 2025-09-11

## 2025-09-11 PROCEDURE — 70498 CT ANGIOGRAPHY NECK: CPT | Mod: 26

## (undated) DEVICE — ELCTR GROUNDING PAD ADULT COVIDIEN

## (undated) DEVICE — GLV 8 PROTEXIS ORTHO (BROWN)

## (undated) DEVICE — SLING ARM CHIEFTAIN MESH MEDIUM

## (undated) DEVICE — DRAPE IOBAN 33" X 23"

## (undated) DEVICE — MAKO VIZADISC KNEE TRACKING KIT

## (undated) DEVICE — MAKO BLADE NARROW

## (undated) DEVICE — DRSG DERMABOND PRINEO 60CM

## (undated) DEVICE — MAKO DRAPE KIT

## (undated) DEVICE — DRAPE 1/2 SHEET 40X57"

## (undated) DEVICE — NDL HYPO SAFE 20G X 1.5" (YELLOW)

## (undated) DEVICE — ZIMMER MIXING BOWL

## (undated) DEVICE — SOL IRR BAG NS 0.9% 3000ML

## (undated) DEVICE — DRAPE STICKY U BLUE 60 X 84"

## (undated) DEVICE — ELCTR STRYKER NEPTUNE SMOKE EVACUATION PENCIL (GREEN)

## (undated) DEVICE — DRSG WEBRIL 6"

## (undated) DEVICE — ZIMMER PULSAVAC PLUS FAN KIT

## (undated) DEVICE — MAKO CHECKPOINT KIT FEMORAL / TIBIAL

## (undated) DEVICE — PACK TOTAL KNEE

## (undated) DEVICE — SUT STRATAFIX SPIRAL PDO 1 36CM CTX

## (undated) DEVICE — SLING ARM CHIEFTAIN MESH LARGE

## (undated) DEVICE — URETERAL CATH RED RUBBER 16FR (ORANGE)

## (undated) DEVICE — WARMING BLANKET UPPER ADULT

## (undated) DEVICE — HOOD T7 NON-PEELAWAY

## (undated) DEVICE — SYR LUER LOK 50CC

## (undated) DEVICE — SUT VICRYL 0 18" CT-1 UNDYED (POP-OFF)

## (undated) DEVICE — DRSG ACE BANDAGE 6"

## (undated) DEVICE — MAKO BLADE STANDARD

## (undated) DEVICE — DRAPE 3/4 SHEET 52X76"

## (undated) DEVICE — WOUND IRR SURGIPHOR

## (undated) DEVICE — HOOD FLYTE STRYKER SURGICOOL W PEELAWAY

## (undated) DEVICE — SOL IRR POUR H2O 1000ML

## (undated) DEVICE — SOL IRR POUR NS 0.9% 1000ML

## (undated) DEVICE — DRSG MEPILEX 10 X 25CM (4 X 10") POST OP AG SILVER

## (undated) DEVICE — SUT VICRYL 2-0 27" CT-2 UNDYED

## (undated) DEVICE — GLV 8 PROTEXIS (BLUE)

## (undated) DEVICE — SUT STRATAFIX SPIRAL MONOCRYL PLUS 3-0 30CM PS-2 UNDYED

## (undated) DEVICE — SLING ARM CHIEFTAIN MESH SMALL

## (undated) DEVICE — ELCTR AQUAMANTYS BIPOLAR SEALER 6.0

## (undated) DEVICE — DRAPE XL SHEET 77X98"

## (undated) DEVICE — VENODYNE/SCD SLEEVE CALF MEDIUM